# Patient Record
Sex: MALE | Race: WHITE | Employment: OTHER | URBAN - METROPOLITAN AREA
[De-identification: names, ages, dates, MRNs, and addresses within clinical notes are randomized per-mention and may not be internally consistent; named-entity substitution may affect disease eponyms.]

---

## 2017-01-02 DIAGNOSIS — D50.8 OTHER IRON DEFICIENCY ANEMIA: ICD-10-CM

## 2017-01-02 RX ORDER — FERROUS SULFATE 325(65) MG
TABLET ORAL
Qty: 30 TABLET | Refills: 3 | Status: SHIPPED | OUTPATIENT
Start: 2017-01-02

## 2017-01-10 DIAGNOSIS — F90.1 ATTENTION-DEFICIT HYPERACTIVITY DISORDER, PREDOMINANTLY HYPERACTIVE TYPE: ICD-10-CM

## 2017-01-10 DIAGNOSIS — G58.9 MONONEUROPATHY: ICD-10-CM

## 2017-01-11 ENCOUNTER — CARE COORDINATION (OUTPATIENT)
Dept: FAMILY MEDICINE CLINIC | Age: 47
End: 2017-01-11

## 2017-01-11 RX ORDER — LAMOTRIGINE 150 MG/1
TABLET ORAL
Qty: 60 TABLET | Refills: 5 | Status: SHIPPED | OUTPATIENT
Start: 2017-01-11 | End: 2017-08-15 | Stop reason: SDUPTHER

## 2017-01-11 RX ORDER — ATOMOXETINE HCL 60 MG
CAPSULE ORAL
Qty: 30 CAPSULE | Refills: 3 | Status: SHIPPED | OUTPATIENT
Start: 2017-01-11 | End: 2017-05-27

## 2017-01-13 ENCOUNTER — CARE COORDINATION (OUTPATIENT)
Dept: FAMILY MEDICINE CLINIC | Age: 47
End: 2017-01-13

## 2017-01-15 DIAGNOSIS — G47.01 INSOMNIA DUE TO MEDICAL CONDITION: ICD-10-CM

## 2017-01-15 DIAGNOSIS — K27.9 PUD (PEPTIC ULCER DISEASE): ICD-10-CM

## 2017-01-16 RX ORDER — DULOXETIN HYDROCHLORIDE 30 MG/1
CAPSULE, DELAYED RELEASE ORAL
Qty: 30 CAPSULE | Refills: 5 | Status: SHIPPED | OUTPATIENT
Start: 2017-01-16 | End: 2017-01-19 | Stop reason: ALTCHOICE

## 2017-01-16 RX ORDER — TRAZODONE HYDROCHLORIDE 50 MG/1
TABLET ORAL
Qty: 30 TABLET | Refills: 5 | Status: SHIPPED | OUTPATIENT
Start: 2017-01-16 | End: 2017-03-30 | Stop reason: SDUPTHER

## 2017-01-16 RX ORDER — PANTOPRAZOLE SODIUM 40 MG/1
TABLET, DELAYED RELEASE ORAL
Qty: 30 TABLET | Refills: 5 | Status: SHIPPED | OUTPATIENT
Start: 2017-01-16 | End: 2017-11-06 | Stop reason: SDUPTHER

## 2017-01-19 ENCOUNTER — CARE COORDINATION (OUTPATIENT)
Dept: FAMILY MEDICINE CLINIC | Age: 47
End: 2017-01-19

## 2017-01-19 ENCOUNTER — TELEPHONE (OUTPATIENT)
Dept: FAMILY MEDICINE CLINIC | Age: 47
End: 2017-01-19

## 2017-01-19 DIAGNOSIS — Z79.01 ANTICOAGULANT LONG-TERM USE: ICD-10-CM

## 2017-01-19 PROBLEM — J44.9 CHRONIC OBSTRUCTIVE PULMONARY DISEASE (HCC): Status: ACTIVE | Noted: 2017-01-19

## 2017-01-19 RX ORDER — VARENICLINE TARTRATE 25 MG
KIT ORAL
Qty: 53 TABLET | Refills: 1 | Status: SHIPPED | OUTPATIENT
Start: 2017-01-19 | End: 2017-05-27 | Stop reason: ALTCHOICE

## 2017-02-02 RX ORDER — HYDROXYZINE PAMOATE 25 MG/1
CAPSULE ORAL
Qty: 60 CAPSULE | Refills: 2 | Status: SHIPPED | OUTPATIENT
Start: 2017-02-02 | End: 2019-01-11 | Stop reason: SDUPTHER

## 2017-02-04 ENCOUNTER — OFFICE VISIT (OUTPATIENT)
Dept: FAMILY MEDICINE CLINIC | Age: 47
End: 2017-02-04

## 2017-02-04 VITALS
HEIGHT: 66 IN | SYSTOLIC BLOOD PRESSURE: 122 MMHG | DIASTOLIC BLOOD PRESSURE: 84 MMHG | HEART RATE: 78 BPM | WEIGHT: 172 LBS | BODY MASS INDEX: 27.64 KG/M2 | TEMPERATURE: 98.5 F | RESPIRATION RATE: 16 BRPM

## 2017-02-04 DIAGNOSIS — J40 SINOBRONCHITIS: Primary | ICD-10-CM

## 2017-02-04 DIAGNOSIS — I63.411 CEREBROVASCULAR ACCIDENT (CVA) DUE TO EMBOLISM OF RIGHT MIDDLE CEREBRAL ARTERY (HCC): ICD-10-CM

## 2017-02-04 DIAGNOSIS — Z23 NEED FOR PNEUMOCOCCAL VACCINATION: ICD-10-CM

## 2017-02-04 DIAGNOSIS — I48.0 PAROXYSMAL ATRIAL FIBRILLATION (HCC): ICD-10-CM

## 2017-02-04 DIAGNOSIS — J44.9 COPD, MILD (HCC): ICD-10-CM

## 2017-02-04 DIAGNOSIS — J32.9 SINOBRONCHITIS: Primary | ICD-10-CM

## 2017-02-04 PROCEDURE — G8926 SPIRO NO PERF OR DOC: HCPCS | Performed by: FAMILY MEDICINE

## 2017-02-04 PROCEDURE — G8484 FLU IMMUNIZE NO ADMIN: HCPCS | Performed by: FAMILY MEDICINE

## 2017-02-04 PROCEDURE — G8598 ASA/ANTIPLAT THER USED: HCPCS | Performed by: FAMILY MEDICINE

## 2017-02-04 PROCEDURE — 90732 PPSV23 VACC 2 YRS+ SUBQ/IM: CPT | Performed by: FAMILY MEDICINE

## 2017-02-04 PROCEDURE — G8419 CALC BMI OUT NRM PARAM NOF/U: HCPCS | Performed by: FAMILY MEDICINE

## 2017-02-04 PROCEDURE — G0009 ADMIN PNEUMOCOCCAL VACCINE: HCPCS | Performed by: FAMILY MEDICINE

## 2017-02-04 PROCEDURE — G8427 DOCREV CUR MEDS BY ELIG CLIN: HCPCS | Performed by: FAMILY MEDICINE

## 2017-02-04 PROCEDURE — 1036F TOBACCO NON-USER: CPT | Performed by: FAMILY MEDICINE

## 2017-02-04 PROCEDURE — 3023F SPIROM DOC REV: CPT | Performed by: FAMILY MEDICINE

## 2017-02-04 PROCEDURE — 99213 OFFICE O/P EST LOW 20 MIN: CPT | Performed by: FAMILY MEDICINE

## 2017-02-04 RX ORDER — AMOXICILLIN 500 MG/1
CAPSULE ORAL
Qty: 40 CAPSULE | Refills: 0 | Status: SHIPPED | OUTPATIENT
Start: 2017-02-04 | End: 2017-05-27 | Stop reason: ALTCHOICE

## 2017-02-04 RX ORDER — ALBUTEROL SULFATE 90 UG/1
AEROSOL, METERED RESPIRATORY (INHALATION)
Qty: 9 G | Refills: 10 | Status: SHIPPED | OUTPATIENT
Start: 2017-02-04

## 2017-02-07 ENCOUNTER — ANTI-COAG VISIT (OUTPATIENT)
Dept: CARDIOLOGY | Age: 47
End: 2017-02-07

## 2017-02-12 ASSESSMENT — ENCOUNTER SYMPTOMS
COUGH: 1
SORE THROAT: 1
ABDOMINAL PAIN: 0
RHINORRHEA: 1
SHORTNESS OF BREATH: 0
BLOOD IN STOOL: 0
SINUS PRESSURE: 1
WHEEZING: 0

## 2017-02-20 ENCOUNTER — CARE COORDINATION (OUTPATIENT)
Dept: FAMILY MEDICINE CLINIC | Age: 47
End: 2017-02-20

## 2017-02-20 LAB
INR BLD: 2.54
PROTIME: 26.4 SECONDS

## 2017-02-21 ENCOUNTER — ANTI-COAG VISIT (OUTPATIENT)
Dept: FAMILY MEDICINE CLINIC | Age: 47
End: 2017-02-21

## 2017-02-21 DIAGNOSIS — D69.9 ANTICOAGULANT DISORDER (HCC): Primary | ICD-10-CM

## 2017-02-27 DIAGNOSIS — N40.1 BPH ASSOCIATED WITH NOCTURIA: ICD-10-CM

## 2017-02-27 DIAGNOSIS — R35.1 BPH ASSOCIATED WITH NOCTURIA: ICD-10-CM

## 2017-02-27 DIAGNOSIS — N41.0 ACUTE PROSTATITIS: ICD-10-CM

## 2017-02-27 RX ORDER — TAMSULOSIN HYDROCHLORIDE 0.4 MG/1
CAPSULE ORAL
Qty: 180 CAPSULE | Refills: 3 | Status: SHIPPED | OUTPATIENT
Start: 2017-02-27 | End: 2018-03-02 | Stop reason: SDUPTHER

## 2017-03-30 DIAGNOSIS — G47.01 INSOMNIA DUE TO MEDICAL CONDITION: ICD-10-CM

## 2017-03-30 RX ORDER — TRAZODONE HYDROCHLORIDE 50 MG/1
TABLET ORAL
Qty: 30 TABLET | Refills: 5 | Status: SHIPPED | OUTPATIENT
Start: 2017-03-30 | End: 2018-02-02 | Stop reason: SDUPTHER

## 2017-03-31 DIAGNOSIS — G47.01 INSOMNIA DUE TO MEDICAL CONDITION: ICD-10-CM

## 2017-03-31 RX ORDER — TRAZODONE HYDROCHLORIDE 100 MG/1
TABLET ORAL
Qty: 30 TABLET | Refills: 3 | Status: SHIPPED | OUTPATIENT
Start: 2017-03-31 | End: 2017-10-02 | Stop reason: SDUPTHER

## 2017-05-12 ENCOUNTER — CARE COORDINATION (OUTPATIENT)
Dept: CARE COORDINATION | Age: 47
End: 2017-05-12

## 2017-05-12 DIAGNOSIS — D69.9 ANTICOAGULANT DISORDER (HCC): Primary | ICD-10-CM

## 2017-05-12 RX ORDER — WARFARIN SODIUM 5 MG/1
TABLET ORAL
Qty: 60 TABLET | Refills: 5 | Status: SHIPPED | OUTPATIENT
Start: 2017-05-12 | End: 2018-01-05 | Stop reason: SDUPTHER

## 2017-05-12 RX ORDER — WARFARIN SODIUM 5 MG/1
TABLET ORAL
Qty: 30 TABLET | Refills: 0 | Status: SHIPPED | OUTPATIENT
Start: 2017-05-12 | End: 2017-05-12 | Stop reason: SDUPTHER

## 2017-05-27 ENCOUNTER — OFFICE VISIT (OUTPATIENT)
Dept: FAMILY MEDICINE CLINIC | Age: 47
End: 2017-05-27

## 2017-05-27 VITALS
TEMPERATURE: 97.2 F | HEIGHT: 66 IN | BODY MASS INDEX: 29.09 KG/M2 | SYSTOLIC BLOOD PRESSURE: 118 MMHG | RESPIRATION RATE: 16 BRPM | WEIGHT: 181 LBS | DIASTOLIC BLOOD PRESSURE: 76 MMHG | HEART RATE: 78 BPM

## 2017-05-27 DIAGNOSIS — I48.92 ATRIAL FLUTTER, UNSPECIFIED TYPE (HCC): ICD-10-CM

## 2017-05-27 DIAGNOSIS — I69.359 HEMIPLEGIA, DOMINANT SIDE S/P CVA (CEREBROVASCULAR ACCIDENT) (HCC): ICD-10-CM

## 2017-05-27 DIAGNOSIS — D69.9 ANTICOAGULANT DISORDER (HCC): ICD-10-CM

## 2017-05-27 DIAGNOSIS — E29.1 HYPOGONADISM IN MALE: ICD-10-CM

## 2017-05-27 DIAGNOSIS — Z12.5 PROSTATE CANCER SCREENING: ICD-10-CM

## 2017-05-27 DIAGNOSIS — F43.23 ADJUSTMENT REACTION WITH ANXIETY AND DEPRESSION: Primary | ICD-10-CM

## 2017-05-27 LAB
ALBUMIN SERPL-MCNC: 4.6 G/DL (ref 3.9–4.9)
ALP BLD-CCNC: 53 U/L (ref 35–104)
ALT SERPL-CCNC: 20 U/L (ref 0–41)
ANION GAP SERPL CALCULATED.3IONS-SCNC: 15 MEQ/L (ref 7–13)
AST SERPL-CCNC: 23 U/L (ref 0–40)
BASOPHILS ABSOLUTE: 0 K/UL (ref 0–0.2)
BASOPHILS RELATIVE PERCENT: 0.6 %
BILIRUB SERPL-MCNC: 0.6 MG/DL (ref 0–1.2)
BUN BLDV-MCNC: 17 MG/DL (ref 6–20)
CALCIUM SERPL-MCNC: 9.2 MG/DL (ref 8.6–10.2)
CHLORIDE BLD-SCNC: 100 MEQ/L (ref 98–107)
CO2: 25 MEQ/L (ref 22–29)
CREAT SERPL-MCNC: 1.02 MG/DL (ref 0.7–1.2)
EOSINOPHILS ABSOLUTE: 0.7 K/UL (ref 0–0.7)
EOSINOPHILS RELATIVE PERCENT: 8.4 %
GFR AFRICAN AMERICAN: >60
GFR NON-AFRICAN AMERICAN: >60
GLOBULIN: 2.7 G/DL (ref 2.3–3.5)
GLUCOSE BLD-MCNC: 109 MG/DL (ref 74–109)
HCT VFR BLD CALC: 47.4 % (ref 42–52)
HEMOGLOBIN: 16 G/DL (ref 14–18)
INR BLD: 1.7
LYMPHOCYTES ABSOLUTE: 1.5 K/UL (ref 1–4.8)
LYMPHOCYTES RELATIVE PERCENT: 17.6 %
MCH RBC QN AUTO: 29.9 PG (ref 27–31.3)
MCHC RBC AUTO-ENTMCNC: 33.8 % (ref 33–37)
MCV RBC AUTO: 88.3 FL (ref 80–100)
MONOCYTES ABSOLUTE: 0.7 K/UL (ref 0.2–0.8)
MONOCYTES RELATIVE PERCENT: 8.3 %
NEUTROPHILS ABSOLUTE: 5.5 K/UL (ref 1.4–6.5)
NEUTROPHILS RELATIVE PERCENT: 65.1 %
PDW BLD-RTO: 13.8 % (ref 11.5–14.5)
PLATELET # BLD: 175 K/UL (ref 130–400)
POTASSIUM SERPL-SCNC: 4.1 MEQ/L (ref 3.5–5.1)
PROSTATE SPECIFIC ANTIGEN: 1.6 NG/ML
PROTHROMBIN TIME: 18.3 SEC (ref 8.1–13.7)
RBC # BLD: 5.37 M/UL (ref 4.7–6.1)
SLIDE REVIEW: NORMAL
SODIUM BLD-SCNC: 140 MEQ/L (ref 132–144)
TOTAL PROTEIN: 7.3 G/DL (ref 6.4–8.1)
WBC # BLD: 8.4 K/UL (ref 4.8–10.8)

## 2017-05-27 PROCEDURE — G8419 CALC BMI OUT NRM PARAM NOF/U: HCPCS | Performed by: FAMILY MEDICINE

## 2017-05-27 PROCEDURE — 99213 OFFICE O/P EST LOW 20 MIN: CPT | Performed by: FAMILY MEDICINE

## 2017-05-27 PROCEDURE — 1036F TOBACCO NON-USER: CPT | Performed by: FAMILY MEDICINE

## 2017-05-27 PROCEDURE — G8598 ASA/ANTIPLAT THER USED: HCPCS | Performed by: FAMILY MEDICINE

## 2017-05-27 PROCEDURE — G8427 DOCREV CUR MEDS BY ELIG CLIN: HCPCS | Performed by: FAMILY MEDICINE

## 2017-05-27 RX ORDER — DULOXETIN HYDROCHLORIDE 30 MG/1
30 CAPSULE, DELAYED RELEASE ORAL DAILY
Qty: 30 CAPSULE | Refills: 3 | Status: SHIPPED | OUTPATIENT
Start: 2017-05-27

## 2017-05-27 RX ORDER — TESTOSTERONE 16.2 MG/G
GEL TRANSDERMAL
Qty: 75 G | Refills: 3 | Status: SHIPPED | OUTPATIENT
Start: 2017-05-27

## 2017-05-28 RX ORDER — SILDENAFIL 100 MG/1
100 TABLET, FILM COATED ORAL PRN
Qty: 4 TABLET | Refills: 0 | COMMUNITY
Start: 2017-05-28

## 2017-05-30 ENCOUNTER — ANTI-COAG VISIT (OUTPATIENT)
Dept: FAMILY MEDICINE CLINIC | Age: 47
End: 2017-05-30

## 2017-05-31 ENCOUNTER — TELEPHONE (OUTPATIENT)
Dept: FAMILY MEDICINE CLINIC | Age: 47
End: 2017-05-31

## 2017-05-31 ENCOUNTER — ANTI-COAG VISIT (OUTPATIENT)
Dept: FAMILY MEDICINE CLINIC | Age: 47
End: 2017-05-31

## 2017-05-31 ASSESSMENT — ENCOUNTER SYMPTOMS
ABDOMINAL PAIN: 0
CHEST TIGHTNESS: 0
SHORTNESS OF BREATH: 0

## 2017-07-16 RX ORDER — DULOXETIN HYDROCHLORIDE 30 MG/1
CAPSULE, DELAYED RELEASE ORAL
Qty: 30 CAPSULE | OUTPATIENT
Start: 2017-07-16

## 2017-08-15 DIAGNOSIS — G58.9 MONONEUROPATHY: ICD-10-CM

## 2017-08-15 RX ORDER — LAMOTRIGINE 150 MG/1
TABLET ORAL
Qty: 60 TABLET | Refills: 5 | Status: SHIPPED | OUTPATIENT
Start: 2017-08-15 | End: 2018-03-02 | Stop reason: SDUPTHER

## 2017-08-19 RX ORDER — FERROUS SULFATE 325(65) MG
TABLET ORAL
Qty: 30 TABLET | Refills: 5 | Status: SHIPPED | OUTPATIENT
Start: 2017-08-19

## 2017-10-02 DIAGNOSIS — G47.01 INSOMNIA DUE TO MEDICAL CONDITION: ICD-10-CM

## 2017-10-02 RX ORDER — TRAZODONE HYDROCHLORIDE 100 MG/1
TABLET ORAL
Qty: 30 TABLET | Refills: 3 | Status: SHIPPED | OUTPATIENT
Start: 2017-10-02 | End: 2018-03-02 | Stop reason: SDUPTHER

## 2017-11-06 DIAGNOSIS — K27.9 PUD (PEPTIC ULCER DISEASE): ICD-10-CM

## 2017-11-06 RX ORDER — PANTOPRAZOLE SODIUM 40 MG/1
TABLET, DELAYED RELEASE ORAL
Qty: 30 TABLET | Refills: 5 | Status: SHIPPED | OUTPATIENT
Start: 2017-11-06

## 2018-01-05 DIAGNOSIS — D69.9 ANTICOAGULANT DISORDER (HCC): Primary | ICD-10-CM

## 2018-01-05 NOTE — TELEPHONE ENCOUNTER
Pharmacy requests refill on medication. Please approve or deny this request.  Last seen by you on 5/27/2017. No future appointments.

## 2018-01-06 PROBLEM — I81 PVT (PORTAL VEIN THROMBOSIS): Status: ACTIVE | Noted: 2018-01-06

## 2018-01-06 RX ORDER — WARFARIN SODIUM 5 MG/1
TABLET ORAL
Qty: 30 TABLET | Refills: 5 | Status: SHIPPED | OUTPATIENT
Start: 2018-01-06 | End: 2018-04-27 | Stop reason: SDUPTHER

## 2018-02-01 ENCOUNTER — OFFICE VISIT (OUTPATIENT)
Dept: FAMILY MEDICINE CLINIC | Age: 48
End: 2018-02-01
Payer: MEDICARE

## 2018-02-01 VITALS
HEIGHT: 66 IN | DIASTOLIC BLOOD PRESSURE: 88 MMHG | BODY MASS INDEX: 29.09 KG/M2 | WEIGHT: 181 LBS | TEMPERATURE: 99.1 F | RESPIRATION RATE: 14 BRPM | HEART RATE: 66 BPM | SYSTOLIC BLOOD PRESSURE: 118 MMHG

## 2018-02-01 DIAGNOSIS — Z12.11 SCREENING FOR MALIGNANT NEOPLASM OF COLON: ICD-10-CM

## 2018-02-01 DIAGNOSIS — Z86.79 HISTORY OF SICK SINUS SYNDROME: ICD-10-CM

## 2018-02-01 DIAGNOSIS — N52.01 ERECTILE DYSFUNCTION DUE TO ARTERIAL INSUFFICIENCY: ICD-10-CM

## 2018-02-01 DIAGNOSIS — F31.73 BIPOLAR DISORDER, IN PARTIAL REMISSION, MOST RECENT EPISODE MANIC (HCC): Primary | ICD-10-CM

## 2018-02-01 DIAGNOSIS — F33.1 MODERATE EPISODE OF RECURRENT MAJOR DEPRESSIVE DISORDER (HCC): ICD-10-CM

## 2018-02-01 PROCEDURE — G8427 DOCREV CUR MEDS BY ELIG CLIN: HCPCS | Performed by: FAMILY MEDICINE

## 2018-02-01 PROCEDURE — 99213 OFFICE O/P EST LOW 20 MIN: CPT | Performed by: FAMILY MEDICINE

## 2018-02-01 PROCEDURE — G8598 ASA/ANTIPLAT THER USED: HCPCS | Performed by: FAMILY MEDICINE

## 2018-02-01 PROCEDURE — G8484 FLU IMMUNIZE NO ADMIN: HCPCS | Performed by: FAMILY MEDICINE

## 2018-02-01 PROCEDURE — G8419 CALC BMI OUT NRM PARAM NOF/U: HCPCS | Performed by: FAMILY MEDICINE

## 2018-02-01 PROCEDURE — 1036F TOBACCO NON-USER: CPT | Performed by: FAMILY MEDICINE

## 2018-02-01 RX ORDER — SILDENAFIL 50 MG/1
50 TABLET, FILM COATED ORAL PRN
Qty: 4 TABLET | Refills: 0 | COMMUNITY
Start: 2018-02-01

## 2018-02-01 RX ORDER — LISINOPRIL 5 MG/1
TABLET ORAL
Qty: 30 TABLET | Refills: 5 | Status: SHIPPED | OUTPATIENT
Start: 2018-02-01 | End: 2018-09-08 | Stop reason: SDUPTHER

## 2018-02-01 ASSESSMENT — PATIENT HEALTH QUESTIONNAIRE - PHQ9
SUM OF ALL RESPONSES TO PHQ9 QUESTIONS 1 & 2: 0
2. FEELING DOWN, DEPRESSED OR HOPELESS: 0
1. LITTLE INTEREST OR PLEASURE IN DOING THINGS: 0
SUM OF ALL RESPONSES TO PHQ QUESTIONS 1-9: 0

## 2018-02-02 DIAGNOSIS — G47.01 INSOMNIA DUE TO MEDICAL CONDITION: ICD-10-CM

## 2018-02-02 RX ORDER — TRAZODONE HYDROCHLORIDE 50 MG/1
TABLET ORAL
Qty: 30 TABLET | Refills: 2 | Status: SHIPPED | OUTPATIENT
Start: 2018-02-02 | End: 2019-01-25 | Stop reason: SDUPTHER

## 2018-02-08 PROBLEM — F33.1 MODERATE EPISODE OF RECURRENT MAJOR DEPRESSIVE DISORDER (HCC): Status: ACTIVE | Noted: 2018-02-08

## 2018-02-08 ASSESSMENT — ENCOUNTER SYMPTOMS
SHORTNESS OF BREATH: 0
COUGH: 0
NAUSEA: 0
ABDOMINAL PAIN: 0

## 2018-02-19 DIAGNOSIS — Z12.11 SCREENING FOR MALIGNANT NEOPLASM OF COLON: ICD-10-CM

## 2018-03-02 DIAGNOSIS — G47.01 INSOMNIA DUE TO MEDICAL CONDITION: ICD-10-CM

## 2018-03-02 DIAGNOSIS — N41.0 ACUTE PROSTATITIS: ICD-10-CM

## 2018-03-02 DIAGNOSIS — G58.9 MONONEUROPATHY: ICD-10-CM

## 2018-03-02 DIAGNOSIS — N40.1 BPH ASSOCIATED WITH NOCTURIA: ICD-10-CM

## 2018-03-02 DIAGNOSIS — R35.1 BPH ASSOCIATED WITH NOCTURIA: ICD-10-CM

## 2018-03-02 RX ORDER — TRAZODONE HYDROCHLORIDE 100 MG/1
TABLET ORAL
Qty: 30 TABLET | Refills: 3 | Status: SHIPPED | OUTPATIENT
Start: 2018-03-02 | End: 2018-07-21 | Stop reason: SDUPTHER

## 2018-03-02 RX ORDER — LAMOTRIGINE 150 MG/1
TABLET ORAL
Qty: 60 TABLET | Refills: 5 | Status: SHIPPED | OUTPATIENT
Start: 2018-03-02 | End: 2018-10-01 | Stop reason: SDUPTHER

## 2018-03-02 RX ORDER — TAMSULOSIN HYDROCHLORIDE 0.4 MG/1
CAPSULE ORAL
Qty: 180 CAPSULE | Refills: 3 | Status: SHIPPED | OUTPATIENT
Start: 2018-03-02

## 2018-03-02 NOTE — TELEPHONE ENCOUNTER
PHARMACY REQUESTING REFILL  PATIENT LAST SEEN 2/1/18  LAST REFILL 2/27/17  PLEASE APPROVE OR DENY.        Future Appointments  Date Time Provider Dee Keller   5/5/2018 9:30 AM [facility] [place], [place], THE [facility] AT [place]

## 2018-04-25 DIAGNOSIS — D69.9 ANTICOAGULANT DISORDER (HCC): ICD-10-CM

## 2018-04-25 RX ORDER — WARFARIN SODIUM 5 MG/1
TABLET ORAL
Qty: 30 TABLET | OUTPATIENT
Start: 2018-04-25

## 2018-05-09 LAB — INR BLD: 2.3

## 2018-05-14 ENCOUNTER — TELEPHONE (OUTPATIENT)
Dept: FAMILY MEDICINE CLINIC | Age: 48
End: 2018-05-14

## 2018-05-14 DIAGNOSIS — Z83.3 FAMILY HISTORY OF DIABETES MELLITUS: ICD-10-CM

## 2018-05-14 DIAGNOSIS — H53.9 VISUAL CHANGES: Primary | ICD-10-CM

## 2018-05-15 ENCOUNTER — ANTI-COAG VISIT (OUTPATIENT)
Dept: FAMILY MEDICINE CLINIC | Age: 48
End: 2018-05-15

## 2018-05-17 DIAGNOSIS — Z83.3 FAMILY HISTORY OF DIABETES MELLITUS: ICD-10-CM

## 2018-05-17 DIAGNOSIS — H53.9 VISUAL CHANGES: ICD-10-CM

## 2018-05-17 LAB
GLUCOSE FASTING: 130 MG/DL (ref 74–109)
HBA1C MFR BLD: 5.9 % (ref 4.8–5.9)

## 2018-06-06 ENCOUNTER — ANTI-COAG VISIT (OUTPATIENT)
Dept: FAMILY MEDICINE CLINIC | Age: 48
End: 2018-06-06

## 2018-06-06 LAB — INR BLD: 1.4

## 2018-07-10 ENCOUNTER — ANTI-COAG VISIT (OUTPATIENT)
Dept: FAMILY MEDICINE CLINIC | Age: 48
End: 2018-07-10

## 2018-07-10 DIAGNOSIS — D69.9 ANTICOAGULANT DISORDER (HCC): ICD-10-CM

## 2018-07-10 DIAGNOSIS — I63.112 CEREBROVASCULAR ACCIDENT (CVA) DUE TO EMBOLISM OF LEFT VERTEBRAL ARTERY (HCC): ICD-10-CM

## 2018-07-10 LAB
INR BLD: 2.9
PROTHROMBIN TIME: 30.8 SEC (ref 9.6–12.3)

## 2018-07-21 DIAGNOSIS — G47.01 INSOMNIA DUE TO MEDICAL CONDITION: ICD-10-CM

## 2018-07-23 RX ORDER — TRAZODONE HYDROCHLORIDE 100 MG/1
TABLET ORAL
Qty: 30 TABLET | Refills: 0 | Status: SHIPPED | OUTPATIENT
Start: 2018-07-23 | End: 2018-09-10 | Stop reason: SDUPTHER

## 2018-07-23 NOTE — TELEPHONE ENCOUNTER
Pharmacy requesting refill please approve or deny. Last seen 2/1/2018  Last filled 03/02/2018    No future appointments.

## 2018-07-26 ENCOUNTER — TELEPHONE (OUTPATIENT)
Dept: FAMILY MEDICINE CLINIC | Age: 48
End: 2018-07-26

## 2018-07-26 DIAGNOSIS — F31.73 BIPOLAR DISORDER, IN PARTIAL REMISSION, MOST RECENT EPISODE MANIC (HCC): ICD-10-CM

## 2018-08-23 DIAGNOSIS — D69.9 ANTICOAGULANT DISORDER (HCC): ICD-10-CM

## 2018-08-23 RX ORDER — WARFARIN SODIUM 5 MG/1
TABLET ORAL
Qty: 30 TABLET | Refills: 1 | Status: SHIPPED | OUTPATIENT
Start: 2018-08-23 | End: 2018-10-13 | Stop reason: SDUPTHER

## 2018-08-23 NOTE — TELEPHONE ENCOUNTER
pharmacy requesting refill. Please approve or deny this refill request. The order is pended. Thank you. LOV 2/1/18    Next Visit Date:  No future appointments.

## 2018-08-27 ENCOUNTER — ANTI-COAG VISIT (OUTPATIENT)
Dept: FAMILY MEDICINE CLINIC | Age: 48
End: 2018-08-27

## 2018-08-27 DIAGNOSIS — D69.9 ANTICOAGULANT DISORDER (HCC): ICD-10-CM

## 2018-08-27 LAB
INR BLD: 2.7
PROTHROMBIN TIME: 28.4 SEC (ref 9.6–12.3)

## 2018-09-08 DIAGNOSIS — Z86.79 HISTORY OF SICK SINUS SYNDROME: ICD-10-CM

## 2018-09-08 RX ORDER — LISINOPRIL 5 MG/1
TABLET ORAL
Qty: 30 TABLET | Refills: 3 | Status: SHIPPED | OUTPATIENT
Start: 2018-09-08 | End: 2019-03-19 | Stop reason: SDUPTHER

## 2018-09-10 DIAGNOSIS — G47.01 INSOMNIA DUE TO MEDICAL CONDITION: ICD-10-CM

## 2018-09-10 RX ORDER — TRAZODONE HYDROCHLORIDE 100 MG/1
TABLET ORAL
Qty: 90 TABLET | Refills: 1 | Status: SHIPPED | OUTPATIENT
Start: 2018-09-10 | End: 2019-01-25 | Stop reason: SDUPTHER

## 2018-10-01 DIAGNOSIS — G58.9 MONONEUROPATHY: ICD-10-CM

## 2018-10-01 NOTE — TELEPHONE ENCOUNTER
Call pt, ??? DDM/melinda, AS last 3 rxs sent to [address]??? Let me know where? ?

## 2018-10-02 RX ORDER — LAMOTRIGINE 150 MG/1
TABLET ORAL
Qty: 60 TABLET | Refills: 1 | Status: SHIPPED | OUTPATIENT
Start: 2018-10-02 | End: 2018-12-18 | Stop reason: SDUPTHER

## 2018-12-17 ENCOUNTER — TELEPHONE (OUTPATIENT)
Dept: FAMILY MEDICINE CLINIC | Age: 48
End: 2018-12-17

## 2018-12-17 DIAGNOSIS — G58.9 MONONEUROPATHY: ICD-10-CM

## 2018-12-17 NOTE — TELEPHONE ENCOUNTER
PATIENT REQUESTING A REFILL. PATIENT LAST SEEN 2/1/18    PLEASE APPROVE OR DENY. No future appointments.

## 2018-12-18 RX ORDER — LAMOTRIGINE 150 MG/1
TABLET ORAL
Qty: 60 TABLET | Refills: 2 | Status: SHIPPED | OUTPATIENT
Start: 2018-12-18 | End: 2019-03-27 | Stop reason: SDUPTHER

## 2018-12-28 DIAGNOSIS — D69.9 ANTICOAGULANT DISORDER (HCC): ICD-10-CM

## 2018-12-28 RX ORDER — WARFARIN SODIUM 5 MG/1
TABLET ORAL
Qty: 30 TABLET | Refills: 5 | Status: SHIPPED | OUTPATIENT
Start: 2018-12-28

## 2019-01-11 DIAGNOSIS — F31.73 BIPOLAR DISORDER, IN PARTIAL REMISSION, MOST RECENT EPISODE MANIC (HCC): ICD-10-CM

## 2019-01-11 RX ORDER — HYDROXYZINE PAMOATE 25 MG/1
CAPSULE ORAL
Qty: 60 CAPSULE | Refills: 1 | Status: SHIPPED | OUTPATIENT
Start: 2019-01-11

## 2019-01-25 DIAGNOSIS — G47.01 INSOMNIA DUE TO MEDICAL CONDITION: ICD-10-CM

## 2019-01-25 RX ORDER — TRAZODONE HYDROCHLORIDE 50 MG/1
TABLET ORAL
Qty: 30 TABLET | Refills: 1 | Status: SHIPPED | OUTPATIENT
Start: 2019-01-25 | End: 2019-03-24 | Stop reason: SDUPTHER

## 2019-01-25 RX ORDER — TRAZODONE HYDROCHLORIDE 100 MG/1
TABLET ORAL
Qty: 30 TABLET | Refills: 1 | Status: SHIPPED | OUTPATIENT
Start: 2019-01-25

## 2019-02-27 ENCOUNTER — TELEPHONE (OUTPATIENT)
Dept: FAMILY MEDICINE CLINIC | Age: 49
End: 2019-02-27

## 2019-03-27 DIAGNOSIS — G58.9 MONONEUROPATHY: ICD-10-CM

## 2019-03-28 RX ORDER — LAMOTRIGINE 150 MG/1
TABLET ORAL
Qty: 60 TABLET | Refills: 1 | Status: SHIPPED | OUTPATIENT
Start: 2019-03-28

## 2023-02-08 PROBLEM — M79.672 FOOT PAIN, BILATERAL: Status: RESOLVED | Noted: 2023-02-08 | Resolved: 2023-02-08

## 2023-02-08 PROBLEM — I50.9 CONGENITAL HEART FAILURE (MULTI): Status: ACTIVE | Noted: 2023-02-08

## 2023-02-08 PROBLEM — M79.671 FOOT PAIN, BILATERAL: Status: RESOLVED | Noted: 2023-02-08 | Resolved: 2023-02-08

## 2023-02-08 PROBLEM — J20.9 ACUTE BRONCHITIS: Status: RESOLVED | Noted: 2023-02-08 | Resolved: 2023-02-08

## 2023-02-08 PROBLEM — L74.513 HYPERHIDROSIS OF SOLES: Status: ACTIVE | Noted: 2023-02-08

## 2023-02-08 PROBLEM — K21.9 GERD (GASTROESOPHAGEAL REFLUX DISEASE): Status: ACTIVE | Noted: 2023-02-08

## 2023-02-08 PROBLEM — I51.9 LEFT VENTRICULAR SYSTOLIC DYSFUNCTION: Status: ACTIVE | Noted: 2023-02-08

## 2023-02-08 PROBLEM — F17.200 CURRENT EVERY DAY SMOKER: Status: ACTIVE | Noted: 2023-02-08

## 2023-02-08 PROBLEM — F41.8 DEPRESSION WITH ANXIETY: Status: ACTIVE | Noted: 2023-02-08

## 2023-02-08 PROBLEM — F43.89 STRESS REACTION, CHRONIC: Status: ACTIVE | Noted: 2023-02-08

## 2023-02-08 PROBLEM — K63.5 POLYP OF COLON: Status: ACTIVE | Noted: 2023-02-08

## 2023-02-08 PROBLEM — Q24.6: Status: ACTIVE | Noted: 2023-02-08

## 2023-02-08 PROBLEM — G89.0 CENTRAL PAIN SYNDROME: Status: ACTIVE | Noted: 2023-02-08

## 2023-02-08 PROBLEM — R26.89 GAIT, ANTALGIC: Status: ACTIVE | Noted: 2023-02-08

## 2023-02-08 PROBLEM — G47.30 SLEEP APNEA: Status: ACTIVE | Noted: 2023-02-08

## 2023-02-08 PROBLEM — R79.89 LOW TESTOSTERONE: Status: ACTIVE | Noted: 2023-02-08

## 2023-02-08 PROBLEM — I42.9 CARDIOMYOPATHY (MULTI): Status: ACTIVE | Noted: 2023-02-08

## 2023-02-08 PROBLEM — I63.9 STROKE (MULTI): Status: ACTIVE | Noted: 2023-02-08

## 2023-02-08 PROBLEM — R41.3 COMPLAINT OF MEMORY DISORDER WITHOUT OBSERVED OBJECTIVE MEMORY DEFICIT: Status: ACTIVE | Noted: 2023-02-08

## 2023-02-08 PROBLEM — I49.5 SINUS NODE DYSFUNCTION (MULTI): Status: ACTIVE | Noted: 2023-02-08

## 2023-02-08 PROBLEM — I69.919 COGNITIVE DEFICITS AS LATE EFFECT OF CEREBROVASCULAR DISEASE: Status: ACTIVE | Noted: 2023-02-08

## 2023-02-08 PROBLEM — L85.9 HYPERKERATOSIS: Status: ACTIVE | Noted: 2023-02-08

## 2023-02-08 PROBLEM — M75.20 BICEPS TENDONITIS: Status: ACTIVE | Noted: 2023-02-08

## 2023-02-08 PROBLEM — L85.3 XEROSIS OF SKIN: Status: ACTIVE | Noted: 2023-02-08

## 2023-02-08 PROBLEM — Q25.0 PDA (PATENT DUCTUS ARTERIOSUS) (HHS-HCC): Status: ACTIVE | Noted: 2023-02-08

## 2023-02-08 PROBLEM — J45.909 RAD (REACTIVE AIRWAY DISEASE) (HHS-HCC): Status: ACTIVE | Noted: 2023-02-08

## 2023-02-08 PROBLEM — I48.91 ATRIAL FIBRILLATION (MULTI): Status: ACTIVE | Noted: 2023-02-08

## 2023-02-08 PROBLEM — G47.9 SLEEP DISTURBANCE: Status: ACTIVE | Noted: 2023-02-08

## 2023-02-08 PROBLEM — Q24.9 CHD (CONGENITAL HEART DISEASE) (HHS-HCC): Status: ACTIVE | Noted: 2023-02-08

## 2023-02-08 PROBLEM — R06.09 DYSPNEA ON EXERTION: Status: ACTIVE | Noted: 2023-02-08

## 2023-02-08 PROBLEM — D86.9 SARCOIDOSIS: Status: ACTIVE | Noted: 2023-02-08

## 2023-02-08 PROBLEM — R53.82 CHRONIC FATIGUE: Status: ACTIVE | Noted: 2023-02-08

## 2023-02-08 PROBLEM — M75.101 ROTATOR CUFF SYNDROME OF RIGHT SHOULDER: Status: ACTIVE | Noted: 2023-02-08

## 2023-02-08 PROBLEM — Z95.0 CARDIAC PACEMAKER: Status: ACTIVE | Noted: 2023-02-08

## 2023-02-08 PROBLEM — N52.9 ERECTILE DYSFUNCTION: Status: ACTIVE | Noted: 2023-02-08

## 2023-02-08 PROBLEM — B07.8 VERRUCA PLANA: Status: ACTIVE | Noted: 2023-02-08

## 2023-02-08 PROBLEM — E11.65 TYPE 2 DIABETES MELLITUS WITH HYPERGLYCEMIA, WITHOUT LONG-TERM CURRENT USE OF INSULIN (MULTI): Status: ACTIVE | Noted: 2023-02-08

## 2023-02-08 PROBLEM — B35.1 ONYCHOMYCOSIS: Status: RESOLVED | Noted: 2023-02-08 | Resolved: 2023-02-08

## 2023-02-08 PROBLEM — F32.4 DEPRESSION, MAJOR, IN PARTIAL REMISSION (CMS-HCC): Status: ACTIVE | Noted: 2023-02-08

## 2023-02-08 PROBLEM — J06.9 URI (UPPER RESPIRATORY INFECTION): Status: RESOLVED | Noted: 2023-02-08 | Resolved: 2023-02-08

## 2023-02-08 PROBLEM — I44.2 ATRIOVENTRICULAR BLOCK, COMPLETE (MULTI): Status: ACTIVE | Noted: 2023-02-08

## 2023-02-08 PROBLEM — R27.8 DECREASED COORDINATION: Status: ACTIVE | Noted: 2023-02-08

## 2023-02-08 PROBLEM — R05.9 COUGH: Status: RESOLVED | Noted: 2023-02-08 | Resolved: 2023-02-08

## 2023-02-08 PROBLEM — I10 HTN (HYPERTENSION): Status: ACTIVE | Noted: 2023-02-08

## 2023-02-08 PROBLEM — I51.89 LEFT VENTRICULAR SYSTOLIC DYSFUNCTION: Status: ACTIVE | Noted: 2023-02-08

## 2023-02-08 PROBLEM — G40.109 LOCALIZATION-RELATED FOCAL EPILEPSY WITH SIMPLE PARTIAL SEIZURES (MULTI): Status: ACTIVE | Noted: 2023-02-08

## 2023-02-08 PROBLEM — G62.9 NEUROPATHY: Status: ACTIVE | Noted: 2023-02-08

## 2023-02-08 PROBLEM — G81.94 HEMIPARESIS, LEFT (MULTI): Status: ACTIVE | Noted: 2023-02-08

## 2023-02-08 RX ORDER — LAMOTRIGINE 200 MG/1
1 TABLET ORAL 2 TIMES DAILY
COMMUNITY
Start: 2021-12-29 | End: 2024-02-16 | Stop reason: WASHOUT

## 2023-02-08 RX ORDER — FUROSEMIDE 20 MG/1
1 TABLET ORAL DAILY
COMMUNITY
Start: 2022-01-25 | End: 2023-11-14

## 2023-02-08 RX ORDER — LISINOPRIL 5 MG/1
5 TABLET ORAL DAILY
COMMUNITY
End: 2023-08-25 | Stop reason: SINTOL

## 2023-02-08 RX ORDER — WARFARIN SODIUM 5 MG/1
TABLET ORAL
COMMUNITY
Start: 2021-07-02 | End: 2023-04-25 | Stop reason: ALTCHOICE

## 2023-02-08 RX ORDER — PROPRANOLOL HYDROCHLORIDE 20 MG/1
1 TABLET ORAL 3 TIMES DAILY
COMMUNITY
Start: 2022-05-28 | End: 2024-05-31 | Stop reason: SDUPTHER

## 2023-02-08 RX ORDER — METFORMIN HYDROCHLORIDE 500 MG/1
1 TABLET ORAL DAILY
COMMUNITY
Start: 2021-07-02 | End: 2023-09-19 | Stop reason: SDUPTHER

## 2023-02-08 RX ORDER — NALTREXONE HYDROCHLORIDE 50 MG/1
1 TABLET, FILM COATED ORAL NIGHTLY
COMMUNITY
Start: 2022-01-26 | End: 2024-02-16 | Stop reason: WASHOUT

## 2023-02-08 RX ORDER — TERBINAFINE HYDROCHLORIDE 250 MG/1
1 TABLET ORAL DAILY
COMMUNITY
Start: 2022-07-18 | End: 2024-02-16 | Stop reason: WASHOUT

## 2023-02-08 RX ORDER — GABAPENTIN 400 MG/1
1 CAPSULE ORAL 2 TIMES DAILY
COMMUNITY
Start: 2018-04-27 | End: 2024-02-06 | Stop reason: SDUPTHER

## 2023-02-28 ENCOUNTER — DOCUMENTATION (OUTPATIENT)
Dept: CARE COORDINATION | Facility: CLINIC | Age: 53
End: 2023-02-28
Payer: MEDICARE

## 2023-03-07 DIAGNOSIS — E11.65 TYPE 2 DIABETES MELLITUS WITH HYPERGLYCEMIA, WITHOUT LONG-TERM CURRENT USE OF INSULIN (MULTI): ICD-10-CM

## 2023-03-07 DIAGNOSIS — I50.9 HEART FAILURE, UNSPECIFIED HF CHRONICITY, UNSPECIFIED HEART FAILURE TYPE (MULTI): Primary | ICD-10-CM

## 2023-03-29 ENCOUNTER — PATIENT OUTREACH (OUTPATIENT)
Dept: PRIMARY CARE | Facility: CLINIC | Age: 53
End: 2023-03-29
Payer: MEDICARE

## 2023-03-29 NOTE — PROGRESS NOTES
Unable to reach patient for call back after patient's follow up appointment with PCP. Attempted Left voicemail with call back number for patient if any questions or concerns do arise.

## 2023-04-14 ENCOUNTER — LAB (OUTPATIENT)
Dept: LAB | Facility: LAB | Age: 53
End: 2023-04-14
Payer: MEDICARE

## 2023-04-14 DIAGNOSIS — Z79.01 CURRENT USE OF LONG TERM ANTICOAGULATION: ICD-10-CM

## 2023-04-14 LAB
INR IN PPP BY COAGULATION ASSAY: 1.5 (ref 0.9–1.1)
PROTHROMBIN TIME (PT) IN PPP BY COAGULATION ASSAY: 17.7 SEC (ref 9.8–13.4)

## 2023-04-14 PROCEDURE — 36415 COLL VENOUS BLD VENIPUNCTURE: CPT

## 2023-04-14 PROCEDURE — 85610 PROTHROMBIN TIME: CPT

## 2023-04-25 ENCOUNTER — OFFICE VISIT (OUTPATIENT)
Dept: PRIMARY CARE | Facility: CLINIC | Age: 53
End: 2023-04-25
Payer: MEDICARE

## 2023-04-25 VITALS
TEMPERATURE: 97.8 F | BODY MASS INDEX: 28.99 KG/M2 | HEART RATE: 70 BPM | HEIGHT: 65 IN | WEIGHT: 174 LBS | SYSTOLIC BLOOD PRESSURE: 132 MMHG | OXYGEN SATURATION: 96 % | DIASTOLIC BLOOD PRESSURE: 72 MMHG

## 2023-04-25 DIAGNOSIS — I10 PRIMARY HYPERTENSION: ICD-10-CM

## 2023-04-25 DIAGNOSIS — E11.65 TYPE 2 DIABETES MELLITUS WITH HYPERGLYCEMIA, WITHOUT LONG-TERM CURRENT USE OF INSULIN (MULTI): ICD-10-CM

## 2023-04-25 DIAGNOSIS — F17.200 CURRENT EVERY DAY SMOKER: ICD-10-CM

## 2023-04-25 DIAGNOSIS — I50.9 CONGENITAL HEART FAILURE (MULTI): ICD-10-CM

## 2023-04-25 DIAGNOSIS — F90.0 ATTENTION DEFICIT HYPERACTIVITY DISORDER, INATTENTIVE TYPE: ICD-10-CM

## 2023-04-25 DIAGNOSIS — F33.41 RECURRENT MAJOR DEPRESSIVE DISORDER, IN PARTIAL REMISSION (CMS-HCC): ICD-10-CM

## 2023-04-25 DIAGNOSIS — I69.919 COGNITIVE DEFICITS AS LATE EFFECT OF CEREBROVASCULAR DISEASE: ICD-10-CM

## 2023-04-25 DIAGNOSIS — I48.0 PAROXYSMAL ATRIAL FIBRILLATION (MULTI): ICD-10-CM

## 2023-04-25 DIAGNOSIS — J20.8 ACUTE BRONCHITIS DUE TO OTHER SPECIFIED ORGANISMS: Primary | ICD-10-CM

## 2023-04-25 PROBLEM — E11.9 TYPE 2 DIABETES MELLITUS WITHOUT RETINOPATHY (MULTI): Status: RESOLVED | Noted: 2022-06-29 | Resolved: 2023-04-25

## 2023-04-25 PROBLEM — G47.00 INSOMNIA: Status: ACTIVE | Noted: 2019-05-15

## 2023-04-25 PROBLEM — R73.03 PREDIABETES: Chronic | Status: ACTIVE | Noted: 2020-01-03

## 2023-04-25 PROBLEM — R26.81 UNSTEADY GAIT: Status: ACTIVE | Noted: 2020-01-03

## 2023-04-25 PROBLEM — H55.01 CONGENITAL NYSTAGMUS: Status: ACTIVE | Noted: 2018-05-14

## 2023-04-25 PROBLEM — E11.40 CHRONIC PAINFUL DIABETIC NEUROPATHY (MULTI): Status: ACTIVE | Noted: 2023-04-25

## 2023-04-25 PROBLEM — E11.9 TYPE 2 DIABETES MELLITUS WITHOUT RETINOPATHY (MULTI): Status: ACTIVE | Noted: 2022-06-29

## 2023-04-25 PROBLEM — Z86.79 HISTORY OF SICK SINUS SYNDROME: Status: RESOLVED | Noted: 2019-11-01 | Resolved: 2023-04-25

## 2023-04-25 PROBLEM — H52.223 REGULAR ASTIGMATISM OF BOTH EYES: Status: ACTIVE | Noted: 2022-05-02

## 2023-04-25 PROBLEM — M79.2 NEUROPATHIC PAIN: Chronic | Status: ACTIVE | Noted: 2020-01-03

## 2023-04-25 PROBLEM — F33.2 MAJOR DEPRESSIVE DISORDER, RECURRENT SEVERE WITHOUT PSYCHOTIC FEATURES (MULTI): Status: ACTIVE | Noted: 2019-02-19

## 2023-04-25 PROBLEM — I81 PVT (PORTAL VEIN THROMBOSIS): Status: ACTIVE | Noted: 2018-01-06

## 2023-04-25 PROBLEM — R53.1 DECREASED STRENGTH: Status: ACTIVE | Noted: 2020-01-21

## 2023-04-25 PROBLEM — Z96.1 PSEUDOPHAKIA: Status: ACTIVE | Noted: 2022-10-27

## 2023-04-25 PROBLEM — Z86.73 HISTORY OF STROKE: Status: RESOLVED | Noted: 2020-01-03 | Resolved: 2023-04-25

## 2023-04-25 PROBLEM — H54.7 VISUAL IMPAIRMENT: Status: ACTIVE | Noted: 2018-05-14

## 2023-04-25 PROBLEM — H52.7 REFRACTIVE ERROR: Status: ACTIVE | Noted: 2018-05-14

## 2023-04-25 PROBLEM — Z86.59 HISTORY OF BIPOLAR DISORDER: Status: RESOLVED | Noted: 2019-11-01 | Resolved: 2023-04-25

## 2023-04-25 PROBLEM — R47.89 WORD FINDING DIFFICULTY: Status: ACTIVE | Noted: 2020-01-03

## 2023-04-25 PROBLEM — F41.8 DEPRESSION WITH ANXIETY: Status: RESOLVED | Noted: 2023-02-08 | Resolved: 2023-04-25

## 2023-04-25 PROCEDURE — 3078F DIAST BP <80 MM HG: CPT | Performed by: FAMILY MEDICINE

## 2023-04-25 PROCEDURE — 99214 OFFICE O/P EST MOD 30 MIN: CPT | Performed by: FAMILY MEDICINE

## 2023-04-25 PROCEDURE — 4010F ACE/ARB THERAPY RXD/TAKEN: CPT | Performed by: FAMILY MEDICINE

## 2023-04-25 PROCEDURE — 3044F HG A1C LEVEL LT 7.0%: CPT | Performed by: FAMILY MEDICINE

## 2023-04-25 PROCEDURE — 3075F SYST BP GE 130 - 139MM HG: CPT | Performed by: FAMILY MEDICINE

## 2023-04-25 RX ORDER — CEFDINIR 300 MG/1
300 CAPSULE ORAL 2 TIMES DAILY
Qty: 20 CAPSULE | Refills: 0 | Status: SHIPPED | OUTPATIENT
Start: 2023-04-25 | End: 2023-05-05

## 2023-04-25 RX ORDER — APIXABAN 5 MG/1
1 TABLET, FILM COATED ORAL 2 TIMES DAILY
COMMUNITY
Start: 2023-03-31 | End: 2024-05-28 | Stop reason: SDUPTHER

## 2023-04-25 ASSESSMENT — ENCOUNTER SYMPTOMS
COUGH: 0
BACK PAIN: 0
CONSTIPATION: 0
SORE THROAT: 0
BRUISES/BLEEDS EASILY: 0
WEAKNESS: 0
HEADACHES: 0
DYSPHORIC MOOD: 0
ARTHRALGIAS: 0
ABDOMINAL PAIN: 0
DIARRHEA: 0
DIZZINESS: 0
HEMATURIA: 0
EYE DISCHARGE: 0
MYALGIAS: 0
NERVOUS/ANXIOUS: 0
NECK PAIN: 0
DIFFICULTY URINATING: 0
FATIGUE: 0
NAUSEA: 0
ACTIVITY CHANGE: 0
NUMBNESS: 0
VOMITING: 0
CHEST TIGHTNESS: 0
ADENOPATHY: 0
BLOOD IN STOOL: 0
SHORTNESS OF BREATH: 0

## 2023-04-25 NOTE — PROGRESS NOTES
Subjective   Patient ID: Brandt Hdz is a 52 y.o. male who presents for follow up on Diabetes and Hypertension.    Discuss sinus issues, still congested and coughing.     HPI  Patient complains of head cold sinus and cough  Symptoms of been going on for several months  No stomach flu or vomiting  No fever or rash  No positive COVID testing  Was on a Z-Gerardo but symptoms persist  Discussed further testing or specialist evaluation he would like more medicine    A-fib, CHF stable  Follow with cardiology  No chest pain or shortness of breath reported    Hypertension stable tolerating medicine    Patient is a smoker and should quit discussed risks    Attention deficit disorder stable and cognitive defects from CVA stable keep follow-up with specialist    Diabetes controlled  Tolerates medicine well    Mood stable  No suicidal ideation  No psychotic or manic symptoms  Review of Systems   Constitutional:  Negative for activity change and fatigue.   HENT:  Negative for congestion and sore throat.    Eyes:  Negative for discharge.   Respiratory:  Negative for cough, chest tightness and shortness of breath.    Cardiovascular:  Negative for chest pain and leg swelling.   Gastrointestinal:  Negative for abdominal pain, blood in stool, constipation, diarrhea, nausea and vomiting.   Endocrine: Negative for cold intolerance and heat intolerance.   Genitourinary:  Negative for difficulty urinating and hematuria.   Musculoskeletal:  Negative for arthralgias, back pain, gait problem, myalgias and neck pain.   Allergic/Immunologic: Negative for environmental allergies.   Neurological:  Negative for dizziness, syncope, weakness, numbness and headaches.   Hematological:  Negative for adenopathy. Does not bruise/bleed easily.   Psychiatric/Behavioral:  Negative for dysphoric mood. The patient is not nervous/anxious.    All other systems reviewed and are negative.      Objective   /72 (BP Location: Right arm, BP Cuff Size: Large  "adult)   Pulse 70   Temp 36.6 °C (97.8 °F)   Ht 1.638 m (5' 4.5\")   Wt 78.9 kg (174 lb)   SpO2 96%   BMI 29.41 kg/m²    Physical Exam  Vitals and nursing note reviewed.   Constitutional:       General: He is not in acute distress.     Appearance: Normal appearance.   HENT:      Head: Normocephalic and atraumatic.      Right Ear: Tympanic membrane, ear canal and external ear normal.      Left Ear: Tympanic membrane, ear canal and external ear normal.      Nose: Nose normal.      Mouth/Throat:      Mouth: Mucous membranes are moist.      Pharynx: Oropharynx is clear. No oropharyngeal exudate or posterior oropharyngeal erythema.   Eyes:      Extraocular Movements: Extraocular movements intact.      Conjunctiva/sclera: Conjunctivae normal.      Pupils: Pupils are equal, round, and reactive to light.   Cardiovascular:      Rate and Rhythm: Normal rate and regular rhythm.      Pulses: Normal pulses.      Heart sounds: Normal heart sounds. No murmur heard.  Pulmonary:      Effort: Pulmonary effort is normal. No respiratory distress.      Breath sounds: Normal breath sounds. No wheezing or rales.   Abdominal:      General: Abdomen is flat. Bowel sounds are normal. There is no distension.      Palpations: Abdomen is soft. There is no mass.      Tenderness: There is no abdominal tenderness.   Musculoskeletal:         General: No swelling or deformity. Normal range of motion.      Cervical back: Normal range of motion and neck supple.      Right lower leg: No edema.      Left lower leg: No edema.   Lymphadenopathy:      Cervical: No cervical adenopathy.   Skin:     General: Skin is warm and dry.      Capillary Refill: Capillary refill takes less than 2 seconds.      Findings: No lesion or rash.   Neurological:      General: No focal deficit present.      Mental Status: He is alert and oriented to person, place, and time.      Cranial Nerves: No cranial nerve deficit.      Motor: No weakness.   Psychiatric:         Mood " and Affect: Mood normal.         Behavior: Behavior normal.         Thought Content: Thought content normal.         Judgment: Judgment normal.         Assessment/Plan   Problem List Items Addressed This Visit          Respiratory    Acute bronchitis due to other specified organisms - Primary    Relevant Medications    cefdinir (Omnicef) 300 mg capsule       Circulatory    Congenital heart failure (CMS/HCC)    Primary hypertension    Paroxysmal atrial fibrillation (CMS/HCC)       Endocrine/Metabolic    Type 2 diabetes mellitus with hyperglycemia, without long-term current use of insulin (CMS/HCC)       Other    Cognitive deficits as late effect of cerebrovascular disease    Current every day smoker    Depression, major, in partial remission (CMS/HCC)    Attention deficit hyperactivity disorder, inattentive type       Patient education provided.  Stay current with age appropriate health maintenance as instructed.  Appointment here or ER with new or worsening symptoms'  Keep appropriate follow-up visit.  Stay current with proper immunizations   Recheck 3 months and as needed    Report suicidal ideation report psychotic or manic symptoms  Keep follow-up with specialist as well

## 2023-08-25 ENCOUNTER — OFFICE VISIT (OUTPATIENT)
Dept: PRIMARY CARE | Facility: CLINIC | Age: 53
End: 2023-08-25
Payer: MEDICARE

## 2023-08-25 VITALS
DIASTOLIC BLOOD PRESSURE: 78 MMHG | WEIGHT: 165.6 LBS | HEIGHT: 65 IN | SYSTOLIC BLOOD PRESSURE: 120 MMHG | BODY MASS INDEX: 27.59 KG/M2 | TEMPERATURE: 97.1 F | HEART RATE: 78 BPM | OXYGEN SATURATION: 96 %

## 2023-08-25 DIAGNOSIS — M79.671 FOOT PAIN, BILATERAL: ICD-10-CM

## 2023-08-25 DIAGNOSIS — R05.3 CHRONIC COUGH: ICD-10-CM

## 2023-08-25 DIAGNOSIS — F33.41 RECURRENT MAJOR DEPRESSIVE DISORDER, IN PARTIAL REMISSION (CMS-HCC): ICD-10-CM

## 2023-08-25 DIAGNOSIS — M79.672 FOOT PAIN, BILATERAL: ICD-10-CM

## 2023-08-25 DIAGNOSIS — F90.0 ATTENTION DEFICIT HYPERACTIVITY DISORDER, INATTENTIVE TYPE: ICD-10-CM

## 2023-08-25 DIAGNOSIS — I44.2 ATRIOVENTRICULAR BLOCK, COMPLETE (MULTI): Primary | ICD-10-CM

## 2023-08-25 DIAGNOSIS — F17.200 CURRENT EVERY DAY SMOKER: ICD-10-CM

## 2023-08-25 DIAGNOSIS — F33.2 MAJOR DEPRESSIVE DISORDER, RECURRENT SEVERE WITHOUT PSYCHOTIC FEATURES (MULTI): ICD-10-CM

## 2023-08-25 DIAGNOSIS — I10 PRIMARY HYPERTENSION: ICD-10-CM

## 2023-08-25 DIAGNOSIS — E11.65 TYPE 2 DIABETES MELLITUS WITH HYPERGLYCEMIA, WITHOUT LONG-TERM CURRENT USE OF INSULIN (MULTI): ICD-10-CM

## 2023-08-25 DIAGNOSIS — I69.919 COGNITIVE DEFICITS AS LATE EFFECT OF CEREBROVASCULAR DISEASE: ICD-10-CM

## 2023-08-25 DIAGNOSIS — I48.0 PAROXYSMAL ATRIAL FIBRILLATION (MULTI): ICD-10-CM

## 2023-08-25 DIAGNOSIS — E11.9 DIABETES MELLITUS WITHOUT COMPLICATION (MULTI): ICD-10-CM

## 2023-08-25 PROCEDURE — 99214 OFFICE O/P EST MOD 30 MIN: CPT | Performed by: FAMILY MEDICINE

## 2023-08-25 PROCEDURE — 3078F DIAST BP <80 MM HG: CPT | Performed by: FAMILY MEDICINE

## 2023-08-25 PROCEDURE — 4010F ACE/ARB THERAPY RXD/TAKEN: CPT | Performed by: FAMILY MEDICINE

## 2023-08-25 PROCEDURE — 3074F SYST BP LT 130 MM HG: CPT | Performed by: FAMILY MEDICINE

## 2023-08-25 PROCEDURE — 3044F HG A1C LEVEL LT 7.0%: CPT | Performed by: FAMILY MEDICINE

## 2023-08-25 RX ORDER — TELMISARTAN 20 MG/1
20 TABLET ORAL DAILY
Qty: 30 TABLET | Refills: 11 | Status: SHIPPED | OUTPATIENT
Start: 2023-08-25 | End: 2024-05-08 | Stop reason: SDUPTHER

## 2023-08-25 ASSESSMENT — ENCOUNTER SYMPTOMS
NUMBNESS: 0
NERVOUS/ANXIOUS: 0
VOMITING: 0
HEMATURIA: 0
DIFFICULTY URINATING: 0
WEAKNESS: 0
BLOOD IN STOOL: 0
SHORTNESS OF BREATH: 0
ADENOPATHY: 0
ABDOMINAL PAIN: 0
NAUSEA: 0
DYSPHORIC MOOD: 0
NECK PAIN: 0
DEPRESSION: 0
DIZZINESS: 0
DIARRHEA: 0
ARTHRALGIAS: 0
EYE DISCHARGE: 0
BACK PAIN: 0
BRUISES/BLEEDS EASILY: 0
CONSTIPATION: 0
LOSS OF SENSATION IN FEET: 0
MYALGIAS: 0
COUGH: 0
FATIGUE: 0
CHEST TIGHTNESS: 0
OCCASIONAL FEELINGS OF UNSTEADINESS: 0
HEADACHES: 0
ACTIVITY CHANGE: 0
SORE THROAT: 0

## 2023-08-25 ASSESSMENT — ACTIVITIES OF DAILY LIVING (ADL)
DOING_HOUSEWORK: INDEPENDENT
DRESSING: INDEPENDENT
MANAGING_FINANCES: INDEPENDENT
TAKING_MEDICATION: INDEPENDENT
GROCERY_SHOPPING: INDEPENDENT
BATHING: INDEPENDENT

## 2023-08-25 ASSESSMENT — PATIENT HEALTH QUESTIONNAIRE - PHQ9
SUM OF ALL RESPONSES TO PHQ9 QUESTIONS 1 AND 2: 0
1. LITTLE INTEREST OR PLEASURE IN DOING THINGS: NOT AT ALL
2. FEELING DOWN, DEPRESSED OR HOPELESS: NOT AT ALL

## 2023-08-25 NOTE — PROGRESS NOTES
Subjective   Reason for Visit: Brandt Hdz is an 52 y.o. male here for a Medicare Wellness visit and 4 month follow up on HTN and DM.     Discuss increased fatigue.     Discuss bilateral foot pain, while laying down. Fine while walking or sitting.     Past Medical, Surgical, and Family History reviewed and updated in chart.    Reviewed all medications by prescribing practitioner or clinical pharmacist (such as prescriptions, OTCs, herbal therapies and supplements) and documented in the medical record.    HPI  Patient here with multiple issues    Depression stable  No suicidal ideation no psychotic or manic symptoms    Diabetes stable  Watch blood work and lifestyle modification    Hypertension and atrial fibrillation and AV block stable keep cardio follow-up  No chest pain or shortness of breath    ADD stable  Inattentive type  No progression or worsening    Cognitive deficits after CVA  Stressed lifestyle modification    He is a smoker must quit    Chronic cough  On ACE inhibitor  We will switch to Micardis    Bilateral foot pain  Worse when she lays down in bed at night  Consider neuropathy  Discussed EMG for further circulation testing he declines but will begin with bilateral foot x-ray  Patient Care Team:  Mani Fortune MD as PCP - General  [facility] Fortune [facility]     Review of Systems   Constitutional:  Negative for activity change and fatigue.   HENT:  Negative for congestion and sore throat.    Eyes:  Negative for discharge.   Respiratory:  Negative for cough, chest tightness and shortness of breath.    Cardiovascular:  Negative for chest pain and leg swelling.   Gastrointestinal:  Negative for abdominal pain, blood in stool, constipation, diarrhea, nausea and vomiting.   Endocrine: Negative for cold intolerance and heat intolerance.   Genitourinary:  Negative for difficulty urinating and hematuria.   Musculoskeletal:  Negative for arthralgias, back pain, gait  "problem, myalgias and neck pain.   Allergic/Immunologic: Negative for environmental allergies.   Neurological:  Negative for dizziness, syncope, weakness, numbness and headaches.   Hematological:  Negative for adenopathy. Does not bruise/bleed easily.   Psychiatric/Behavioral:  Negative for dysphoric mood. The patient is not nervous/anxious.    All other systems reviewed and are negative.      Objective   Vitals:  /78 (BP Location: Right arm, BP Cuff Size: Large adult)   Pulse 78   Temp 36.2 °C (97.1 °F)   Ht 1.638 m (5' 4.5\")   Wt 75.1 kg (165 lb 9.6 oz)   SpO2 96%   BMI 27.99 kg/m²       Physical Exam  Vitals and nursing note reviewed.   Constitutional:       General: He is not in acute distress.     Appearance: Normal appearance.   HENT:      Head: Normocephalic and atraumatic.      Right Ear: Tympanic membrane, ear canal and external ear normal.      Left Ear: Tympanic membrane, ear canal and external ear normal.      Nose: Nose normal.      Mouth/Throat:      Mouth: Mucous membranes are moist.      Pharynx: Oropharynx is clear. No oropharyngeal exudate or posterior oropharyngeal erythema.   Eyes:      Extraocular Movements: Extraocular movements intact.      Conjunctiva/sclera: Conjunctivae normal.      Pupils: Pupils are equal, round, and reactive to light.   Cardiovascular:      Rate and Rhythm: Normal rate and regular rhythm.      Pulses: Normal pulses.      Heart sounds: Normal heart sounds. No murmur heard.  Pulmonary:      Effort: Pulmonary effort is normal. No respiratory distress.      Breath sounds: Normal breath sounds. No wheezing or rales.   Abdominal:      General: Abdomen is flat. Bowel sounds are normal. There is no distension.      Palpations: Abdomen is soft. There is no mass.      Tenderness: There is no abdominal tenderness.   Musculoskeletal:         General: No swelling or deformity. Normal range of motion.      Cervical back: Normal range of motion and neck supple.      Right " lower leg: No edema.      Left lower leg: No edema.   Lymphadenopathy:      Cervical: No cervical adenopathy.   Skin:     General: Skin is warm and dry.      Capillary Refill: Capillary refill takes less than 2 seconds.      Findings: No lesion or rash.   Neurological:      General: No focal deficit present.      Mental Status: He is alert and oriented to person, place, and time.      Cranial Nerves: No cranial nerve deficit.      Motor: No weakness.   Psychiatric:         Mood and Affect: Mood normal.         Behavior: Behavior normal.         Thought Content: Thought content normal.         Judgment: Judgment normal.     Patient has callus on the feet bilaterally and probable verruca he also has varicose veins of the lower extremities bilaterally which could contribute to his foot symptoms    Assessment/Plan   Problem List Items Addressed This Visit       Atrioventricular block, complete (CMS/HCC) - Primary    Cognitive deficits as late effect of cerebrovascular disease    Current every day smoker    Depression, major, in partial remission (CMS/HCC)    Primary hypertension    Relevant Medications    telmisartan (Micardis) 20 mg tablet    Other Relevant Orders    Comprehensive Metabolic Panel    Paroxysmal atrial fibrillation (CMS/HCC)    Type 2 diabetes mellitus with hyperglycemia, without long-term current use of insulin (CMS/HCC)    Overview     Diagnosis reviewed and stable  Follow-up in 1 year and sooner with issues           Relevant Orders    Albumin , Urine Random    Lipid Panel    Hemoglobin A1C    Follow Up In [facility] - PCP    Attention deficit hyperactivity disorder, inattentive type    Major depressive disorder, recurrent severe without psychotic features (CMS/HCC)    Diabetes mellitus without complication (CMS/HCC)    Overview     Last Assessment & Plan: Formatting of this note might be different from the original. Assessment: Stable on metformin         Chronic cough    Relevant Orders     XR chest 2 views     Other Visit Diagnoses       Foot pain, bilateral        Relevant Orders    XR foot right 3+ views    XR foot left 3+ views                   Patient education provided.  Stay current with age appropriate health maintenance as instructed.  Appointment here or ER with new or worsening symptoms'  Keep appropriate follow-up visit.  Stay current with proper immunizations   Report suicidal ideation report psychotic or manic symptoms testing as above follow-up visit    Recheck 3 months and as needed  Discussed at length with patient

## 2023-09-05 ENCOUNTER — TELEPHONE (OUTPATIENT)
Dept: PRIMARY CARE | Facility: CLINIC | Age: 53
End: 2023-09-05
Payer: MEDICARE

## 2023-09-05 NOTE — TELEPHONE ENCOUNTER
Patient called stating that he had some medication questions   He was told that something was to replace another medication but was unsure what it was

## 2023-09-19 DIAGNOSIS — E11.65 TYPE 2 DIABETES MELLITUS WITH HYPERGLYCEMIA, WITHOUT LONG-TERM CURRENT USE OF INSULIN (MULTI): ICD-10-CM

## 2023-09-19 RX ORDER — METFORMIN HYDROCHLORIDE 500 MG/1
500 TABLET ORAL DAILY
Qty: 90 TABLET | Refills: 3 | Status: SHIPPED | OUTPATIENT
Start: 2023-09-19 | End: 2024-05-08 | Stop reason: SDUPTHER

## 2023-09-19 NOTE — TELEPHONE ENCOUNTER
Rx Refill Request     Name: Brandt Hdz  :  1970   Medication Name:  metformin  Specific Pharmacy location:  [facility]   Date of last appointment:  23  Date of next appointment:  23  Best number to reach patient:  [phone]

## 2023-10-05 ENCOUNTER — OFFICE VISIT (OUTPATIENT)
Dept: PRIMARY CARE | Facility: CLINIC | Age: 53
End: 2023-10-05
Payer: MEDICARE

## 2023-10-05 VITALS
HEART RATE: 67 BPM | DIASTOLIC BLOOD PRESSURE: 72 MMHG | BODY MASS INDEX: 29.37 KG/M2 | SYSTOLIC BLOOD PRESSURE: 130 MMHG | WEIGHT: 172 LBS | TEMPERATURE: 97.3 F | HEIGHT: 64 IN

## 2023-10-05 DIAGNOSIS — N45.1 EPIDIDYMITIS: Primary | ICD-10-CM

## 2023-10-05 DIAGNOSIS — I48.0 PAROXYSMAL ATRIAL FIBRILLATION (MULTI): ICD-10-CM

## 2023-10-05 DIAGNOSIS — F33.2 MAJOR DEPRESSIVE DISORDER, RECURRENT SEVERE WITHOUT PSYCHOTIC FEATURES (MULTI): ICD-10-CM

## 2023-10-05 DIAGNOSIS — R10.31 RIGHT INGUINAL PAIN: ICD-10-CM

## 2023-10-05 DIAGNOSIS — Z00.00 ANNUAL PHYSICAL EXAM: ICD-10-CM

## 2023-10-05 DIAGNOSIS — R30.0 DYSURIA: ICD-10-CM

## 2023-10-05 DIAGNOSIS — E11.65 TYPE 2 DIABETES MELLITUS WITH HYPERGLYCEMIA, WITHOUT LONG-TERM CURRENT USE OF INSULIN (MULTI): ICD-10-CM

## 2023-10-05 DIAGNOSIS — I10 PRIMARY HYPERTENSION: ICD-10-CM

## 2023-10-05 PROCEDURE — 87086 URINE CULTURE/COLONY COUNT: CPT

## 2023-10-05 PROCEDURE — 90686 IIV4 VACC NO PRSV 0.5 ML IM: CPT | Performed by: FAMILY MEDICINE

## 2023-10-05 PROCEDURE — 3075F SYST BP GE 130 - 139MM HG: CPT | Performed by: FAMILY MEDICINE

## 2023-10-05 PROCEDURE — 87800 DETECT AGNT MULT DNA DIREC: CPT

## 2023-10-05 PROCEDURE — 99214 OFFICE O/P EST MOD 30 MIN: CPT | Performed by: FAMILY MEDICINE

## 2023-10-05 PROCEDURE — 4010F ACE/ARB THERAPY RXD/TAKEN: CPT | Performed by: FAMILY MEDICINE

## 2023-10-05 PROCEDURE — 3078F DIAST BP <80 MM HG: CPT | Performed by: FAMILY MEDICINE

## 2023-10-05 PROCEDURE — 3044F HG A1C LEVEL LT 7.0%: CPT | Performed by: FAMILY MEDICINE

## 2023-10-05 PROCEDURE — G0008 ADMIN INFLUENZA VIRUS VAC: HCPCS | Performed by: FAMILY MEDICINE

## 2023-10-05 RX ORDER — DOXYCYCLINE 100 MG/1
100 CAPSULE ORAL 2 TIMES DAILY
Qty: 20 CAPSULE | Refills: 0 | Status: SHIPPED | OUTPATIENT
Start: 2023-10-05 | End: 2023-10-15

## 2023-10-05 RX ORDER — BUPROPION HYDROCHLORIDE 150 MG/1
150 TABLET ORAL
COMMUNITY
Start: 2023-09-13 | End: 2024-02-16 | Stop reason: WASHOUT

## 2023-10-05 ASSESSMENT — ENCOUNTER SYMPTOMS
ARTHRALGIAS: 0
NAUSEA: 0
DYSPHORIC MOOD: 0
CHEST TIGHTNESS: 0
ABDOMINAL PAIN: 0
HEMATURIA: 0
HEADACHES: 0
VOMITING: 0
SORE THROAT: 0
DIFFICULTY URINATING: 0
NUMBNESS: 0
ADENOPATHY: 0
FATIGUE: 0
ACTIVITY CHANGE: 0
NECK PAIN: 0
CONSTIPATION: 0
EYE DISCHARGE: 0
SHORTNESS OF BREATH: 0
WEAKNESS: 0
BRUISES/BLEEDS EASILY: 0
MYALGIAS: 0
DIARRHEA: 0
BLOOD IN STOOL: 0
BACK PAIN: 0
DIZZINESS: 0
COUGH: 0
NERVOUS/ANXIOUS: 0

## 2023-10-05 NOTE — PROGRESS NOTES
"Subjective   Patient ID: Brandt Hdz is a 52 y.o. male who presents for Testicle Pain.  HPI    Testicular pain on and off no OTC med  Question about shingles vaccine   Discuss xray results    Groin pain  Present for about a month  Right-sided  Sudden onset of discomfort  No hernia appreciated    A-fib stable keep follow-up with cardio    Hypertension controlled no chest pain or shortness of breath    Diabetes stable  Follow diet  Watch blood work as appropriate    Depressed mood ongoing  Stress from lack of employment  No suicidal ideation no psychotic or manic symptoms         Review of Systems   Constitutional:  Negative for activity change and fatigue.   HENT:  Negative for congestion and sore throat.    Eyes:  Negative for discharge.   Respiratory:  Negative for cough, chest tightness and shortness of breath.    Cardiovascular:  Negative for chest pain and leg swelling.   Gastrointestinal:  Negative for abdominal pain, blood in stool, constipation, diarrhea, nausea and vomiting.   Endocrine: Negative for cold intolerance and heat intolerance.   Genitourinary:  Negative for difficulty urinating and hematuria.   Musculoskeletal:  Negative for arthralgias, back pain, gait problem, myalgias and neck pain.   Allergic/Immunologic: Negative for environmental allergies.   Neurological:  Negative for dizziness, syncope, weakness, numbness and headaches.   Hematological:  Negative for adenopathy. Does not bruise/bleed easily.   Psychiatric/Behavioral:  Negative for dysphoric mood. The patient is not nervous/anxious.    All other systems reviewed and are negative.      Objective   /72 (BP Location: Right arm, BP Cuff Size: Adult)   Pulse 67   Temp 36.3 °C (97.3 °F) (Temporal)   Ht 1.626 m (5' 4\")   Wt 78 kg (172 lb)   BMI 29.52 kg/m²    Physical Exam  Vitals and nursing note reviewed.   Constitutional:       General: He is not in acute distress.     Appearance: Normal appearance.   HENT:      Head: " Normocephalic and atraumatic.      Right Ear: Tympanic membrane, ear canal and external ear normal.      Left Ear: Tympanic membrane, ear canal and external ear normal.      Nose: Nose normal.      Mouth/Throat:      Mouth: Mucous membranes are moist.      Pharynx: Oropharynx is clear. No oropharyngeal exudate or posterior oropharyngeal erythema.   Eyes:      Extraocular Movements: Extraocular movements intact.      Conjunctiva/sclera: Conjunctivae normal.      Pupils: Pupils are equal, round, and reactive to light.   Cardiovascular:      Rate and Rhythm: Normal rate and regular rhythm.      Pulses: Normal pulses.      Heart sounds: Normal heart sounds. No murmur heard.  Pulmonary:      Effort: Pulmonary effort is normal. No respiratory distress.      Breath sounds: Normal breath sounds. No wheezing or rales.   Abdominal:      General: Abdomen is flat. Bowel sounds are normal. There is no distension.      Palpations: Abdomen is soft. There is no mass.      Tenderness: There is no abdominal tenderness.   Musculoskeletal:         General: No swelling or deformity. Normal range of motion.      Cervical back: Normal range of motion and neck supple.      Right lower leg: No edema.      Left lower leg: No edema.   Lymphadenopathy:      Cervical: No cervical adenopathy.   Skin:     General: Skin is warm and dry.      Capillary Refill: Capillary refill takes less than 2 seconds.      Findings: No lesion or rash.   Neurological:      General: No focal deficit present.      Mental Status: He is alert and oriented to person, place, and time.      Cranial Nerves: No cranial nerve deficit.      Motor: No weakness.   Psychiatric:         Mood and Affect: Mood normal.         Behavior: Behavior normal.         Thought Content: Thought content normal.         Judgment: Judgment normal.     No hernia no testicular masses noted bilaterally  tenderness at the right epididymis    Assessment/Plan   Problem List Items Addressed This  Visit       Primary hypertension    Paroxysmal atrial fibrillation (CMS/[facility])    Type 2 diabetes mellitus with hyperglycemia, without long-term current use of insulin (CMS/[facility])    Major depressive disorder, recurrent severe without psychotic features (CMS/[facility])     Other Visit Diagnoses       Epididymitis    -  Primary    Relevant Medications    doxycycline (Vibramycin) 100 mg capsule    Other Relevant Orders    US scrotum    C. Trachomatis / N. Gonorrhoeae, Amplified Detection    Right inguinal pain        Relevant Orders    Urine Culture    C. Trachomatis / N. Gonorrhoeae, Amplified Detection    Annual physical exam        Relevant Orders    Flu vaccine (IIV4) age 6 months and greater, preservative free (Completed)    Dysuria        Relevant Orders    C. Trachomatis / N. Gonorrhoeae, Amplified Detection            Patient education provided.  Stay current with age appropriate health maintenance as instructed.  Appointment here or ER with new or worsening symptoms'  Keep appropriate follow-up visit.  Stay current with proper immunizations   Urine culture  GC chlamydia test  Ultrasound scrotum  Doxycycline trial  Patient would like flu vaccination  Reviewed x-ray of foot results with arthritis he declines podiatry evaluation    Patient has had his shingles vaccination  Recheck 3 months and as needed  Report suicidal ideation report psychotic or manic symptoms  Urology evaluation with persistent groin symptoms

## 2023-10-06 LAB
C TRACH RRNA SPEC QL NAA+PROBE: NEGATIVE
N GONORRHOEA DNA SPEC QL PROBE+SIG AMP: NEGATIVE

## 2023-10-07 LAB — BACTERIA UR CULT: NORMAL

## 2023-10-11 ENCOUNTER — HOSPITAL ENCOUNTER (EMERGENCY)
Facility: HOSPITAL | Age: 53
Discharge: HOME | End: 2023-10-11
Attending: STUDENT IN AN ORGANIZED HEALTH CARE EDUCATION/TRAINING PROGRAM
Payer: MEDICARE

## 2023-10-11 ENCOUNTER — HOSPITAL ENCOUNTER (OUTPATIENT)
Dept: RADIOLOGY | Facility: HOSPITAL | Age: 53
Discharge: HOME | End: 2023-10-11
Payer: MEDICARE

## 2023-10-11 ENCOUNTER — APPOINTMENT (OUTPATIENT)
Dept: RADIOLOGY | Facility: HOSPITAL | Age: 53
End: 2023-10-11
Payer: MEDICARE

## 2023-10-11 VITALS
SYSTOLIC BLOOD PRESSURE: 143 MMHG | BODY MASS INDEX: 27.32 KG/M2 | OXYGEN SATURATION: 98 % | HEART RATE: 73 BPM | TEMPERATURE: 97.5 F | WEIGHT: 170 LBS | HEIGHT: 66 IN | RESPIRATION RATE: 16 BRPM | DIASTOLIC BLOOD PRESSURE: 83 MMHG

## 2023-10-11 DIAGNOSIS — N45.1 EPIDIDYMITIS: ICD-10-CM

## 2023-10-11 DIAGNOSIS — N50.819 PAIN IN TESTICLE, UNSPECIFIED LATERALITY: Primary | ICD-10-CM

## 2023-10-11 PROCEDURE — 76870 US EXAM SCROTUM: CPT | Performed by: RADIOLOGY

## 2023-10-11 PROCEDURE — 76870 US EXAM SCROTUM: CPT

## 2023-10-11 PROCEDURE — 93976 VASCULAR STUDY: CPT | Performed by: RADIOLOGY

## 2023-10-11 PROCEDURE — 99283 EMERGENCY DEPT VISIT LOW MDM: CPT

## 2023-10-11 PROCEDURE — 93975 VASCULAR STUDY: CPT

## 2023-10-11 ASSESSMENT — LIFESTYLE VARIABLES
HAVE PEOPLE ANNOYED YOU BY CRITICIZING YOUR DRINKING: NO
EVER FELT BAD OR GUILTY ABOUT YOUR DRINKING: NO
REASON UNABLE TO ASSESS: YES
EVER HAD A DRINK FIRST THING IN THE MORNING TO STEADY YOUR NERVES TO GET RID OF A HANGOVER: NO

## 2023-10-11 ASSESSMENT — COLUMBIA-SUICIDE SEVERITY RATING SCALE - C-SSRS
2. HAVE YOU ACTUALLY HAD ANY THOUGHTS OF KILLING YOURSELF?: NO
6. HAVE YOU EVER DONE ANYTHING, STARTED TO DO ANYTHING, OR PREPARED TO DO ANYTHING TO END YOUR LIFE?: NO
1. IN THE PAST MONTH, HAVE YOU WISHED YOU WERE DEAD OR WISHED YOU COULD GO TO SLEEP AND NOT WAKE UP?: NO

## 2023-10-11 ASSESSMENT — PAIN DESCRIPTION - PAIN TYPE: TYPE: ACUTE PAIN

## 2023-10-11 ASSESSMENT — PAIN SCALES - GENERAL: PAINLEVEL_OUTOF10: 0 - NO PAIN

## 2023-10-11 ASSESSMENT — PAIN - FUNCTIONAL ASSESSMENT: PAIN_FUNCTIONAL_ASSESSMENT: 0-10

## 2023-10-11 ASSESSMENT — PAIN DESCRIPTION - LOCATION: LOCATION: GROIN

## 2023-10-11 NOTE — ED PROVIDER NOTES
HPI   Chief Complaint   Patient presents with    Groin Swelling     Pain to right side.       Patient is a 52-year-old male presenting to the emergency department at the direction of his primary care doctor for scrotal ultrasound findings.  Patient states that over the past 2 weeks has been having intermittent episodes of right scrotal pain that come and go with no exacerbating or alleviating factors.  He denies any penile discharge, dysuria, hematuria, traumatic injuries, fevers, chills, nausea, vomiting.      History provided by:  Patient   used: No                        Marcelina Coma Scale Score: 15                  Patient History   Past Medical History:   Diagnosis Date    Acute bronchitis 02/08/2023    Atrioventricular block, complete (CMS/HCC) 07/12/2022    Atrioventricular block, complete    Cough 02/08/2023    Essential (primary) hypertension 05/24/2022    HTN (hypertension)    Foot pain, bilateral 02/08/2023    Hemiplegia, unspecified affecting left nondominant side (CMS/HCC) 08/20/2020    Left hemiparesis    Hemiplegia, unspecified affecting left nondominant side (CMS/HCC) 08/20/2020    Left hemiparesis    Hemiplegia, unspecified affecting left nondominant side (CMS/HCC) 08/20/2020    Left hemiparesis    Hemiplegia, unspecified affecting left nondominant side (CMS/HCC) 08/20/2020    Left hemiparesis    History of bipolar disorder 11/01/2019    Last Assessment & Plan: Formatting of this note might be different from the original. Assessment: Stable at this time On multiple medications Follows with Psychiatry    History of sick sinus syndrome 11/01/2019    History of stroke 01/03/2020    Obstructive and reflux uropathy, unspecified 04/07/2017    Obstructive uropathy    Onychomycosis 02/08/2023    Other conditions influencing health status 08/20/2020    Stroke syndrome    Other sleep disorders 03/28/2014    Sleep paralysis    Personal history of (corrected) congenital malformations of  heart and circulatory system 01/25/2022    History of congenital heart block    Personal history of colonic polyps 07/20/2020    Personal history of colonic polyps    Personal history of other endocrine, nutritional and metabolic disease 09/15/2015    History of hyperlipidemia    Personal history of other specified conditions 09/03/2020    History of memory loss    Presence of cardiac pacemaker 07/12/2022    Cardiac pacemaker    Unspecified viral hepatitis C without hepatic coma 07/21/2016    Viral hepatitis C without hepatic coma    URI (upper respiratory infection) 02/08/2023     Past Surgical History:   Procedure Laterality Date    APPENDECTOMY      APPENDECTOMY  04/27/2018    Appendectomy    CARDIAC PACEMAKER PLACEMENT  07/09/2018    Pacemaker Placement    MEDIASTINAL MASS EXCISION      OTHER SURGICAL HISTORY  08/24/2020    Colonoscopy complete for polypectomy    OTHER SURGICAL HISTORY  07/20/2020    Colonoscopy    OTHER SURGICAL HISTORY  04/07/2017    Patent Ductus Arteriosus Repair    OTHER SURGICAL HISTORY  04/07/2017    Mediastinoscopy With Biopsy Of Mediastinal Mass    PATENT DUCTUS ARTERIOUS LIGATION       Family History   Problem Relation Name Age of Onset    Stroke Mother      Cancer Mother      Other (cardiac disorder) Mother      Lymphoma Mother      Lung cancer Mother      Colon cancer Mother      Colon cancer Father      Diabetes Father      Stroke Father      Other (Other) Father      Cancer Father      Ulcerative colitis Brother       Social History     Tobacco Use    Smoking status: Every Day     Types: Cigarettes     Passive exposure: Never    Smokeless tobacco: Never   Vaping Use    Vaping Use: Every day   Substance Use Topics    Alcohol use: Never    Drug use: Never       Physical Exam   ED Triage Vitals [10/11/23 1636]   Temp Heart Rate Resp BP   36.4 °C (97.5 °F) 73 16 143/83      SpO2 Temp Source Heart Rate Source Patient Position   98 % Temporal Monitor --      BP Location FiO2 (%)     --  --       Physical Exam  Vitals and nursing note reviewed.   Constitutional:       General: He is not in acute distress.     Appearance: Normal appearance. He is not ill-appearing, toxic-appearing or diaphoretic.   HENT:      Head: Normocephalic and atraumatic.      Nose: Nose normal.      Mouth/Throat:      Mouth: Mucous membranes are dry.      Pharynx: No oropharyngeal exudate or posterior oropharyngeal erythema.   Eyes:      General: No scleral icterus.     Extraocular Movements: Extraocular movements intact.      Pupils: Pupils are equal, round, and reactive to light.   Cardiovascular:      Rate and Rhythm: Normal rate and regular rhythm.      Pulses: Normal pulses.      Heart sounds: Normal heart sounds. No murmur heard.     No friction rub. No gallop.   Pulmonary:      Effort: Pulmonary effort is normal. No respiratory distress.      Breath sounds: Normal breath sounds. No stridor. No wheezing, rhonchi or rales.   Chest:      Chest wall: No tenderness.   Abdominal:      General: Abdomen is flat. There is no distension.      Palpations: Abdomen is soft. There is no mass.      Tenderness: There is no abdominal tenderness. There is no guarding.      Hernia: No hernia is present. There is no hernia in the left inguinal area or right inguinal area.   Genitourinary:     Penis: Normal and circumcised.       Testes: Cremasteric reflex is present.         Right: Tenderness present. Mass, swelling, testicular hydrocele or varicocele not present. Cremasteric reflex is present.          Left: Tenderness present. Mass, swelling, testicular hydrocele or varicocele not present. Cremasteric reflex is present.    Musculoskeletal:         General: No swelling, tenderness, deformity or signs of injury. Normal range of motion.      Cervical back: Normal range of motion and neck supple. No rigidity.   Skin:     General: Skin is warm and dry.      Capillary Refill: Capillary refill takes less than 2 seconds.      Coloration: Skin is  not jaundiced or pale.      Findings: No bruising, erythema, lesion or rash.   Neurological:      General: No focal deficit present.      Mental Status: He is alert and oriented to person, place, and time. Mental status is at baseline.   Psychiatric:         Mood and Affect: Mood normal.         Behavior: Behavior normal.         ED Course & MDM   Diagnoses as of 10/11/23 2014   Pain in testicle, unspecified laterality       Medical Decision Making  Patient is a 52-year-old male presenting to the emergency department for complaints of right testicular pain and abnormal outpatient ultrasound findings that was concerning for possible partial infarcted testicle.  I spoke with on-call urologist Dr. Flanagan who recommended repeating the ultrasound and these orders were placed.  Patient had a unremarkable testicular exam aside from bilateral testicular tenderness on palpation.    Repeat ultrasound showed stable appearance of the testicular abnormality.  These results were followed up with urologist Dr. Flanagan who recommended discharge and follow-up outpatient with strict return precautions.  Patient was advised of his imaging findings and specialist recommendations and he is in agreement with this.  All questions were answered.  Patient was appreciative care and agreeable to discharge.    Amount and/or Complexity of Data Reviewed  External Data Reviewed: radiology.     Details: Ultrasound scrotum with Dopplers at 10/11/2023 9:52 AM: This patient presents with right testis pain      Borderline to small right varicocele      Small right hydrocele      No other contributory abnormality in the right hemiscrotum, intra  testis or extra testis      Apparent abnormality involving or adjacent to the lower pole left  testis as I have detailed above could theoretically be sequela of a  partial (less than 25%) left testis infarct; however, I suspect that  could be artifactual given the patient is not presently nor was  recently  symptomatic on the left side. Question of some subcapsular  abnormality such as a hematoma if there was trauma; or, perhaps  capsular thickening of uncertain etiology and significance. There is  a demonstrably separate left epididymal body. The normal left  epididymis does not account for this abnormality      NO COMPELLING SONOGRAPHIC EVIDENCE OF EPIDIDYMITIS AND/OR ORCHITIS on  either side      Although unlikely given absence of symptoms on the involved side, due  to the even remote seeming possibility of a partial left testis  infarct, I would designate this report emergent  Radiology: ordered. Decision-making details documented in ED Course.    Labs Reviewed - No data to display  US scrotum w doppler   Final Result   Stable appearance of left testicular abnormality, a crescent-shaped   area of hypoechogenicity and relatively decreased vascularity   involving the inferior testicle. A segmental infarct may appear   similar, however, as the patient is reportedly asymptomatic an acute   infarct is considered unlikely. Also, intratesticular hematoma and   focal orchitis are considered unlikely given the clinical   presentation and lack of hypervascularity. An infiltrative mass, such   as lymphoma may be considered, however, this more typically   demonstrates normal or increased vascularity. Urology follow-up is   recommended and a the short-term follow-up ultrasound may be   considered to assess stability.        Unremarkable appearance of the right testicle.        MACRO:   None        Signed by: Daxa Hoover 10/11/2023 7:49 PM   Dictation workstation:   [id number]            Procedure  Procedures     Stevie Wong DO  10/11/23 2014

## 2023-10-11 NOTE — ED TRIAGE NOTES
Pt states was called by PCP to come into ED due to abnormal US on testicle. Pt states was having right sided testicle pain, currently not having any pain. Pt states unsure why he is here,  Just told him he should come to ED. Pt has no complaints at this time.

## 2023-10-16 ENCOUNTER — HOSPITAL ENCOUNTER (OUTPATIENT)
Dept: CARDIOLOGY | Facility: HOSPITAL | Age: 53
Discharge: HOME | End: 2023-10-16
Payer: MEDICARE

## 2023-10-16 DIAGNOSIS — Z95.0 CARDIAC PACEMAKER IN SITU: ICD-10-CM

## 2023-10-16 DIAGNOSIS — Z95.0 CARDIAC PACEMAKER IN SITU: Primary | ICD-10-CM

## 2023-10-16 DIAGNOSIS — I44.2 ATRIOVENTRICULAR BLOCK, COMPLETE (MULTI): ICD-10-CM

## 2023-10-16 PROCEDURE — 93294 REM INTERROG EVL PM/LDLS PM: CPT | Performed by: INTERNAL MEDICINE

## 2023-10-16 PROCEDURE — 93296 REM INTERROG EVL PM/IDS: CPT

## 2023-10-25 ENCOUNTER — TELEPHONE (OUTPATIENT)
Dept: PRIMARY CARE | Facility: CLINIC | Age: 53
End: 2023-10-25
Payer: MEDICARE

## 2023-11-02 DIAGNOSIS — N50.9 DISORDER OF MALE GENITAL ORGANS, UNSPECIFIED: Primary | ICD-10-CM

## 2023-11-06 DIAGNOSIS — F33.2 MAJOR DEPRESSIVE DISORDER, RECURRENT SEVERE WITHOUT PSYCHOTIC FEATURES (MULTI): ICD-10-CM

## 2023-11-06 RX ORDER — DULOXETIN HYDROCHLORIDE 30 MG/1
30 CAPSULE, DELAYED RELEASE ORAL DAILY
Qty: 30 CAPSULE | Refills: 5 | Status: SHIPPED | OUTPATIENT
Start: 2023-11-06

## 2023-11-06 NOTE — TELEPHONE ENCOUNTER
Rx Refill Request     Name: Brandt Hdz  :  1970   Medication Name:  Duloxetine 30 mg  Specific Pharmacy location:  "[facility]" [id number] - [place], [place] - [address]   Date of last appointment:  10/05/2023   Date of next appointment:  2023  Best number to reach patient:  [phone]

## 2023-11-09 ENCOUNTER — LAB (OUTPATIENT)
Dept: LAB | Facility: LAB | Age: 53
End: 2023-11-09
Payer: MEDICARE

## 2023-11-09 DIAGNOSIS — N50.9 DISORDER OF MALE GENITAL ORGANS, UNSPECIFIED: ICD-10-CM

## 2023-11-09 LAB — PSA SERPL-MCNC: 1.01 NG/ML

## 2023-11-09 PROCEDURE — 36415 COLL VENOUS BLD VENIPUNCTURE: CPT

## 2023-11-09 PROCEDURE — 84153 ASSAY OF PSA TOTAL: CPT

## 2023-11-11 DIAGNOSIS — I42.9 CARDIOMYOPATHY, UNSPECIFIED (MULTI): ICD-10-CM

## 2023-11-13 DIAGNOSIS — I51.9 LEFT VENTRICULAR SYSTOLIC DYSFUNCTION: ICD-10-CM

## 2023-11-13 RX ORDER — BUPROPION HYDROCHLORIDE 300 MG/1
300 TABLET ORAL
COMMUNITY
Start: 2023-10-25 | End: 2024-04-18 | Stop reason: SDUPTHER

## 2023-11-13 RX ORDER — LAMOTRIGINE 200 MG/1
1 TABLET ORAL 2 TIMES DAILY
COMMUNITY
Start: 2023-10-25 | End: 2024-02-28 | Stop reason: SDUPTHER

## 2023-11-13 RX ORDER — SULFAMETHOXAZOLE AND TRIMETHOPRIM 800; 160 MG/1; MG/1
1 TABLET ORAL 2 TIMES DAILY
COMMUNITY
Start: 2023-10-16 | End: 2024-02-16 | Stop reason: WASHOUT

## 2023-11-13 RX ORDER — LISINOPRIL 5 MG/1
5 TABLET ORAL DAILY
COMMUNITY
Start: 2023-11-06 | End: 2023-11-13 | Stop reason: SDUPTHER

## 2023-11-13 RX ORDER — LISINOPRIL 5 MG/1
5 TABLET ORAL DAILY
Qty: 90 TABLET | Refills: 3 | Status: SHIPPED | OUTPATIENT
Start: 2023-11-13 | End: 2024-03-08 | Stop reason: ALTCHOICE

## 2023-11-14 RX ORDER — FUROSEMIDE 20 MG/1
20 TABLET ORAL DAILY
Qty: 90 TABLET | Refills: 3 | Status: SHIPPED | OUTPATIENT
Start: 2023-11-14

## 2023-11-21 ENCOUNTER — APPOINTMENT (OUTPATIENT)
Dept: PRIMARY CARE | Facility: CLINIC | Age: 53
End: 2023-11-21
Payer: MEDICARE

## 2023-12-28 DIAGNOSIS — N52.9 ERECTILE DYSFUNCTION, UNSPECIFIED ERECTILE DYSFUNCTION TYPE: Primary | ICD-10-CM

## 2023-12-29 RX ORDER — TADALAFIL 20 MG/1
20 TABLET ORAL DAILY PRN
Qty: 10 TABLET | Refills: 2 | Status: SHIPPED | OUTPATIENT
Start: 2023-12-29 | End: 2024-12-28

## 2024-01-19 NOTE — PROGRESS NOTES
Patient Name: Brandt Hdz   Provider: Isidro Renner MD  : 1970  Date of Service: 2024  Referring: No ref. provider found    DIAGNOSES:     1. Patent ductus arteriosis   a.  s/p PDA ligation via left thoracotomy ([facility])  2. Congenital complete heart block s/p Initial PPM  ()  a.  PPM implant (Dr. Joiner)   b. Abandoned RV lead from , active RA lead from   c. 2018 Status post upgrade to CRT-P 2018.Medtronic W4 TR 01 biventricular pacemaker  3. Nonischemic Cardiomyopathy, HFrecEF  a. Cardiac cath  Normal Coronaries; EF 45 %  b. TTE 2022 Dilated LA, mild MR, LV EF ~ 53%  c. Echo 2023 Dilated LA, mild MR, LVEF 55% with abnormal longitudinal strain  4. 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 Smoker 1 pack per day x 35 years  6. Legally blind  7. HTN  a. Cardiac Stress Test 3/2022 with normal BP response     CC: Follow Up Visit [facility], last seen 2023      History of present Illness:    Dear  No ref. provider found    I saw Brandt Hdz today in [facility], [facility] and [facility] [place] at [place].     Mr. Hdz is a 52 y/o male with h/o PDA, s/p ligation. He has congenital CHB. In 2018, his PPM was upgraded to a CRT-P Medtronic W4 TR 01 BiV pacemaker. He has non ischemic cardiomyopathy, now with recovered EF. He has a significant PMH, most notably for CVA on anticoagulation. He is a smoker, obese, with sleep apnea with chronic EUCEDA.      At his visit in , Mr. Hdz complained of SOB with light activity around house, climbing one flight of stairs. We felt he was volume overloaded with CVP around 16 mmHg, and started Lasix 20 mg a day which has been continued. Currently, he is down about 20 pounds but does not believe his EUCEDA has improved. No orthopnea, rare palpitations with anxiety, no edema, believes his waist is smaller. He can climb a flight of  stairs where he lives without stopping.     He takes coumadin daily, INR checked 1x/month managed by PCP. Patient is legally blind with history of nystagmus. His history is also remarkable for panic attacks, once weekly. He follows with psychiatry and is prescribed propanolol for anxiety. He has chronic neuropathy on left arm and left leg with weakness post CVA.      PPM interrogated q6 months remotely. Follows with Dr. Blue. He is here today for routine follow up.      INTERVAL HISTORY:   Today he reports           ROS:   General: no fatigue, no fever, no weight loss, no excessive sweating, no decreased appetite  HEENT: no facial swelling, no hoarseness, no eye redness, no eye lid swelling  Cardiac: no fainting, no cyanosis, no chest pain, no palpitations  Respiratory: no shortness of breath, no coughing blood, no noisy breathing, no wheezing, no cough  GI: No abdomen pain, no constipation, no diarrhea, no vomiting  Musculoskeletal: no extremity swelling  Skin: no paleness, no rash, no yellow skin  Hematologic: no easy bruising, no easy bleeding  Neurologic: no seizures, no weakness    All systems negative unless otherwise stated.    CURRENT MEDS:    Current Outpatient Medications:     buPROPion XL (Wellbutrin XL) 150 mg 24 hr tablet, Take 1 tablet (150 mg) by mouth once daily in the morning. Take before meals., Disp: , Rfl:     buPROPion XL (Wellbutrin XL) 300 mg 24 hr tablet, Take 1 tablet (300 mg) by mouth once daily in the morning. Take before meals., Disp: , Rfl:     DULoxetine (Cymbalta) 30 mg DR capsule, TAKE 1 CAPSULE BY MOUTH DAILY, Disp: 30 capsule, Rfl: 5    Eliquis 5 mg tablet, Take 1 tablet (5 mg) by mouth 2 times a day., Disp: , Rfl:     furosemide (Lasix) 20 mg tablet, TAKE 1 TABLET BY MOUTH DAILY, Disp: 90 tablet, Rfl: 3    gabapentin (Neurontin) 400 mg capsule, Take 1 capsule (400 mg) by mouth 2 times a day., Disp: , Rfl:     lamoTRIgine (LaMICtal) 150 mg tablet, , Disp: , Rfl:     lamoTRIgine  (LaMICtal) 200 mg tablet, Take 1 tablet (200 mg) by mouth 2 times a day., Disp: , Rfl:     lisinopril 5 mg tablet, Take 1 tablet (5 mg) by mouth once daily., Disp: 90 tablet, Rfl: 3    metFORMIN (Glucophage) 500 mg tablet, Take 1 tablet (500 mg) by mouth once daily., Disp: 90 tablet, Rfl: 3    naltrexone (Depade) 50 mg tablet, Take 1 tablet (50 mg) by mouth once daily at bedtime., Disp: , Rfl:     propranolol (Inderal) 20 mg tablet, Take 1 tablet (20 mg) by mouth 3 times a day., Disp: , Rfl:     sulfamethoxazole-trimethoprim (Bactrim DS) 800-160 mg tablet, Take 1 tablet by mouth 2 times a day., Disp: , Rfl:     tadalafil (Cialis) 20 mg tablet, Take 1 tablet (20 mg) by mouth once daily as needed for erectile dysfunction., Disp: 10 tablet, Rfl: 2    telmisartan (Micardis) 20 mg tablet, Take 1 tablet (20 mg) by mouth once daily., Disp: 30 tablet, Rfl: 11    terbinafine (LamISIL) 250 mg tablet, Take 1 tablet (250 mg) by mouth once daily., Disp: , Rfl:     Vraylar 4.5 mg capsule, , Disp: , Rfl:     PHYSICAL EXAM:  There were no vitals taken for this visit.  There is no height or weight on file to calculate BMI.    General: Well appearing and in no distress  HEENT: Moist mucous membranes  Neck: Supple, JVP normal, Carotid upstrokes brisk bilaterally and without bruits  Respiratory: Clear to ausculation bilaterally; no wheezing/rales/rhonchi; respirations non-labored  Cardiovascular: RRR,normal PMI, normal S1 and S2. No S3 or S4. No murmurs or rubs.   Gastrointestinal: soft, non-tender, no organomegaly, no abnormal aortic pulsation  Extremities: warm and well perfused with no cyanosis, clubbing, or edema  Neurologic: awake/alert, no focal deficits    DATA/Results:    Echo 1/24/2023 images reviewed and interpreted by Isidro Renner MD  Left atrial enlargement  Mild MR  Device seen in Rt sided chambers  Normal LV size and function, abnormal global LV strain -17  Biplane LVEF 55%     Cardiac Stress Test 3/2022  1. Submaximal  "stress test.  2. Appropriate HR and blood pressure response.  3. The submaximal level of stress was achieved     Echo 1/25/2022 images reviewed and interpreted by Isidro Renner MD  h/o PDA ligation  Mildly diminished LV function, LVEF ~53 % (improved from previous ECHO 2016)  Severely Dilated LA  Mild MR     PPM lead in RV with trivial TR, unable to measure RVSP  Elevated filling pressures, most likely diastolic dysfunction        Echo 2016    1. The left ventricular systolic function is mildly to moderately decreased.   2. There is moderate asymmetric left ventricular hypertrophy.   3. The left atrium is severely dilated.   4. RVSP within normal limits.   5. The calculated ejection fraction is mild to moderately decreased at 39 % using the Nelson's Bi-plane MOD calculation.     Cardiac Cath 2016  Dr. Perea  Normal Coronaries  LV EF 45%     WBC   Date Value Ref Range Status   02/26/2023 9.9 4.4 - 11.3 x10E9/L Final     Hemoglobin   Date Value Ref Range Status   02/26/2023 14.9 13.5 - 17.5 g/dL Final     MCV   Date Value Ref Range Status   02/26/2023 89 80 - 100 fL Final     Creatinine   Date Value Ref Range Status   02/26/2023 1.14 0.50 - 1.30 mg/dL Final   03/07/2022 1.03 0.50 - 1.30 mg/dL Final       No components found for: \"MAGNESIUM\"  Total Protein   Date Value Ref Range Status   02/26/2023 7.5 6.4 - 8.2 g/dL Final     Albumin   Date Value Ref Range Status   02/26/2023 4.8 3.4 - 5.0 g/dL Final     ALT (SGPT)   Date Value Ref Range Status   02/26/2023 12 10 - 52 U/L Final     Comment:      Patients treated with Sulfasalazine may generate    falsely decreased results for ALT.       AST   Date Value Ref Range Status   02/26/2023 17 9 - 39 U/L Final     INR   Date Value Ref Range Status   04/14/2023 1.5 (H) 0.9 - 1.1 Final     aPTT   Date Value Ref Range Status   02/26/2023 37 26 - 39 sec Final     Comment:       THE APTT IS NO LONGER USED FOR MONITORING     UNFRACTIONATED HEPARIN THERAPY.    FOR MONITORING " HEPARIN THERAPY,     USE THE HEPARIN ASSAY.           ASSESSMENT & PLAN:      Mr. Hdz is a 53 y/oM with h/o PDA ligation, nonischemic cardiomyopathy,HFrecEF and chronic diastolic HF, congenital CHB s/p PPM implant with upgrade to BiV pacing/CRT in 2018. He was started on Lasix ~ 1 year ago and is down 20 lbs and with CVP ~ 10 mmHg today. He underwent Cardiac stress test in March 2022 with normal BP response. Clinically, he is doing well with complaints of chronic EUCEDA. NYHA II-III     1. Patent ductus arteriosis   - s/p ligation via left thoracotomy with no residual shunt      Echo today shows no residual shunt, this does not require any future imaging     2. Congenital complete heart block  - s/p PPM with upgrade to CRT in 2018, doing well  - Continue Transmissions q6 months with annual EP office visit      3. HFrecEF- NYHA II-III  - TTE 2016 EF 45%  - TTE 1/25/2022 EF ~ 53% with evidence of elevated filling pressures  - TTE 1/24/2023 LV EF 55%, abnormal LV strain  - TTE 1/24/23: Normal LV function however his longitudinal strain is abnormal.    Symptoms of EUCEDA, he is now euvolemic with no improvement in symptoms. This may be pulmonary (chronic smoker 35 pack year)      - Improved EF after CRT therapy  - chronic diastolic HF     CVP ~ ___________  today and euvolemic on Lasix 20 mg daily     - Continue on Lasix 20 mg daily   - Weight checks daily     Echo today shows   Even though his LV has recovered with pacing and CRT, we will start patient on ACEI, which will also provide renal protection given his DM and may mitigate against some of his BP spikes.      LIsinopril 5 mg daily  RFP in 1 week     Consider increasing Lasix at next visit if kidney function is normal and unchanged with addition of ACEI.     4. HTN  Hypertensive at times with SBPs in the 140s. He underwent exercise stress test in March 2022 which showed a normal BP response to exercise.      5. History of stroke- on TANNA      The patient was seen and  examined with the attending cardiologist Dr. Jud Kent MD PGY- 6  Pediatric Cardiology Fellow  Service pager: [phone]      Thank you for allowing me to participate in the care of this patient. Please don't hesitate to contact us with any questions or concerns.    Sincerely,    Isidro Renner MD, MSc  Director, Adult Congenital Heart Disease Program  [facility] [place]

## 2024-01-22 ENCOUNTER — TELEPHONE (OUTPATIENT)
Dept: PEDIATRIC CARDIOLOGY | Facility: HOSPITAL | Age: 54
End: 2024-01-22
Payer: MEDICARE

## 2024-01-22 NOTE — TELEPHONE ENCOUNTER
01/22/24 at 9:51 AM     Attempted to reach: Patient   [phone]     Call went to voicemail, left message confirming appointment as follows:    Dr. Renner  Location:  [place]  Date: 1/23  Time: 8:00    [facility] and asked for return call to confirm medications and allergies.        - Sharonda Espinoza RN  [facility] Patient Navigator  [phone]

## 2024-01-23 ENCOUNTER — APPOINTMENT (OUTPATIENT)
Dept: PEDIATRIC CARDIOLOGY | Facility: CLINIC | Age: 54
End: 2024-01-23
Payer: MEDICARE

## 2024-02-01 NOTE — PROGRESS NOTES
Patient Name: Brandt Hdz   Provider: Isidro Renner MD  : 1970  Date of Service: 2024  Referring: No ref. provider found    DIAGNOSES:     1. PDA  a.  s/p PDA ligation via left thoracotomy ([facility])  2. Congenital CHB s/p Initial PPM  ()  a.  PPM implant (Dr. Joiner)   b. Abandoned RV lead from , active RA lead from   c. 2018 Status post upgrade to CRT-P 2018.Medtronic W4 TR 01 biventricular pacemaker  3. Nonischemic Cardiomyopathy, HFrecEF  a. Cardiac cath  Normal Coronaries; EF 45 %  b. TTE 2022 Dilated LA, mild MR, LV EF ~ 53%  c. Echo 2023 Dilated LA, mild MR, LVEF 55% with abnormal longitudinal strain  4. H/o embolic CVA , on AC- Coumadin  a. Recent stroke like event on 2023- CT Brain negative for acute infarct/bleed  5. Smoker 1 pack per day x 35 years  6. Legally blind  7. HTN  a. Cardiac Stress Test 3/2022 with normal BP response      INTERVAL HISTORY:     Dear  No ref. provider found    I saw Brandt Hdz today in the [facility],  [facility] and [facility] [place] at [facility] multi specialty clinic.  He was last seen by me 3/7/23.    Mr. Hdz is a 54 y/o male with h/o PDA, s/p ligation. He has congenital CHB. In 2018, his PPM was upgraded to a CRT-P Medtronic W4 TR 01 BiV pacemaker. He has non ischemic cardiomyopathy, now with recovered EF.     He has a significant PMH, most notably for CVA on anticoagulation. He is a smoker with sleep apnea with chronic EUCEDA.      At his visit in , Mr. Hdz complained of SOB with light activity around house, climbing one flight of stairs. We felt he was volume overloaded with CVP around 16 mmHg, and started Lasix 20 mg a day which has been continued. Currently, he is down about 20 pounds but does not believe his EUCEDA has improved. No orthopnea, rare palpitations with anxiety, no edema, believes  his waist is smaller. He can climb a flight of stairs where he lives without stopping.     On 2/28/2023, patient was brought to the ER by EMS for stroke alert. It was evening time and he lost partial vision and had LE weakness. His mom called 911 and patient was brought to [facility]. He had left arm drift and vision loss in right eye. He underwent CT Brain with contrast which showed chronic infarct, unchanged from 2021. He was later assessed without neurological symptoms and was sent home the next day. His INR was sub-therapeutic at the time of the event (1.4). His coumadin dose has been adjusted by his PCP.     Since this event, he feels disoriented in the morning. He has added Melatonin 20 mg daily to his medication regimen about 6 months ago and has been sleeping well at night. He continues to smoke daily, ~ 3/4 pack.      Patient is legally blind with history of nystagmus. He believes he still has focal vision loss in one part of visual field in right eye. Has eye doctor appt next week.      H/o of panic attacks, once weekly. He follows with psychiatry and is prescribed propanolol for anxiety.  He has chronic neuropathy on left arm and left leg with weakness post CVA.      PPM interrogated q6 months remotely. Follows with Dr. Blue.     Today he reports:    ROS:   General: no fatigue, no fever, no weight loss, no excessive sweating, no decreased appetite  HEENT: no facial swelling, no hoarseness, no eye redness, no eye lid swelling  Cardiac: no fainting, no cyanosis, no chest pain, no palpitations  Respiratory: no shortness of breath, no coughing blood, no noisy breathing, no wheezing, no cough  GI: No abdomen pain, no constipation, no diarrhea, no vomiting  Musculoskeletal: no extremity swelling  Skin: no paleness, no rash, no yellow skin  Hematologic: no easy bruising, no easy bleeding  Neurologic: no seizures, no weakness    All systems negative unless otherwise stated.    CURRENT MEDS:    Current Outpatient  Medications:     buPROPion XL (Wellbutrin XL) 150 mg 24 hr tablet, Take 1 tablet (150 mg) by mouth once daily in the morning. Take before meals., Disp: , Rfl:     buPROPion XL (Wellbutrin XL) 300 mg 24 hr tablet, Take 1 tablet (300 mg) by mouth once daily in the morning. Take before meals., Disp: , Rfl:     DULoxetine (Cymbalta) 30 mg DR capsule, TAKE 1 CAPSULE BY MOUTH DAILY, Disp: 30 capsule, Rfl: 5    Eliquis 5 mg tablet, Take 1 tablet (5 mg) by mouth 2 times a day., Disp: , Rfl:     furosemide (Lasix) 20 mg tablet, TAKE 1 TABLET BY MOUTH DAILY, Disp: 90 tablet, Rfl: 3    gabapentin (Neurontin) 400 mg capsule, Take 1 capsule (400 mg) by mouth 2 times a day., Disp: , Rfl:     lamoTRIgine (LaMICtal) 150 mg tablet, , Disp: , Rfl:     lamoTRIgine (LaMICtal) 200 mg tablet, Take 1 tablet (200 mg) by mouth 2 times a day., Disp: , Rfl:     lisinopril 5 mg tablet, Take 1 tablet (5 mg) by mouth once daily., Disp: 90 tablet, Rfl: 3    metFORMIN (Glucophage) 500 mg tablet, Take 1 tablet (500 mg) by mouth once daily., Disp: 90 tablet, Rfl: 3    naltrexone (Depade) 50 mg tablet, Take 1 tablet (50 mg) by mouth once daily at bedtime., Disp: , Rfl:     propranolol (Inderal) 20 mg tablet, Take 1 tablet (20 mg) by mouth 3 times a day., Disp: , Rfl:     sulfamethoxazole-trimethoprim (Bactrim DS) 800-160 mg tablet, Take 1 tablet by mouth 2 times a day., Disp: , Rfl:     tadalafil (Cialis) 20 mg tablet, Take 1 tablet (20 mg) by mouth once daily as needed for erectile dysfunction., Disp: 10 tablet, Rfl: 2    telmisartan (Micardis) 20 mg tablet, Take 1 tablet (20 mg) by mouth once daily., Disp: 30 tablet, Rfl: 11    terbinafine (LamISIL) 250 mg tablet, Take 1 tablet (250 mg) by mouth once daily., Disp: , Rfl:     Vraylar 4.5 mg capsule, , Disp: , Rfl:     PHYSICAL EXAM:  There were no vitals taken for this visit.  There is no height or weight on file to calculate BMI.    General: Well appearing and in no distress  HEENT: Moist mucous  membranes  Neck: Supple, JVP normal, Carotid upstrokes brisk bilaterally and without bruits  Respiratory: Clear to ausculation bilaterally; no wheezing/rales/rhonchi; respirations non-labored  Cardiovascular: RRR,normal PMI, normal S1 and S2. No S3 or S4. No murmurs or rubs.   Gastrointestinal: soft, non-tender, no organomegaly, no abnormal aortic pulsation  Extremities: warm and well perfused with no cyanosis, clubbing, or edema  Neurologic: awake/alert, no focal deficits    DATA:    Echo 1/24/2023 images reviewed and interpreted by Isidro Renner MD  Left atrial enlargement  Mild MR  Device seen in Rt sided chambers  Normal LV size and function, abnormal global LV strain -17  Biplane LVEF 55%     Cardiac Stress Test 3/2022  1. Submaximal stress test.  2. Appropriate HR and blood pressure response.  3. The submaximal level of stress was achieved     Echo 1/25/2022 images reviewed and interpreted by Isidro Renner MD  h/o PDA ligation  Mildly diminished LV function, LVEF ~53 % (improved from previous ECHO 2016)  Severely Dilated LA  Mild MR     PPM lead in RV with trivial TR, unable to measure RVSP  Elevated filling pressures, most likely diastolic dysfunction        Echo 2016    1. The left ventricular systolic function is mildly to moderately decreased.   2. There is moderate asymmetric left ventricular hypertrophy.   3. The left atrium is severely dilated.   4. RVSP within normal limits.   5. The calculated ejection fraction is mild to moderately decreased at 39 % using the Nelson's Bi-plane MOD calculation.     Cardiac Cath 2016  Dr. Perea  Normal Coronaries  LV EF 45%         WBC   Date Value Ref Range Status   02/26/2023 9.9 4.4 - 11.3 x10E9/L Final     Hemoglobin   Date Value Ref Range Status   02/26/2023 14.9 13.5 - 17.5 g/dL Final     MCV   Date Value Ref Range Status   02/26/2023 89 80 - 100 fL Final     Creatinine   Date Value Ref Range Status   02/26/2023 1.14 0.50 - 1.30 mg/dL Final   03/07/2022  "1.03 0.50 - 1.30 mg/dL Final       No components found for: \"MAGNESIUM\"  Total Protein   Date Value Ref Range Status   02/26/2023 7.5 6.4 - 8.2 g/dL Final     Albumin   Date Value Ref Range Status   02/26/2023 4.8 3.4 - 5.0 g/dL Final     ALT (SGPT)   Date Value Ref Range Status   02/26/2023 12 10 - 52 U/L Final     Comment:      Patients treated with Sulfasalazine may generate    falsely decreased results for ALT.       AST   Date Value Ref Range Status   02/26/2023 17 9 - 39 U/L Final     INR   Date Value Ref Range Status   04/14/2023 1.5 (H) 0.9 - 1.1 Final     aPTT   Date Value Ref Range Status   02/26/2023 37 26 - 39 sec Final     Comment:       THE APTT IS NO LONGER USED FOR MONITORING     UNFRACTIONATED HEPARIN THERAPY.    FOR MONITORING HEPARIN THERAPY,     USE THE HEPARIN ASSAY.           ASSESSMENT & PLAN:      1. PDA  s/p ligation via left thoracotomy with no residual shunt      Echo 1/2023 no residual shunt, this does not require any future imaging     2. Congenital CHB  PPM with upgrade to CRT in 2018  Follows with Dr. Blue     3. HFrecEF  TTE 2016 EF 45%  TTE 1/25/2022 EF ~ 53% with evidence of elevated filling pressures  Echo 1/24/2023 LV EF 55%, abnormal LV strain  NYHA II-III     Symptoms of EUCEDA, he is now euvolemic with no improvement in symptoms. This may be pulmonary (chronic smoker 35 pack year)  Improved EF after CRT therapy  chronic diastolic HF  ON AC for h/o stroke     CVP ~ 5-10mmHG today and euvolemic on Lasix 20 mg daily - continue this dose  Weight checks daily     LIsinopril 5 mg daily***, continue     4. HTN  Hypertensive has improved with addition of Lisinopril***. His SBP today is 130s. He underwent exercise stress test in March 2022 which showed a normal BP response to exercise.     Continue Lisinopril 5 mg***     5. h/o Stroke  Right MCA and right occipital stroke 2021, ? secondary to PAF, unknown last incidence of afib     Event 2/26/2023 with LE weakness and vision loss with " suspicion for stroke. CT Brain negative for acute event.      Patient is on Coumadin, managed by PCP, recently increased after hospital admission for subtherapeutic INR.          Thank you for allowing me to participate in the care of this patient.  Please don't hesitate to contact us with any questions or concerns.    Sincerely,    Isidro Renner MD, MSc  Director, Adult Congenital Heart Disease Program  [facility] [place]

## 2024-02-06 ENCOUNTER — HOSPITAL ENCOUNTER (OUTPATIENT)
Dept: CARDIOLOGY | Facility: HOSPITAL | Age: 54
Discharge: HOME | End: 2024-02-06
Payer: MEDICAID

## 2024-02-06 DIAGNOSIS — I44.2 ATRIOVENTRICULAR BLOCK, COMPLETE (MULTI): ICD-10-CM

## 2024-02-06 DIAGNOSIS — E11.40 CHRONIC PAINFUL DIABETIC NEUROPATHY (MULTI): ICD-10-CM

## 2024-02-06 DIAGNOSIS — Z95.0 CARDIAC PACEMAKER IN SITU: ICD-10-CM

## 2024-02-06 PROCEDURE — 93294 REM INTERROG EVL PM/LDLS PM: CPT | Performed by: INTERNAL MEDICINE

## 2024-02-06 PROCEDURE — 93296 REM INTERROG EVL PM/IDS: CPT

## 2024-02-06 RX ORDER — GABAPENTIN 400 MG/1
400 CAPSULE ORAL 2 TIMES DAILY
Qty: 180 CAPSULE | Refills: 1 | Status: SHIPPED | OUTPATIENT
Start: 2024-02-06 | End: 2024-05-16 | Stop reason: SDUPTHER

## 2024-02-06 NOTE — TELEPHONE ENCOUNTER
Rx Refill Request     Name: Brandt Hdz  :  1970   Medication Name:  gabapentin 400mg caps   Specific Pharmacy location:  [facility]   Date of last appointment:  10/5/23  Date of next appointment:  Visit date not found   Best number to reach patient:  [phone]

## 2024-02-16 ENCOUNTER — OFFICE VISIT (OUTPATIENT)
Dept: CARDIOLOGY | Facility: CLINIC | Age: 54
End: 2024-02-16
Payer: MEDICARE

## 2024-02-16 ENCOUNTER — HOSPITAL ENCOUNTER (OUTPATIENT)
Dept: CARDIOLOGY | Facility: HOSPITAL | Age: 54
Discharge: HOME | End: 2024-02-16
Payer: MEDICAID

## 2024-02-16 VITALS
BODY MASS INDEX: 29.09 KG/M2 | HEIGHT: 66 IN | DIASTOLIC BLOOD PRESSURE: 74 MMHG | HEART RATE: 87 BPM | WEIGHT: 181 LBS | SYSTOLIC BLOOD PRESSURE: 118 MMHG

## 2024-02-16 DIAGNOSIS — Z71.89 ENCOUNTER TO DISCUSS TREATMENT OPTIONS: ICD-10-CM

## 2024-02-16 DIAGNOSIS — Q24.6 CONGENITAL HEART BLOCK (HHS-HCC): ICD-10-CM

## 2024-02-16 DIAGNOSIS — Z95.0 CARDIAC PACEMAKER IN SITU: ICD-10-CM

## 2024-02-16 DIAGNOSIS — F17.200 CURRENT EVERY DAY SMOKER: ICD-10-CM

## 2024-02-16 DIAGNOSIS — I44.2 ATRIOVENTRICULAR BLOCK, COMPLETE (MULTI): ICD-10-CM

## 2024-02-16 DIAGNOSIS — Z95.0 CARDIAC PACEMAKER: Primary | ICD-10-CM

## 2024-02-16 DIAGNOSIS — Z71.89 ENCOUNTER FOR MEDICATION REVIEW AND COUNSELING: ICD-10-CM

## 2024-02-16 DIAGNOSIS — I49.5 SINUS NODE DYSFUNCTION (MULTI): ICD-10-CM

## 2024-02-16 DIAGNOSIS — I50.9 CONGENITAL HEART FAILURE (MULTI): ICD-10-CM

## 2024-02-16 DIAGNOSIS — I42.9 CARDIOMYOPATHY, UNSPECIFIED TYPE (MULTI): ICD-10-CM

## 2024-02-16 DIAGNOSIS — E11.65 TYPE 2 DIABETES MELLITUS WITH HYPERGLYCEMIA, WITHOUT LONG-TERM CURRENT USE OF INSULIN (MULTI): ICD-10-CM

## 2024-02-16 DIAGNOSIS — I48.0 PAROXYSMAL ATRIAL FIBRILLATION (MULTI): ICD-10-CM

## 2024-02-16 DIAGNOSIS — Z79.899 HIGH RISK MEDICATION USE: ICD-10-CM

## 2024-02-16 PROCEDURE — 3078F DIAST BP <80 MM HG: CPT | Performed by: INTERNAL MEDICINE

## 2024-02-16 PROCEDURE — 3008F BODY MASS INDEX DOCD: CPT | Performed by: INTERNAL MEDICINE

## 2024-02-16 PROCEDURE — 99214 OFFICE O/P EST MOD 30 MIN: CPT | Performed by: INTERNAL MEDICINE

## 2024-02-16 PROCEDURE — 4010F ACE/ARB THERAPY RXD/TAKEN: CPT | Performed by: INTERNAL MEDICINE

## 2024-02-16 PROCEDURE — 93281 PM DEVICE PROGR EVAL MULTI: CPT

## 2024-02-16 PROCEDURE — 93290 INTERROG DEV EVAL ICPMS IP: CPT | Performed by: INTERNAL MEDICINE

## 2024-02-16 PROCEDURE — 93281 PM DEVICE PROGR EVAL MULTI: CPT | Performed by: INTERNAL MEDICINE

## 2024-02-16 PROCEDURE — 93000 ELECTROCARDIOGRAM COMPLETE: CPT | Mod: DISTINCT PROCEDURAL SERVICE | Performed by: INTERNAL MEDICINE

## 2024-02-16 PROCEDURE — 3074F SYST BP LT 130 MM HG: CPT | Performed by: INTERNAL MEDICINE
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
## 2024-02-16 NOTE — PATIENT INSTRUCTIONS
Continue same medications/treatment.  Patient educated on proper medication use.  Patient educated on risk factor modification.  Please bring any lab results from other providers/physicians to your next appointment.    Please bring all medicines, vitamins, and herbal supplements with you when you come to the office.    Prescriptions will not be filled unless you are compliant with your follow up appointments or have a follow up appointment scheduled as per instruction of your physician. Refills should be requested at the time of your visit.    Follow up with Bernadette in 6 months with device check  Continue remote check at 3 and 9 months    SHAKIR CORBIN RN, AM SCRIBING FOR AND IN THE PRESENCE OF DR. IVELISSE ARMIJO MD, FACC, FACP, RS

## 2024-02-26 ENCOUNTER — TELEPHONE (OUTPATIENT)
Dept: PEDIATRIC CARDIOLOGY | Facility: HOSPITAL | Age: 54
End: 2024-02-26
Payer: MEDICARE

## 2024-02-26 NOTE — TELEPHONE ENCOUNTER
02/26/24 at 11:26 AM     Spoke with: Patient   [phone]     Called to confirm upcoming appointment. Patient had to reschedule. Transferred to [facility] coordinator for rescheduling.         - Sharonda Espinoza RN  [facility] Patient Navigator  [phone]

## 2024-02-27 ENCOUNTER — APPOINTMENT (OUTPATIENT)
Dept: PEDIATRIC CARDIOLOGY | Facility: CLINIC | Age: 54
End: 2024-02-27
Payer: MEDICARE

## 2024-02-28 DIAGNOSIS — G40.109 FOCAL EPILEPSY (MULTI): Primary | ICD-10-CM

## 2024-02-28 PROBLEM — I48.91 ATRIAL FIBRILLATION (MULTI): Status: ACTIVE | Noted: 2023-02-08

## 2024-02-28 PROBLEM — I81 PORTAL VEIN THROMBOSIS: Status: ACTIVE | Noted: 2018-01-06

## 2024-02-28 PROBLEM — M67.919 DISORDER OF ROTATOR CUFF: Status: ACTIVE | Noted: 2024-02-28

## 2024-02-28 PROBLEM — I69.919: Status: ACTIVE | Noted: 2024-02-28

## 2024-02-28 PROBLEM — L74.513: Status: ACTIVE | Noted: 2023-02-08

## 2024-02-28 PROBLEM — F32.A DEPRESSIVE DISORDER: Status: ACTIVE | Noted: 2019-02-19

## 2024-02-28 PROBLEM — F17.200 SMOKES TOBACCO DAILY: Status: ACTIVE | Noted: 2023-02-08

## 2024-02-28 PROBLEM — E66.9 OBESITY WITH BODY MASS INDEX 30 OR GREATER: Status: ACTIVE | Noted: 2024-02-28

## 2024-02-28 PROBLEM — E11.9 TYPE 2 DIABETES MELLITUS (MULTI): Status: ACTIVE | Noted: 2023-02-08

## 2024-02-28 PROBLEM — J45.909 REACTIVE AIRWAY DISEASE (HHS-HCC): Status: ACTIVE | Noted: 2023-02-08

## 2024-02-28 PROBLEM — H52.7 DISORDER OF REFRACTION: Status: ACTIVE | Noted: 2018-05-14

## 2024-02-28 PROBLEM — I10 HYPERTENSION: Status: ACTIVE | Noted: 2023-02-08

## 2024-02-28 PROBLEM — R79.89 DECREASED TESTOSTERONE LEVEL: Status: ACTIVE | Noted: 2023-02-08

## 2024-02-28 PROBLEM — L57.0 KERATOSIS: Status: ACTIVE | Noted: 2023-02-08

## 2024-02-28 PROBLEM — R41.3 AMNESIA: Status: ACTIVE | Noted: 2024-02-28

## 2024-02-28 PROBLEM — L85.3 ASTEATOSIS CUTIS: Status: ACTIVE | Noted: 2023-02-08

## 2024-02-28 PROBLEM — R41.89 IMPAIRED COGNITION: Status: ACTIVE | Noted: 2023-02-08

## 2024-02-28 PROBLEM — K63.5 HYPERPLASTIC POLYP OF LARGE INTESTINE: Status: ACTIVE | Noted: 2024-02-28

## 2024-02-28 PROBLEM — R51.9 HEADACHE: Status: ACTIVE | Noted: 2024-02-28

## 2024-02-28 PROBLEM — I51.89 LEFT VENTRICULAR SYSTOLIC DYSFUNCTION (LVSD): Status: ACTIVE | Noted: 2023-02-08

## 2024-02-28 PROBLEM — G47.9 DISTURBANCE IN SLEEP BEHAVIOR: Status: ACTIVE | Noted: 2023-02-08

## 2024-02-28 PROBLEM — R26.89 ANTALGIC GAIT: Status: ACTIVE | Noted: 2023-02-08

## 2024-02-28 PROBLEM — F90.0 ATTENTION DEFICIT HYPERACTIVITY DISORDER (ADHD), PREDOMINANTLY INATTENTIVE TYPE: Status: ACTIVE | Noted: 2019-07-01

## 2024-02-28 PROBLEM — E66.9 OBESITY: Status: ACTIVE | Noted: 2024-02-28

## 2024-02-28 PROBLEM — N41.0 ACUTE PROSTATITIS: Status: ACTIVE | Noted: 2024-02-28

## 2024-02-28 PROBLEM — I51.9 LEFT VENTRICULAR SYSTOLIC DYSFUNCTION (LVSD): Status: ACTIVE | Noted: 2023-02-08

## 2024-02-28 PROBLEM — R26.9 ABNORMAL GAIT: Status: ACTIVE | Noted: 2020-01-03

## 2024-02-28 PROBLEM — R53.83 FATIGUE: Status: ACTIVE | Noted: 2020-01-21

## 2024-02-28 PROBLEM — F43.89 REACTION TO CHRONIC STRESS: Status: ACTIVE | Noted: 2023-02-08

## 2024-02-28 PROBLEM — R73.9 HYPERGLYCEMIA: Status: ACTIVE | Noted: 2020-01-03

## 2024-02-28 RX ORDER — LAMOTRIGINE 200 MG/1
200 TABLET ORAL 2 TIMES DAILY
Qty: 60 TABLET | Refills: 0 | Status: SHIPPED | OUTPATIENT
Start: 2024-02-28 | End: 2024-03-07

## 2024-02-28 NOTE — TELEPHONE ENCOUNTER
Rx Refill Request     Name: Brandt Hdz  :  1970   Medication Name:    lamoTRIgine (LaMICtal) 200 mg tablet     Last fill: 2024 30 day suppply Q 60  Specific Pharmacy location:  [facility]   Date of last appointment:  2023  Date of next appointment:  N/A  Best number to reach patient:  [phone]       RX PENDED to [facility]  as directed by fax      Pharmacy note Updated with:  Dr. Contreras Covering for Dr. Fortune

## 2024-03-07 DIAGNOSIS — G40.109 FOCAL EPILEPSY (MULTI): ICD-10-CM

## 2024-03-07 RX ORDER — LAMOTRIGINE 200 MG/1
200 TABLET ORAL 2 TIMES DAILY
Qty: 60 TABLET | Refills: 0 | Status: SHIPPED | OUTPATIENT
Start: 2024-03-07 | End: 2024-04-04

## 2024-03-07 NOTE — TELEPHONE ENCOUNTER
ADVANCED Rx Refill Request     Name: Brandt Hdz  :  1970   Medication Name:    lamoTRIgine (LaMICtal) 200 mg tablet    Last fill: 2024 30 day supply Q 60 R 0  REQUESTING ADVANCED REFILL  Specific Pharmacy location:  [facility]   Date of last appointment:  2023  Date of next appointment:  N/A  Best number to reach patient:  [phone]       RX PENDED to [facility] [place] as directed by electronic correspondence

## 2024-03-08 ENCOUNTER — TELEPHONE (OUTPATIENT)
Dept: PEDIATRIC CARDIOLOGY | Facility: HOSPITAL | Age: 54
End: 2024-03-08
Payer: MEDICARE

## 2024-03-08 ENCOUNTER — SOCIAL WORK (OUTPATIENT)
Dept: PEDIATRIC CARDIOLOGY | Facility: HOSPITAL | Age: 54
End: 2024-03-08
Payer: MEDICARE

## 2024-03-08 RX ORDER — ACETAMINOPHEN, DIPHENHYDRAMINE HCL, PHENYLEPHRINE HCL 325; 25; 5 MG/1; MG/1; MG/1
20 TABLET ORAL NIGHTLY PRN
COMMUNITY

## 2024-03-08 NOTE — PROGRESS NOTES
Was asked by RN, DUSTIN Espinoza to contact patient and assist him with arranging transportation to his appointment on Tuesday with Dr. Renner. I reviewed chart to see what insurance patient has and found that he has Medicare, which doesn't always offer a transportation benefit. I phoned patient and confirmed that he does have Medicare parts A and B and has [employer] for his pharmacy benefit. I explained that Medicare doesn't always provide transportation as a guaranteed benefit and encouraged him to call the number on the back of his card to determine whether he is eligible for non-emergency transportation through Medicare. I suggested he call after we hung up since there is often a timeframe within which rides must be scheduled ahead of an appointment. I asked that he call me back if he was not successful scheduling a ride via Medicare and we would come up with a plan B, he wrote my number down and expressed understanding  and agreement with said plan.     LUIS Rodríguez

## 2024-03-08 NOTE — TELEPHONE ENCOUNTER
03/08/24 at 1:09 PM     Spoke with: Patient   [phone]     Confirmed upcoming appointment as follows:  Dr. Renner  Location:  [place]  Date: 3/12  Time: 9:30    Reviewed medications and allergies. Patient said he may not be able to make the appt because he is having trouble with transportation right now. Asked if he would like to speak with our  to see if she can help arrange a ride, and he agreed.  Patient confirmed understanding of appointment details, no further questions or issues to be addressed. Encouraged reaching out if there was anything else needed.        - Sharonda Espinoza RN  [facility] Patient Navigator  [phone]

## 2024-04-02 DIAGNOSIS — G40.109 FOCAL EPILEPSY (MULTI): ICD-10-CM

## 2024-04-04 RX ORDER — LAMOTRIGINE 200 MG/1
200 TABLET ORAL 2 TIMES DAILY
Qty: 60 TABLET | Refills: 0 | Status: SHIPPED | OUTPATIENT
Start: 2024-04-04 | End: 2024-05-08 | Stop reason: SDUPTHER

## 2024-04-16 ENCOUNTER — APPOINTMENT (OUTPATIENT)
Dept: PRIMARY CARE | Facility: CLINIC | Age: 54
End: 2024-04-16
Payer: MEDICARE

## 2024-04-16 NOTE — PROGRESS NOTES
"Subjective   Patient ID: Brandt Hdz is a 53 y.o. male who presents for Medicare Annual Wellness Visit Subsequent and Cough.  HPI    Has had a cough for a few week gotten worse           Review of Systems    Objective   /82 (BP Location: Left arm, BP Cuff Size: Large adult)   Pulse 86   Temp 36.4 °C (97.5 °F) (Oral)   Ht 1.676 m (5' 6\")   Wt 81.9 kg (180 lb 9.6 oz)   SpO2 97%   BMI 29.15 kg/m²    Physical Exam    Assessment/Plan   Problem List Items Addressed This Visit    None      Patient education provided.  Stay current with age appropriate health maintenance as instructed.  Appointment here or ER with new or worsening symptoms'  Keep appropriate follow-up visit.  Stay current with proper immunizations     Subjective   Reason for Visit: Brandt Hdz is an 53 y.o. y.o. male here for a Medicare Wellness visit.               HPI    Patient Care Team:  Mani Fortnue MD as PCP - General  Mani Fortune MD as PCP - [facility] PCP     Review of Systems    Objective   Vitals:  /82 (BP Location: Left arm, BP Cuff Size: Large adult)   Pulse 86   Temp 36.4 °C (97.5 °F) (Oral)   Ht 1.676 m (5' 6\")   Wt 81.9 kg (180 lb 9.6 oz)   SpO2 97%   BMI 29.15 kg/m²       Physical Exam    Assessment/Plan   Problem List Items Addressed This Visit    None  Patient education provided.  Stay current with age appropriate health maintenance as instructed.  Appointment here or ER with new or worsening symptoms'  Keep appropriate follow-up visit.  Stay current with proper immunizations   "

## 2024-04-18 ENCOUNTER — OFFICE VISIT (OUTPATIENT)
Dept: PRIMARY CARE | Facility: CLINIC | Age: 54
End: 2024-04-18
Payer: MEDICARE

## 2024-04-18 VITALS
TEMPERATURE: 97.5 F | WEIGHT: 180.6 LBS | HEIGHT: 66 IN | DIASTOLIC BLOOD PRESSURE: 82 MMHG | OXYGEN SATURATION: 97 % | SYSTOLIC BLOOD PRESSURE: 143 MMHG | HEART RATE: 86 BPM | BODY MASS INDEX: 29.02 KG/M2

## 2024-04-18 DIAGNOSIS — Z00.00 ROUTINE GENERAL MEDICAL EXAMINATION AT HEALTH CARE FACILITY: Primary | ICD-10-CM

## 2024-04-18 DIAGNOSIS — G40.109 LOCALIZATION-RELATED FOCAL EPILEPSY WITH SIMPLE PARTIAL SEIZURES (MULTI): ICD-10-CM

## 2024-04-18 DIAGNOSIS — I10 PRIMARY HYPERTENSION: ICD-10-CM

## 2024-04-18 DIAGNOSIS — F01.A0 MILD VASCULAR DEMENTIA WITHOUT BEHAVIORAL DISTURBANCE, PSYCHOTIC DISTURBANCE, MOOD DISTURBANCE, OR ANXIETY (MULTI): ICD-10-CM

## 2024-04-18 DIAGNOSIS — F31.81 BIPOLAR II DISORDER (MULTI): ICD-10-CM

## 2024-04-18 DIAGNOSIS — I48.0 PAROXYSMAL ATRIAL FIBRILLATION (MULTI): ICD-10-CM

## 2024-04-18 DIAGNOSIS — J20.8 ACUTE BRONCHITIS DUE TO OTHER SPECIFIED ORGANISMS: ICD-10-CM

## 2024-04-18 DIAGNOSIS — F33.2 MAJOR DEPRESSIVE DISORDER, RECURRENT SEVERE WITHOUT PSYCHOTIC FEATURES (MULTI): ICD-10-CM

## 2024-04-18 DIAGNOSIS — F17.200 CURRENT EVERY DAY SMOKER: ICD-10-CM

## 2024-04-18 DIAGNOSIS — E11.65 TYPE 2 DIABETES MELLITUS WITH HYPERGLYCEMIA, WITHOUT LONG-TERM CURRENT USE OF INSULIN (MULTI): ICD-10-CM

## 2024-04-18 PROBLEM — G81.94 HEMIPARESIS, LEFT (MULTI): Status: RESOLVED | Noted: 2023-02-08 | Resolved: 2024-04-18

## 2024-04-18 PROCEDURE — 3077F SYST BP >= 140 MM HG: CPT | Performed by: FAMILY MEDICINE

## 2024-04-18 PROCEDURE — 99214 OFFICE O/P EST MOD 30 MIN: CPT | Performed by: FAMILY MEDICINE

## 2024-04-18 PROCEDURE — 4010F ACE/ARB THERAPY RXD/TAKEN: CPT | Performed by: FAMILY MEDICINE

## 2024-04-18 PROCEDURE — 3008F BODY MASS INDEX DOCD: CPT | Performed by: FAMILY MEDICINE

## 2024-04-18 PROCEDURE — 3079F DIAST BP 80-89 MM HG: CPT | Performed by: FAMILY MEDICINE

## 2024-04-18 PROCEDURE — G0439 PPPS, SUBSEQ VISIT: HCPCS | Performed by: FAMILY MEDICINE

## 2024-04-18 RX ORDER — BUPROPION HYDROCHLORIDE 300 MG/1
300 TABLET ORAL
Qty: 90 TABLET | Refills: 3 | Status: SHIPPED | OUTPATIENT
Start: 2024-04-18 | End: 2025-04-18

## 2024-04-18 RX ORDER — ALBUTEROL SULFATE 90 UG/1
2 AEROSOL, METERED RESPIRATORY (INHALATION) EVERY 4 HOURS PRN
Qty: 6.7 G | Refills: 0 | Status: SHIPPED | OUTPATIENT
Start: 2024-04-18 | End: 2025-04-18

## 2024-04-18 RX ORDER — CARIPRAZINE 4.5 MG/1
CAPSULE, GELATIN COATED ORAL
Qty: 90 CAPSULE | Refills: 3 | Status: SHIPPED | OUTPATIENT
Start: 2024-04-18

## 2024-04-18 RX ORDER — AZITHROMYCIN 250 MG/1
TABLET, FILM COATED ORAL DAILY
Qty: 6 TABLET | Refills: 0 | Status: SHIPPED | OUTPATIENT
Start: 2024-04-18 | End: 2024-04-22

## 2024-04-18 ASSESSMENT — ENCOUNTER SYMPTOMS
FATIGUE: 0
COUGH: 1
LOSS OF SENSATION IN FEET: 0
ACTIVITY CHANGE: 0
VOMITING: 0
EYE DISCHARGE: 0
MYALGIAS: 0
DIFFICULTY URINATING: 0
ABDOMINAL PAIN: 0
NERVOUS/ANXIOUS: 0
HEADACHES: 0
DIARRHEA: 0
BRUISES/BLEEDS EASILY: 0
NUMBNESS: 0
BACK PAIN: 0
NAUSEA: 0
DYSPHORIC MOOD: 0
ARTHRALGIAS: 0
NECK PAIN: 0
BLOOD IN STOOL: 0
WEAKNESS: 0
HEMATURIA: 0
OCCASIONAL FEELINGS OF UNSTEADINESS: 0
DIZZINESS: 0
SHORTNESS OF BREATH: 0
SORE THROAT: 0
CONSTIPATION: 0
DEPRESSION: 0
ADENOPATHY: 0
CHEST TIGHTNESS: 0

## 2024-04-18 ASSESSMENT — ACTIVITIES OF DAILY LIVING (ADL)
DOING_HOUSEWORK: INDEPENDENT
BATHING: INDEPENDENT
DRESSING: INDEPENDENT
TAKING_MEDICATION: INDEPENDENT
GROCERY_SHOPPING: INDEPENDENT
MANAGING_FINANCES: INDEPENDENT

## 2024-04-18 NOTE — PROGRESS NOTES
Subjective   Reason for Visit: Brandt Hdz is an 53 y.o. male here for a Medicare Wellness visit.     Past Medical, Surgical, and Family History reviewed and updated in chart.    Reviewed all medications by prescribing practitioner or clinical pharmacist (such as prescriptions, OTCs, herbal therapies and supplements) and documented in the medical record.    HPI  Patient here for annual Medicare wellness    Also general checkup    Hypertension stable no chest pain or shortness of breath    Diabetes with fair control watch diet follow blood work    Positive bronchitis  Present for several weeks  COVID-negative  Would like medicine declines further testing    A-fib stable keep follow-up with cardio    Patient smokes must quit    Depressed mood stable  No suicidal ideation no psychotic or manic symptoms    History of vascular dementia this is stable    Seizure disorder stable no recent seizures noted  Patient Care Team:  Mani Fortune MD as PCP - General  Opal Blue MD as Cardiologist (Cardiology)     Review of Systems   Constitutional:  Negative for activity change and fatigue.   HENT:  Negative for congestion and sore throat.    Eyes:  Negative for discharge.   Respiratory:  Positive for cough. Negative for chest tightness and shortness of breath.    Cardiovascular:  Negative for chest pain and leg swelling.   Gastrointestinal:  Negative for abdominal pain, blood in stool, constipation, diarrhea, nausea and vomiting.   Endocrine: Negative for cold intolerance and heat intolerance.   Genitourinary:  Negative for difficulty urinating and hematuria.   Musculoskeletal:  Negative for arthralgias, back pain, gait problem, myalgias and neck pain.   Allergic/Immunologic: Negative for environmental allergies.   Neurological:  Negative for dizziness, syncope, weakness, numbness and headaches.   Hematological:  Negative for adenopathy. Does not bruise/bleed easily.   Psychiatric/Behavioral:  Negative for dysphoric  "mood. The patient is not nervous/anxious.    All other systems reviewed and are negative.      Objective   Vitals:  /82 (BP Location: Left arm, BP Cuff Size: Large adult)   Pulse 86   Temp 36.4 °C (97.5 °F) (Oral)   Ht 1.676 m (5' 6\")   Wt 81.9 kg (180 lb 9.6 oz)   SpO2 97%   BMI 29.15 kg/m²       Physical Exam  Vitals and nursing note reviewed.   Constitutional:       General: He is not in acute distress.     Appearance: Normal appearance.   HENT:      Head: Normocephalic and atraumatic.      Right Ear: Tympanic membrane, ear canal and external ear normal.      Left Ear: Tympanic membrane, ear canal and external ear normal.      Nose: Nose normal.      Mouth/Throat:      Mouth: Mucous membranes are moist.      Pharynx: Oropharynx is clear. No oropharyngeal exudate or posterior oropharyngeal erythema.   Eyes:      Extraocular Movements: Extraocular movements intact.      Conjunctiva/sclera: Conjunctivae normal.      Pupils: Pupils are equal, round, and reactive to light.   Cardiovascular:      Rate and Rhythm: Normal rate and regular rhythm.      Pulses: Normal pulses.      Heart sounds: Normal heart sounds. No murmur heard.  Pulmonary:      Effort: Pulmonary effort is normal. No respiratory distress.      Breath sounds: Normal breath sounds. No wheezing or rales.   Abdominal:      General: Abdomen is flat. Bowel sounds are normal. There is no distension.      Palpations: Abdomen is soft. There is no mass.      Tenderness: There is no abdominal tenderness.   Musculoskeletal:         General: No swelling or deformity. Normal range of motion.      Cervical back: Normal range of motion and neck supple.      Right lower leg: No edema.      Left lower leg: No edema.   Lymphadenopathy:      Cervical: No cervical adenopathy.   Skin:     General: Skin is warm and dry.      Capillary Refill: Capillary refill takes less than 2 seconds.      Findings: No lesion or rash.   Neurological:      General: No focal " deficit present.      Mental Status: He is alert and oriented to person, place, and time.      Cranial Nerves: No cranial nerve deficit.      Motor: No weakness.   Psychiatric:         Mood and Affect: Mood normal.         Behavior: Behavior normal.         Thought Content: Thought content normal.         Judgment: Judgment normal.         Assessment/Plan   Problem List Items Addressed This Visit       Current every day smoker    Primary hypertension    Relevant Orders    Comprehensive Metabolic Panel    CBC and Auto Differential    Lipid Panel    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)    Paroxysmal atrial fibrillation (Multi)    Type 2 diabetes mellitus with hyperglycemia, without long-term current use of insulin (Multi)    Overview     Diagnosis reviewed and stable  Follow-up in 1 year and sooner with issues           Relevant Orders    Hemoglobin A1C    Major depressive disorder, recurrent severe without psychotic features (Multi)    Relevant Medications    buPROPion XL (Wellbutrin XL) 300 mg 24 hr tablet    Vraylar 4.5 mg capsule    Other Relevant Orders    Follow Up In [facility] - PCP    Acute bronchitis due to other specified organisms    Relevant Medications    azithromycin (Zithromax) 250 mg tablet    albuterol (Proventil HFA) 90 mcg/actuation inhaler    Bipolar II disorder (Multi)    Mild vascular dementia without behavioral disturbance, psychotic disturbance, mood disturbance, or anxiety (Multi)     Other Visit Diagnoses       Routine general medical examination at health care facility    -  Primary          Patient education provided.  Stay current with age appropriate health maintenance as instructed.  Appointment here or ER with new or worsening symptoms'  Keep appropriate follow-up visit.  Stay current with proper immunizations  Recheck 3 months and as needed  Medicine as above  Further workup with persistence  Stop smoking  Report suicidal ideation report  psychotic or manic symptoms

## 2024-04-29 ENCOUNTER — TELEPHONE (OUTPATIENT)
Dept: PRIMARY CARE | Facility: CLINIC | Age: 54
End: 2024-04-29
Payer: MEDICARE

## 2024-04-29 NOTE — TELEPHONE ENCOUNTER
Patient called said the forms that were faxed needs to have doctors information on them. I put information on them and refaxed them over to [phone] quit line.

## 2024-05-08 DIAGNOSIS — I10 PRIMARY HYPERTENSION: ICD-10-CM

## 2024-05-08 DIAGNOSIS — G40.109 FOCAL EPILEPSY (MULTI): ICD-10-CM

## 2024-05-08 DIAGNOSIS — E11.65 TYPE 2 DIABETES MELLITUS WITH HYPERGLYCEMIA, WITHOUT LONG-TERM CURRENT USE OF INSULIN (MULTI): ICD-10-CM

## 2024-05-08 NOTE — TELEPHONE ENCOUNTER
Rx Refill Request     Name: Brandt Hdz  :  1970   Medication Name:  metformin, te;misartan, lamotrigine    Specific Pharmacy location:  [facility]   Date of last appointment:  2024  Date of next appointment:  2024  Best number to reach patient:  [phone]

## 2024-05-09 RX ORDER — LAMOTRIGINE 200 MG/1
200 TABLET ORAL 2 TIMES DAILY
Qty: 60 TABLET | Refills: 0 | Status: SHIPPED | OUTPATIENT
Start: 2024-05-09 | End: 2024-06-08

## 2024-05-09 RX ORDER — METFORMIN HYDROCHLORIDE 500 MG/1
500 TABLET ORAL DAILY
Qty: 90 TABLET | Refills: 3 | Status: SHIPPED | OUTPATIENT
Start: 2024-05-09

## 2024-05-09 RX ORDER — TELMISARTAN 20 MG/1
20 TABLET ORAL DAILY
Qty: 30 TABLET | Refills: 11 | Status: SHIPPED | OUTPATIENT
Start: 2024-05-09 | End: 2025-05-09

## 2024-05-16 ENCOUNTER — TELEPHONE (OUTPATIENT)
Dept: PRIMARY CARE | Facility: CLINIC | Age: 54
End: 2024-05-16
Payer: MEDICARE

## 2024-05-16 DIAGNOSIS — E11.40 CHRONIC PAINFUL DIABETIC NEUROPATHY (MULTI): ICD-10-CM

## 2024-05-16 RX ORDER — GABAPENTIN 400 MG/1
400 CAPSULE ORAL 2 TIMES DAILY
Qty: 180 CAPSULE | Refills: 1 | Status: SHIPPED | OUTPATIENT
Start: 2024-05-16

## 2024-05-16 NOTE — TELEPHONE ENCOUNTER
Patient called in wondering if Dr. Fortune could write him a letter stating he needs an emotional support animal? He stated he is needing this for his housing. Patient asking for a call back regarding this  Please Advise

## 2024-05-16 NOTE — LETTER
May 16, 2024     Brandt Hdz  [address] [address]    Patient: Brandt Hdz   YOB: 1970   Date of Visit: 5/16/2024       To whom it may concern,    Brandt Hdz is my patient and has been under my care since. I am intimately familiar with his history and with the functional limitations imposed by his emotional/mental related illness. Brandt meets the definition of disability under the Americans with Disabilities Act, the Fair Housing Act and the Rehabilitation Act of 1973.    Due to this emotional/mental disability, Brandt has certain limitations related to social interaction, coping with stress/anxiety, etc. In order to help alleviate these difficulties and to enhance his ability to live independently and to fully use and enjoy the dwelling unit you own and/or administer, I have prescribed Brandt to obtain a pet or emotional support animal. The presence of this animal is necessary for the emotional/mental health of Brandt because its presence will mitigate the symptoms he is currently experiencing.      Sincerely,          Mani Fortune M.D.

## 2024-05-16 NOTE — TELEPHONE ENCOUNTER
Rx Refill Request     Name: Brandt Hdz  :  1970   Medication Name:  gabapentin   Specific Pharmacy location:  [facility]   Date of last appointment:     Date of next appointment:  24  Best number to reach patient:  [phone]

## 2024-05-22 ENCOUNTER — HOSPITAL ENCOUNTER (OUTPATIENT)
Dept: CARDIOLOGY | Facility: HOSPITAL | Age: 54
Discharge: HOME | End: 2024-05-22
Payer: MEDICARE

## 2024-05-22 DIAGNOSIS — Z95.0 CARDIAC PACEMAKER: ICD-10-CM

## 2024-05-22 PROCEDURE — 93294 REM INTERROG EVL PM/LDLS PM: CPT | Performed by: INTERNAL MEDICINE

## 2024-05-22 PROCEDURE — 93296 REM INTERROG EVL PM/IDS: CPT

## 2024-05-28 DIAGNOSIS — I48.0 PAROXYSMAL ATRIAL FIBRILLATION (MULTI): Primary | ICD-10-CM

## 2024-05-28 RX ORDER — APIXABAN 5 MG/1
5 TABLET, FILM COATED ORAL 2 TIMES DAILY
Qty: 180 TABLET | Refills: 1 | Status: SHIPPED | OUTPATIENT
Start: 2024-05-28

## 2024-05-28 NOTE — TELEPHONE ENCOUNTER
Rx Refill Request Telephone Encounter    Name:  Brandt Hdz  :  [id number]  Medication Name:  Eliquis 5 mg tablet   PT asked if Dr. Fortune would take over prescribing this medication as he cannot get a hold of the previous prescribing doctor.     Specific Pharmacy location:  [facility]  Date of last appointment:  24  Date of next appointment:  24  Best number to reach patient:  [phone]

## 2024-05-30 ENCOUNTER — TELEPHONE (OUTPATIENT)
Dept: PEDIATRIC CARDIOLOGY | Facility: HOSPITAL | Age: 54
End: 2024-05-30
Payer: MEDICARE

## 2024-05-30 RX ORDER — PROPRANOLOL HYDROCHLORIDE 20 MG/1
20 TABLET ORAL 3 TIMES DAILY
Qty: 90 TABLET | Refills: 0 | Status: CANCELLED | OUTPATIENT
Start: 2024-05-30 | End: 2024-08-28

## 2024-05-31 DIAGNOSIS — I10 PRIMARY HYPERTENSION: Primary | ICD-10-CM

## 2024-05-31 RX ORDER — PROPRANOLOL HYDROCHLORIDE 40 MG/1
40 TABLET ORAL 3 TIMES DAILY
Qty: 90 TABLET | Refills: 2 | Status: SHIPPED | OUTPATIENT
Start: 2024-05-31

## 2024-05-31 NOTE — TELEPHONE ENCOUNTER
05/31/24 at 11:32 AM   Spoke with: Patient   [phone]     Let patient know that Dr. Renner said because he takes propanolol for anxiety, he should ask his PCP or psychiatrist for a refill.  Discussed appointment details for July appt, let patient know he does NOT need an echo at that appointment.  Confirmed understanding, no further questions or issues to be addressed. Encouraged reaching out if there was anything else needed.     - Sharonda Espinoza RN  [facility] Patient Navigator  [phone]

## 2024-05-31 NOTE — TELEPHONE ENCOUNTER
----- Message from Isidro Renner MD sent at 5/31/2024  6:02 AM EDT -----  Regarding: RE: Propanolol refill  My notes say the propranolol is for anxiety.  He should request from his PCP or psychiatrist.    ----- Message -----  From: Sharonda Espinoza RN  Sent: 5/30/2024   3:22 PM EDT  To: Isidro Renner MD  Subject: Propanolol refill                                Marcelo Hdz asked for a propanolol refill. Since he's overdue, do you just want me to enter a 90d order w/out refills, or a full year? Shannan scheduled him for July.

## 2024-05-31 NOTE — TELEPHONE ENCOUNTER
05/31/24 at 11:28 AM   Attempted to contact: Patient   [phone]     Returned patient's return call.   Call went to voicemail, left message without any identifying PHI asking for return phone call and provided contact info for [facility].    - Sharonda Espinoza RN  [facility] Patient Navigator  [phone]

## 2024-05-31 NOTE — TELEPHONE ENCOUNTER
Rx Refill Request Telephone Encounter    Name:  Brandt Hdz  :  [id number]  Medication Name:  propranolol (Inderal) 20 mg   Specific Pharmacy location:  [facility]  Date of last appointment:    Date of next appointment:    Best number to reach patient:  [phone]           Patient wondering if Dr. Fortune could increase the dose?

## 2024-05-31 NOTE — TELEPHONE ENCOUNTER
05/31/24 at 8:37 AM   Attempted to contact: Patient   [phone]     Called patient to let him know he needs to request refill for propanolol from PCP or psychiatrist, since he takes it for anxiety.  Call went to voicemail, left message without any identifying PHI asking for return phone call and provided contact info for [facility] program.    - Sharonda Espinoza RN  [facility] Patient Navigator  [phone]

## 2024-06-27 ENCOUNTER — HOSPITAL ENCOUNTER (OUTPATIENT)
Dept: CARDIOLOGY | Facility: HOSPITAL | Age: 54
Discharge: HOME | End: 2024-06-27
Payer: MEDICARE

## 2024-06-27 DIAGNOSIS — Z95.0 CARDIAC PACEMAKER: ICD-10-CM

## 2024-07-05 DIAGNOSIS — G40.109 FOCAL EPILEPSY (MULTI): Primary | ICD-10-CM

## 2024-07-05 RX ORDER — LAMOTRIGINE 200 MG/1
200 TABLET ORAL 2 TIMES DAILY
Qty: 60 TABLET | Refills: 0 | Status: SHIPPED | OUTPATIENT
Start: 2024-07-05 | End: 2024-08-04

## 2024-07-05 NOTE — TELEPHONE ENCOUNTER
Rx Refill Request     Name: Brandt Hdz  :  1970   Medication Name:    lamoTRIgine (LaMICtal) 200 mg tablet    Last fill: 2024 30 day supply q 60 R 0  Specific Pharmacy location:  [facility]  Date of last appointment:  2024  Date of next appointment:  2024  Best number to reach patient:  [phone]       RX PENDED to [facility] as directed by Fax        Pharmacy note Updated with:  Dr. Iniguez Covering for Dr. Fortune

## 2024-07-08 ENCOUNTER — TELEPHONE (OUTPATIENT)
Dept: PEDIATRIC CARDIOLOGY | Facility: HOSPITAL | Age: 54
End: 2024-07-08
Payer: MEDICARE

## 2024-07-08 NOTE — TELEPHONE ENCOUNTER
07/08/24 at 1:22 PM     Spoke with: Patient   [phone]     Called patient to confirm 7/9 appt. Patient had forgotten about appt, transferred to coordinator to reschedule.    - Sharonda Espinoza RN  [facility] Patient Navigator  [phone]

## 2024-07-08 NOTE — PROGRESS NOTES
Patient Name: Brandt Hdz   Provider: Isidro Renner MD  : 1970  Date of Service: 2024  Referring: No ref. provider found    DIAGNOSES:     1. PDA  a.  s/p PDA ligation via left thoracotomy ([facility])  2. Congenital CHB s/p Initial PPM  ()  a.  PPM implant (Dr. Joiner)   b. Abandoned RV lead from , active RA lead from   c. 2018 Status post upgrade to CRT-P 2018.Medtronic W4 TR 01 biventricular pacemaker  3. Nonischemic Cardiomyopathy, HFrecEF  a. Cardiac cath  Normal Coronaries; EF 45 %  b. TTE 2022 Dilated LA, mild MR, LV EF ~ 53%  c. Echo 2023 Dilated LA, mild MR, LVEF 55% with abnormal longitudinal strain  4. H/o embolic CVA , on AC- Coumadin  a. Recent stroke like event on 2023- CT Brain negative for acute infarct/bleed  5. Smoker 1 pack per day x 35 years  6. Legally blind  7. HTN  a. Cardiac Stress Test 3/2022 with normal BP response    INTERVAL HISTORY:     Dear  No ref. provider found    I saw Brandt Hdz today in the [facility],  [facility] and [facility] [place] at [facility] specialty clinic.  He was last seen by me 3/7/23.  He has missed several scheduled follow up appointments.     Mr. Hdz is a 54 y/o male with h/o PDA, s/p ligation. He has congenital CHB. In 2018, his PPM was upgraded to a CRT-P Medtronic W4 TR 01 BiV pacemaker. He has non ischemic cardiomyopathy, now with recovered EF.     He has a significant PMH, most notably for CVA on anticoagulation. He is a smoker with sleep apnea with chronic EUCEDA.      At his visit in , Mr. Hdz complained of SOB with light activity around house, climbing one flight of stairs. We felt he was volume overloaded with CVP around 16 mmHg, and started Lasix 20 mg a day which has been continued. He lost about 20 pounds but does not believe his EUCEDA has improved. No orthopnea, rare  palpitations with anxiety, no edema, believes his waist is smaller. He can climb a flight of stairs where he lives without stopping.     On 2/28/2023, patient was brought to the ER by EMS for stroke alert. It was evening time and he lost partial vision and had LE weakness. His mom called 911 and patient was brought to [facility]. He had left arm drift and vision loss in right eye. He underwent CT Brain with contrast which showed chronic infarct, unchanged from 2021. He was later assessed without neurological symptoms and was sent home the next day. His INR was sub-therapeutic at the time of the event (1.4). His coumadin dose has been adjusted by his PCP.     H/o of panic attacks, once weekly. He follows with psychiatry and is prescribed propanolol for anxiety.  He has chronic neuropathy on left arm and left leg with weakness post CVA.      PPM interrogated q6 months remotely. Follows with Dr. Blue.      Today he reports:.    ROS:   General: no fatigue, no fever, no weight loss, no excessive sweating, no decreased appetite  HEENT: no facial swelling, no hoarseness, no eye redness, no eye lid swelling  Cardiac: no fainting, no cyanosis, no chest pain, no palpitations  Respiratory: no shortness of breath, no coughing blood, no noisy breathing, no wheezing, no cough  GI: No abdomen pain, no constipation, no diarrhea, no vomiting  Musculoskeletal: no extremity swelling  Skin: no paleness, no rash, no yellow skin  Hematologic: no easy bruising, no easy bleeding  Neurologic: no seizures, no weakness    All systems negative unless otherwise stated.    CURRENT MEDS:    Current Outpatient Medications:     albuterol (Proventil HFA) 90 mcg/actuation inhaler, Inhale 2 puffs every 4 hours if needed for wheezing or shortness of breath., Disp: 6.7 g, Rfl: 0    buPROPion XL (Wellbutrin XL) 300 mg 24 hr tablet, Take 1 tablet (300 mg) by mouth once daily in the morning. Take before meals., Disp: 90 tablet, Rfl: 3    Eliquis 5 mg  tablet, Take 1 tablet (5 mg) by mouth 2 times a day., Disp: 180 tablet, Rfl: 1    furosemide (Lasix) 20 mg tablet, TAKE 1 TABLET BY MOUTH DAILY, Disp: 90 tablet, Rfl: 3    gabapentin (Neurontin) 400 mg capsule, Take 1 capsule (400 mg) by mouth 2 times a day., Disp: 180 capsule, Rfl: 1    lamoTRIgine (LaMICtal) 200 mg tablet, Take 1 tablet (200 mg) by mouth 2 times a day., Disp: 60 tablet, Rfl: 0    melatonin 10 mg tablet, Take 2 tablets (20 mg) by mouth as needed at bedtime., Disp: , Rfl:     metFORMIN (Glucophage) 500 mg tablet, Take 1 tablet (500 mg) by mouth once daily., Disp: 90 tablet, Rfl: 3    propranolol (Inderal) 40 mg tablet, Take 1 tablet (40 mg) by mouth 3 times a day., Disp: 90 tablet, Rfl: 2    tadalafil (Cialis) 20 mg tablet, Take 1 tablet (20 mg) by mouth once daily as needed for erectile dysfunction., Disp: 10 tablet, Rfl: 2    telmisartan (Micardis) 20 mg tablet, Take 1 tablet (20 mg) by mouth once daily., Disp: 30 tablet, Rfl: 11    Vraylar 4.5 mg capsule, 1 capsule (4.5 mg) once daily at bedtime., Disp: 90 capsule, Rfl: 3    PHYSICAL EXAM:  There were no vitals taken for this visit.  There is no height or weight on file to calculate BMI.    General: Well appearing and in no distress  HEENT: Moist mucous membranes  Neck: Supple, JVP normal, Carotid upstrokes brisk bilaterally and without bruits  Respiratory: Clear to ausculation bilaterally; no wheezing/rales/rhonchi; respirations non-labored  Cardiovascular: RRR,normal PMI, normal S1 and S2. No S3 or S4. No murmurs or rubs.   Gastrointestinal: soft, non-tender, no organomegaly, no abnormal aortic pulsation  Extremities: warm and well perfused with no cyanosis, clubbing, or edema  Neurologic: awake/alert, no focal deficits    DATA:    Echo 1/24/2023 images reviewed and interpreted by me  Left atrial enlargement  Mild MR  Device seen in Rt sided chambers  Normal LV size and function, abnormal global LV strain -17  Biplane LVEF 55%     Cardiac  "Stress Test 3/2022  1. Submaximal stress test.  2. Appropriate HR and blood pressure response.  3. The submaximal level of stress was achieved     Echo 1/25/2022   h/o PDA ligation  Mildly diminished LV function, LVEF ~53 % (improved from previous ECHO 2016)  Severely Dilated LA  Mild MR  PPM lead in RV with trivial TR, unable to measure RVSP  Elevated filling pressures, most likely diastolic dysfunction        Echo 2016    1. The left ventricular systolic function is mildly to moderately decreased.   2. There is moderate asymmetric left ventricular hypertrophy.   3. The left atrium is severely dilated.   4. RVSP within normal limits.   5. The calculated ejection fraction is mild to moderately decreased at 39 % using the Nelson's Bi-plane MOD calculation.     Cardiac Cath 2016  Dr. Perea  Normal Coronaries  LV EF 45%     WBC   Date Value Ref Range Status   02/26/2023 9.9 4.4 - 11.3 x10E9/L Final     Hemoglobin   Date Value Ref Range Status   02/26/2023 14.9 13.5 - 17.5 g/dL Final     MCV   Date Value Ref Range Status   02/26/2023 89 80 - 100 fL Final     Creatinine   Date Value Ref Range Status   02/26/2023 1.14 0.50 - 1.30 mg/dL Final   03/07/2022 1.03 0.50 - 1.30 mg/dL Final       No components found for: \"MAGNESIUM\"  Total Protein   Date Value Ref Range Status   02/26/2023 7.5 6.4 - 8.2 g/dL Final     Albumin   Date Value Ref Range Status   02/26/2023 4.8 3.4 - 5.0 g/dL Final     ALT (SGPT)   Date Value Ref Range Status   02/26/2023 12 10 - 52 U/L Final     Comment:      Patients treated with Sulfasalazine may generate    falsely decreased results for ALT.       AST   Date Value Ref Range Status   02/26/2023 17 9 - 39 U/L Final     INR   Date Value Ref Range Status   04/14/2023 1.5 (H) 0.9 - 1.1 Final     aPTT   Date Value Ref Range Status   02/26/2023 37 26 - 39 sec Final     Comment:       THE APTT IS NO LONGER USED FOR MONITORING     UNFRACTIONATED HEPARIN THERAPY.    FOR MONITORING HEPARIN THERAPY,     " USE THE HEPARIN ASSAY.           ASSESSMENT & PLAN:      1. PDA  s/p ligation via left thoracotomy with no residual shunt      Echo 1/2023 no residual shunt, this does not require any future imaging     2. Congenital CHB  PPM with upgrade to CRT in 2018  Follows with Dr. Blue     3. HFrecEF  TTE 2016 EF 45%  TTE 1/25/2022 EF ~ 53% with evidence of elevated filling pressures  Echo 1/24/2023 LV EF 55%, abnormal LV strain  NYHA II-III     Symptoms of EUCEDA, he is now euvolemic with no improvement in symptoms. This may be pulmonary (chronic smoker 35 pack year)  Improved EF after CRT therapy  chronic diastolic HF  ON AC for h/o stroke     CVP ~ 5-10mmHG today and euvolemic on Lasix 20 mg daily - continue this dose  Weight checks daily     Telmisartan 20 mg daily, continue  Consider SGLT2i ***     4. HTN  His SBP today is ***. He underwent exercise stress test in March 2022 which showed a normal BP response to exercise.     Continue Telmisartan 20 mg daily     5. h/o Stroke  Right MCA and right occipital stroke 2021, ? secondary to PAF, unknown last incidence of afib     Event 2/26/2023 with LE weakness and vision loss with suspicion for stroke. CT Brain negative for acute event.      Patient is on Coumadin, managed by PCP    Thank you for allowing me to participate in the care of this patient.  Please don't hesitate to contact us with any questions or concerns.    Sincerely,    Isidro Renner MD, MSc  Director, Adult Congenital Heart Disease Program  [facility] [place]

## 2024-07-09 ENCOUNTER — APPOINTMENT (OUTPATIENT)
Dept: PEDIATRIC CARDIOLOGY | Facility: CLINIC | Age: 54
End: 2024-07-09
Payer: MEDICARE

## 2024-07-09 NOTE — PROGRESS NOTES
"Subjective   Patient ID: Brandt Hdz is a 53 y.o. male who presents for Depression, Diabetes, and Hypertension.  HPI  Depressed mood ongoing  See counselor  Report suicidal ideation report psychotic or manic symptoms  Adjust medicine    Seizure disorder stable no recent seizures    Positive BPH might benefit from Flomax  Stay current with PSA testing as well    Patient going through disability determination  Review of Systems   Constitutional:  Negative for activity change and fatigue.   HENT:  Negative for congestion and sore throat.    Eyes:  Negative for discharge.   Respiratory:  Negative for cough, chest tightness and shortness of breath.    Cardiovascular:  Negative for chest pain and leg swelling.   Gastrointestinal:  Negative for abdominal pain, blood in stool, constipation, diarrhea, nausea and vomiting.   Endocrine: Negative for cold intolerance and heat intolerance.   Genitourinary:  Negative for difficulty urinating and hematuria.   Musculoskeletal:  Negative for arthralgias, back pain, gait problem, myalgias and neck pain.   Allergic/Immunologic: Negative for environmental allergies.   Neurological:  Negative for dizziness, syncope, weakness, numbness and headaches.   Hematological:  Negative for adenopathy. Does not bruise/bleed easily.   Psychiatric/Behavioral:  Negative for dysphoric mood. The patient is not nervous/anxious.    All other systems reviewed and are negative.      Objective   /80 (BP Location: Left arm, BP Cuff Size: Adult)   Pulse 88   Ht 1.676 m (5' 6\")   Wt 79.6 kg (175 lb 6.4 oz)   SpO2 96%   BMI 28.31 kg/m²    Physical Exam  Vitals and nursing note reviewed.   Constitutional:       General: He is not in acute distress.     Appearance: Normal appearance.   HENT:      Head: Normocephalic and atraumatic.      Right Ear: Tympanic membrane, ear canal and external ear normal.      Left Ear: Tympanic membrane, ear canal and external ear normal.      Nose: Nose normal.      " Mouth/Throat:      Mouth: Mucous membranes are moist.      Pharynx: Oropharynx is clear. No oropharyngeal exudate or posterior oropharyngeal erythema.   Eyes:      Extraocular Movements: Extraocular movements intact.      Conjunctiva/sclera: Conjunctivae normal.      Pupils: Pupils are equal, round, and reactive to light.   Cardiovascular:      Rate and Rhythm: Normal rate and regular rhythm.      Pulses: Normal pulses.      Heart sounds: Normal heart sounds. No murmur heard.  Pulmonary:      Effort: Pulmonary effort is normal. No respiratory distress.      Breath sounds: Normal breath sounds. No wheezing or rales.   Abdominal:      General: Abdomen is flat. Bowel sounds are normal. There is no distension.      Palpations: Abdomen is soft. There is no mass.      Tenderness: There is no abdominal tenderness.   Musculoskeletal:         General: No swelling or deformity. Normal range of motion.      Cervical back: Normal range of motion and neck supple.      Right lower leg: No edema.      Left lower leg: No edema.   Lymphadenopathy:      Cervical: No cervical adenopathy.   Skin:     General: Skin is warm and dry.      Capillary Refill: Capillary refill takes less than 2 seconds.      Findings: No lesion or rash.   Neurological:      General: No focal deficit present.      Mental Status: He is alert and oriented to person, place, and time.      Cranial Nerves: No cranial nerve deficit.      Motor: No weakness.   Psychiatric:         Mood and Affect: Mood normal.         Behavior: Behavior normal.         Thought Content: Thought content normal.         Judgment: Judgment normal.         Assessment/Plan   Problem List Items Addressed This Visit       Major depressive disorder, recurrent severe without psychotic features (Multi) - Primary    Focal epilepsy (Multi)    Relevant Medications    lamoTRIgine (LaMICtal) 200 mg tablet     Other Visit Diagnoses       Benign prostatic hyperplasia with urinary frequency         Relevant Medications    tamsulosin (Flomax) 0.4 mg 24 hr capsule            Patient education provided.  Stay current with age appropriate health maintenance as instructed.  Appointment here or ER with new or worsening symptoms'  Keep appropriate follow-up visit.  Stay current with proper immunizations   Report suicidal ideation  Report psychotic or manic symptoms  3-month recheck  Medicine adjustment as above

## 2024-07-19 ENCOUNTER — TELEPHONE (OUTPATIENT)
Dept: PEDIATRIC CARDIOLOGY | Facility: HOSPITAL | Age: 54
End: 2024-07-19
Payer: MEDICARE

## 2024-07-19 NOTE — TELEPHONE ENCOUNTER
07/19/24 at 2:40 PM     Spoke with: Patient   [phone]     Confirmed upcoming appointment as follows:  Provider: Dr. Renner  Location:  [place]  Date: 7/23  Time: Appointment: 11:30    Reviewed medications and allergies.   Patient confirmed understanding of appointment details, no further questions or issues to be addressed. Encouraged reaching out if there was anything else needed.        - Sharonda Espinoza RN  [facility] Patient Navigator  [phone]

## 2024-07-23 ENCOUNTER — APPOINTMENT (OUTPATIENT)
Dept: PEDIATRIC CARDIOLOGY | Facility: CLINIC | Age: 54
End: 2024-07-23
Payer: MEDICARE

## 2024-07-23 DIAGNOSIS — Q25.0 PDA (PATENT DUCTUS ARTERIOSUS) (HHS-HCC): Primary | ICD-10-CM

## 2024-07-23 DIAGNOSIS — I42.9 CARDIOMYOPATHY, UNSPECIFIED TYPE (MULTI): ICD-10-CM

## 2024-07-23 DIAGNOSIS — I10 HYPERTENSION, UNSPECIFIED TYPE: ICD-10-CM

## 2024-07-25 ENCOUNTER — APPOINTMENT (OUTPATIENT)
Dept: PRIMARY CARE | Facility: CLINIC | Age: 54
End: 2024-07-25
Payer: MEDICARE

## 2024-07-25 VITALS
HEIGHT: 66 IN | BODY MASS INDEX: 28.19 KG/M2 | DIASTOLIC BLOOD PRESSURE: 80 MMHG | SYSTOLIC BLOOD PRESSURE: 148 MMHG | OXYGEN SATURATION: 96 % | WEIGHT: 175.4 LBS | HEART RATE: 88 BPM

## 2024-07-25 DIAGNOSIS — G40.109 FOCAL EPILEPSY (MULTI): ICD-10-CM

## 2024-07-25 DIAGNOSIS — N40.1 BENIGN PROSTATIC HYPERPLASIA WITH URINARY FREQUENCY: ICD-10-CM

## 2024-07-25 DIAGNOSIS — R35.0 BENIGN PROSTATIC HYPERPLASIA WITH URINARY FREQUENCY: ICD-10-CM

## 2024-07-25 DIAGNOSIS — F33.2 MAJOR DEPRESSIVE DISORDER, RECURRENT SEVERE WITHOUT PSYCHOTIC FEATURES (MULTI): Primary | ICD-10-CM

## 2024-07-25 PROCEDURE — 99214 OFFICE O/P EST MOD 30 MIN: CPT | Performed by: FAMILY MEDICINE

## 2024-07-25 PROCEDURE — 3077F SYST BP >= 140 MM HG: CPT | Performed by: FAMILY MEDICINE

## 2024-07-25 PROCEDURE — 4010F ACE/ARB THERAPY RXD/TAKEN: CPT | Performed by: FAMILY MEDICINE

## 2024-07-25 PROCEDURE — 3008F BODY MASS INDEX DOCD: CPT | Performed by: FAMILY MEDICINE

## 2024-07-25 PROCEDURE — 3079F DIAST BP 80-89 MM HG: CPT | Performed by: FAMILY MEDICINE

## 2024-07-25 RX ORDER — LAMOTRIGINE 200 MG/1
200 TABLET ORAL 2 TIMES DAILY
Qty: 60 TABLET | Refills: 2 | Status: SHIPPED | OUTPATIENT
Start: 2024-07-25 | End: 2024-10-23

## 2024-07-25 RX ORDER — TAMSULOSIN HYDROCHLORIDE 0.4 MG/1
0.4 CAPSULE ORAL DAILY
Qty: 30 CAPSULE | Refills: 11 | Status: SHIPPED | OUTPATIENT
Start: 2024-07-25 | End: 2025-07-25

## 2024-07-25 ASSESSMENT — ENCOUNTER SYMPTOMS
DIARRHEA: 0
WEAKNESS: 0
BACK PAIN: 0
NUMBNESS: 0
ADENOPATHY: 0
ABDOMINAL PAIN: 0
NERVOUS/ANXIOUS: 0
MYALGIAS: 0
VOMITING: 0
BRUISES/BLEEDS EASILY: 0
DIZZINESS: 0
NAUSEA: 0
ARTHRALGIAS: 0
NECK PAIN: 0
BLOOD IN STOOL: 0
CONSTIPATION: 0
ACTIVITY CHANGE: 0
HEADACHES: 0
FATIGUE: 0
HEMATURIA: 0
CHEST TIGHTNESS: 0
SORE THROAT: 0
EYE DISCHARGE: 0
SHORTNESS OF BREATH: 0
DYSPHORIC MOOD: 0
COUGH: 0
DIFFICULTY URINATING: 0

## 2024-07-26 DIAGNOSIS — J20.8 ACUTE BRONCHITIS DUE TO OTHER SPECIFIED ORGANISMS: Primary | ICD-10-CM

## 2024-07-26 RX ORDER — ALBUTEROL SULFATE 90 UG/1
2 AEROSOL, METERED RESPIRATORY (INHALATION) EVERY 4 HOURS PRN
Qty: 18 G | Refills: 0 | Status: SHIPPED | OUTPATIENT
Start: 2024-07-26 | End: 2024-10-24

## 2024-07-26 NOTE — TELEPHONE ENCOUNTER
Rx Refill Request     Name: Brandt Hdz  :  1970   Medication Name:    albuterol (Proventil HFA) 90 mcg/actuation inhaler    Last fill: 2024 17 day supply Q 18 g R 0  Specific Pharmacy location:  [facility]  Date of last appointment:  2024  Date of next appointment:  2024  Best number to reach patient:  [phone]       RX PENDED to [facility] as directed by fax

## 2024-07-29 PROBLEM — I50.32 HEART FAILURE WITH RECOVERED EJECTION FRACTION (HFRECEF) (MULTI): Status: ACTIVE | Noted: 2024-07-29

## 2024-07-29 NOTE — PROGRESS NOTES
Patient Name: Brandt Hdz   Provider: Isidro Renner MD  : 1970  Date of Service: 2024    DIAGNOSES:     1. PDA  a. 1976 s/p PDA ligation via left thoracotomy (Psychiatric)  2. Congenital CHB s/p Initial PPM  ()  a.  PPM implant (Dr. Joiner)   b. Abandoned RV lead from , active RA lead from   c. 2018 Status post upgrade to CRT-P 2018.Medtronic W4 TR 01 biventricular pacemaker  3. Nonischemic Cardiomyopathy, HFrecEF  a. Cardiac cath  Normal Coronaries; EF 45 %  b. TTE 2022 Dilated LA, mild MR, LV EF ~ 53%  c. Echo 2023 Dilated LA, mild MR, LVEF 55% with abnormal longitudinal strain  4. H/o embolic CVA , on AC- Coumadin  a. Recent stroke like event on 2023- CT Brain negative for acute infarct/bleed  5. Smoker 1 pack per day x 35 years  6. Legally blind  7. HTN  a. Cardiac Stress Test 3/2022 with normal BP response    INTERVAL HISTORY:     I saw Brandt Hdz today in the [facility],  [facility] [place] at [facility].  He was last seen by me 3/7/23.  He has missed several scheduled follow up appointments.     Mr. Hdz is a 54 y/o male with h/o PDA, s/p ligation. He has congenital CHB. In 2018, his PPM was upgraded to a CRT-P Medtronic W4 TR 01 BiV pacemaker. He has non ischemic cardiomyopathy, now with recovered EF.     He has a significant PMH, most notably for CVA on anticoagulation. He is a smoker with sleep apnea with chronic EUCEDA.      At his visit in , Mr. Hdz complained of SOB with light activity around house, climbing one flight of stairs. We felt he was volume overloaded with CVP around 16 mmHg, and started Lasix 20 mg a day which has been continued. He lost about 20 pounds but does not believe his EUCEDA has improved. No orthopnea, rare palpitations with anxiety, no edema, believes his waist is smaller. He can  climb a flight of stairs where he lives without stopping.     On 2/28/2023, patient was brought to the ER by EMS for stroke alert. It was evening time and he lost partial vision and had LE weakness. His mom called 911 and patient was brought to [facility]. He had left arm drift and vision loss in right eye. He underwent CT Brain with contrast which showed chronic infarct, unchanged from 2021. He was later assessed without neurological symptoms and was sent home the next day. His INR was sub-therapeutic at the time of the event (1.4). His coumadin dose has been adjusted by his PCP.     H/o of panic attacks, once weekly. He follows with psychiatry and is prescribed propanolol for anxiety.  He has chronic neuropathy on left arm and left leg with weakness post CVA.      PPM interrogated q6 months remotely. Follows with Dr. Blue.      Today he reports he has ongooing dyspnea with all exertion  Perhaps worse than previously reported.  No edema.  Some dizziness at times, particularly with bending.      ROS:   General: no fatigue, no fever, no weight loss, no excessive sweating, no decreased appetite  HEENT: no facial swelling, no hoarseness, no eye redness, no eye lid swelling  Cardiac: no fainting, no cyanosis, no chest pain, no palpitations  Respiratory: + shortness of breath, no coughing blood, no noisy breathing, no wheezing, no cough  GI: No abdomen pain, no constipation, no diarrhea, no vomiting  Musculoskeletal: no extremity swelling  Skin: no paleness, no rash, no yellow skin  Hematologic: no easy bruising, no easy bleeding  Neurologic: no seizures, no weakness    All systems negative unless otherwise stated.    CURRENT MEDS:    Current Outpatient Medications:     albuterol (Proventil HFA) 90 mcg/actuation inhaler, Inhale 2 puffs every 4 hours if needed for wheezing or shortness of breath., Disp: 18 g, Rfl: 0    buPROPion XL (Wellbutrin XL) 300 mg 24 hr tablet, Take 1 tablet (300 mg) by mouth once daily in the  "morning. Take before meals., Disp: 90 tablet, Rfl: 3    Eliquis 5 mg tablet, Take 1 tablet (5 mg) by mouth 2 times a day., Disp: 180 tablet, Rfl: 1    furosemide (Lasix) 20 mg tablet, TAKE 1 TABLET BY MOUTH DAILY, Disp: 90 tablet, Rfl: 3    gabapentin (Neurontin) 400 mg capsule, Take 1 capsule (400 mg) by mouth 2 times a day., Disp: 180 capsule, Rfl: 1    lamoTRIgine (LaMICtal) 200 mg tablet, Take 1 tablet (200 mg) by mouth 2 times a day., Disp: 60 tablet, Rfl: 2    melatonin 10 mg tablet, Take 2 tablets (20 mg) by mouth as needed at bedtime., Disp: , Rfl:     metFORMIN (Glucophage) 500 mg tablet, Take 1 tablet (500 mg) by mouth once daily., Disp: 90 tablet, Rfl: 3    propranolol (Inderal) 40 mg tablet, Take 1 tablet (40 mg) by mouth 3 times a day., Disp: 90 tablet, Rfl: 2    tadalafil (Cialis) 20 mg tablet, Take 1 tablet (20 mg) by mouth once daily as needed for erectile dysfunction., Disp: 10 tablet, Rfl: 2    tamsulosin (Flomax) 0.4 mg 24 hr capsule, Take 1 capsule (0.4 mg) by mouth once daily., Disp: 30 capsule, Rfl: 11    telmisartan (Micardis) 20 mg tablet, Take 1 tablet (20 mg) by mouth once daily., Disp: 30 tablet, Rfl: 11    Vraylar 4.5 mg capsule, 1 capsule (4.5 mg) once daily at bedtime., Disp: 90 capsule, Rfl: 3    PHYSICAL EXAM:  /75 (BP Location: Right arm, Patient Position: Sitting)   Pulse 82   Temp 36.3 °C (97.3 °F)   Ht 1.668 m (5' 5.67\")   Wt 78.5 kg (173 lb 1 oz)   SpO2 95%   BMI 28.22 kg/m²   Body mass index is 28.22 kg/m².    General: Well appearing and in no distress  HEENT: Moist mucous membranes  Neck: Supple, JVP normal, Carotid upstrokes brisk bilaterally and without bruits  Respiratory: Clear to ausculation bilaterally; no wheezing/rales/rhonchi; respirations non-labored  Cardiovascular: RRR,normal PMI, normal S1 and S2. No S3 or S4. No murmurs or rubs.   Gastrointestinal: soft, non-tender, no organomegaly, no abnormal aortic pulsation  Extremities: warm and well perfused with " "no cyanosis, clubbing, or edema  Neurologic: awake/alert, no focal deficits    DATA:    Echo 1/24/2023 images reviewed and interpreted by me  Left atrial enlargement  Mild MR  Device seen in Rt sided chambers  Normal LV size and function, abnormal global LV strain -17  Biplane LVEF 55%     Cardiac Stress Test 3/2022  1. Submaximal stress test.  2. Appropriate HR and blood pressure response.  3. The submaximal level of stress was achieved     Echo 1/25/2022   h/o PDA ligation  Mildly diminished LV function, LVEF ~53 % (improved from previous ECHO 2016)  Severely Dilated LA  Mild MR  PPM lead in RV with trivial TR, unable to measure RVSP  Elevated filling pressures, most likely diastolic dysfunction        Echo 2016    1. The left ventricular systolic function is mildly to moderately decreased.   2. There is moderate asymmetric left ventricular hypertrophy.   3. The left atrium is severely dilated.   4. RVSP within normal limits.   5. The calculated ejection fraction is mild to moderately decreased at 39 % using the Nelson's Bi-plane MOD calculation.     Cardiac Cath 2016  Dr. Perea  Normal Coronaries  LV EF 45%     WBC   Date Value Ref Range Status   02/26/2023 9.9 4.4 - 11.3 x10E9/L Final     Hemoglobin   Date Value Ref Range Status   02/26/2023 14.9 13.5 - 17.5 g/dL Final     MCV   Date Value Ref Range Status   02/26/2023 89 80 - 100 fL Final     Creatinine   Date Value Ref Range Status   02/26/2023 1.14 0.50 - 1.30 mg/dL Final   03/07/2022 1.03 0.50 - 1.30 mg/dL Final       No components found for: \"MAGNESIUM\"  Total Protein   Date Value Ref Range Status   02/26/2023 7.5 6.4 - 8.2 g/dL Final     Albumin   Date Value Ref Range Status   02/26/2023 4.8 3.4 - 5.0 g/dL Final     ALT (SGPT)   Date Value Ref Range Status   02/26/2023 12 10 - 52 U/L Final     Comment:      Patients treated with Sulfasalazine may generate    falsely decreased results for ALT.       AST   Date Value Ref Range Status   02/26/2023 17 9 " - 39 U/L Final     INR   Date Value Ref Range Status   04/14/2023 1.5 (H) 0.9 - 1.1 Final     aPTT   Date Value Ref Range Status   02/26/2023 37 26 - 39 sec Final     Comment:       THE APTT IS NO LONGER USED FOR MONITORING     UNFRACTIONATED HEPARIN THERAPY.    FOR MONITORING HEPARIN THERAPY,     USE THE HEPARIN ASSAY.           ASSESSMENT & PLAN:      1. PDA  s/p ligation via left thoracotomy with no residual shunt      Echo 1/2023 no residual shunt, this does not require any future imaging     2. Congenital CHB  PPM with upgrade to CRT in 2018  Follows with Dr. Blue     3. HFrecEF  TTE 2016 EF 45%  TTE 1/25/2022 EF ~ 53% with evidence of elevated filling pressures  Echo 1/24/2023 LV EF 55%, abnormal LV strain  NYHA II-III     Symptoms of EUCEDA, he is now euvolemic with no improvement in symptoms. This may be pulmonary (chronic smoker 35 pack year)  Improved EF after CRT therapy  chronic diastolic HF  ON AC for h/o stroke     CVP ~ 10-15 mmHG today and no edema on Lasix 20 mg daily - continue this dose     Telmisartan 20 mg daily, continue  He would benefit from SLGT2i - ordered Jardiance 10 mg po every day.  To discuss with PCP since he is also on metformin.  Labs in 2 weeks after starting.  RTC 2 months to discuss efficacy.       4. HTN  His SBP today is 130.  He underwent exercise stress test in March 2022 which showed a normal BP response to exercise.     Continue Telmisartan 20 mg daily     5. h/o Stroke  Right MCA and right occipital stroke 2021, ? secondary to PAF, unknown last incidence of afib     Event 2/26/2023 with LE weakness and vision loss with suspicion for stroke. CT Brain negative for acute event.      Patient is on Eliquis, managed by PCP    Thank you for allowing me to participate in the care of this patient.  Please don't hesitate to contact us with any questions or concerns.    Sincerely,    Isidro Renner MD, MSc  Director, Adult Congenital Heart Disease Program  [facility] [place]

## 2024-08-12 ENCOUNTER — TELEPHONE (OUTPATIENT)
Dept: PEDIATRIC CARDIOLOGY | Facility: HOSPITAL | Age: 54
End: 2024-08-12
Payer: COMMERCIAL

## 2024-08-12 NOTE — PATIENT INSTRUCTIONS
I think you will benefit from starting Jardiance for your heart.  10 mg once a day.  Please get blood work 2 weeks after starting.  Next visit 2 months to see how you are doing.  Please also discuss the combination of jardiance and metformin with your primary care.      Follow up with: Isidro Renner MD    Date: 10/8   Time: Appointment: 10:30   Location: [place] [address], [place], [place], [address]           [facility] program contact information:  [facility] Nurse line: [phone]  [facility] Coordinator: [phone] (scheduling)  After hours: call [phone] to page on-call pediatric cardiology fellow at [phone]  Email: [email or web address]  [employer]  **If your pharmacy has any problems requesting refills from us for your cardiac medications, please contact us.

## 2024-08-12 NOTE — TELEPHONE ENCOUNTER
08/12/24 at 3:49 PM     Attempted to reach: Patient   [phone]     Call went to voicemail, left message confirming appointment as follows:  Provider: Dr. Renner  Location:  [place]  Date: 8/13  Time: Appointment: 11:30    Asked for return call to confirm medications and allergies and provided contact info.        - Sharonda Espinoza RN  [facility] RN Patient Navigator  Pediatric Cardiology  [phone]

## 2024-08-13 ENCOUNTER — APPOINTMENT (OUTPATIENT)
Dept: PEDIATRIC CARDIOLOGY | Facility: CLINIC | Age: 54
End: 2024-08-13
Payer: COMMERCIAL

## 2024-08-13 VITALS
HEIGHT: 66 IN | TEMPERATURE: 97.3 F | BODY MASS INDEX: 27.81 KG/M2 | SYSTOLIC BLOOD PRESSURE: 130 MMHG | HEART RATE: 82 BPM | OXYGEN SATURATION: 95 % | DIASTOLIC BLOOD PRESSURE: 75 MMHG | WEIGHT: 173.06 LBS

## 2024-08-13 DIAGNOSIS — Q25.0 PDA (PATENT DUCTUS ARTERIOSUS) (HHS-HCC): Primary | ICD-10-CM

## 2024-08-13 DIAGNOSIS — I50.32 HEART FAILURE WITH RECOVERED EJECTION FRACTION (HFRECEF) (MULTI): ICD-10-CM

## 2024-08-13 PROCEDURE — 3075F SYST BP GE 130 - 139MM HG: CPT | Performed by: INTERNAL MEDICINE

## 2024-08-13 PROCEDURE — 4010F ACE/ARB THERAPY RXD/TAKEN: CPT | Performed by: INTERNAL MEDICINE

## 2024-08-13 PROCEDURE — 3078F DIAST BP <80 MM HG: CPT | Performed by: INTERNAL MEDICINE

## 2024-08-13 PROCEDURE — 3008F BODY MASS INDEX DOCD: CPT | Performed by: INTERNAL MEDICINE

## 2024-08-13 PROCEDURE — 99213 OFFICE O/P EST LOW 20 MIN: CPT | Performed by: INTERNAL MEDICINE

## 2024-08-13 NOTE — LETTER
2024     Mani Fortune MD  [address]  [facility] [address]    Patient: Brandt Hdz   YOB: 1970   Date of Visit: 2024       Dear Dr. Mani Fortune MD:    Thank you for referring Brandt Hdz to me for evaluation. Below are my notes for this consultation.  If you have questions, please do not hesitate to call me. I look forward to following your patient along with you.       Sincerely,     Isidro Renner MD      CC: No Recipients  ______________________________________________________________________________________    Patient Name: Brandt Hdz   Provider: Isidro Renner MD  : 1970  Date of Service: 2024    DIAGNOSES:     1. PDA  a.  s/p PDA ligation via left thoracotomy ([facility])  2. Congenital CHB s/p Initial PPM  ()  a.  PPM implant (Dr. Joiner)   b. Abandoned RV lead from , active RA lead from   c. 2018 Status post upgrade to CRT-P .Medtronic W4 TR 01 biventricular pacemaker  3. Nonischemic Cardiomyopathy, HFrecEF  a. Cardiac cath  Normal Coronaries; EF 45 %  b. TTE 2022 Dilated LA, mild MR, LV EF ~ 53%  c. Echo 2023 Dilated LA, mild MR, LVEF 55% with abnormal longitudinal strain  4. H/o embolic CVA , on AC- Coumadin  a. Recent stroke like event on 2023- CT Brain negative for acute infarct/bleed  5. Smoker 1 pack per day x 35 years  6. Legally blind  7. HTN  a. Cardiac Stress Test 3/2022 with normal BP response    INTERVAL HISTORY:     I saw Brandt Hdz today in [facility] [place] [facility].  He was last seen by me 3/7/23.  He has missed several scheduled follow up appointments.     Mr. Hdz is a 52 y/o male with h/o PDA, s/p ligation. He has congenital CHB. In 2018, his PPM was upgraded to a CRT-P Medtronic W4 TR 01 BiV  pacemaker. He has non ischemic cardiomyopathy, now with recovered EF.     He has a significant PMH, most notably for CVA on anticoagulation. He is a smoker with sleep apnea with chronic EUCEDA.      At his visit in 1/22, Mr. Hdz complained of SOB with light activity around house, climbing one flight of stairs. We felt he was volume overloaded with CVP around 16 mmHg, and started Lasix 20 mg a day which has been continued. He lost about 20 pounds but does not believe his EUCEDA has improved. No orthopnea, rare palpitations with anxiety, no edema, believes his waist is smaller. He can climb a flight of stairs where he lives without stopping.     On 2/28/2023, patient was brought to the ER by EMS for stroke alert. It was evening time and he lost partial vision and had LE weakness. His mom called 911 and patient was brought to [facility]. He had left arm drift and vision loss in right eye. He underwent CT Brain with contrast which showed chronic infarct, unchanged from 2021. He was later assessed without neurological symptoms and was sent home the next day. His INR was sub-therapeutic at the time of the event (1.4). His coumadin dose has been adjusted by his PCP.     H/o of panic attacks, once weekly. He follows with psychiatry and is prescribed propanolol for anxiety.  He has chronic neuropathy on left arm and left leg with weakness post CVA.      PPM interrogated q6 months remotely. Follows with Dr. Blue.      Today he reports he has ongooing dyspnea with all exertion  Perhaps worse than previously reported.  No edema.  Some dizziness at times, particularly with bending.      ROS:   General: no fatigue, no fever, no weight loss, no excessive sweating, no decreased appetite  HEENT: no facial swelling, no hoarseness, no eye redness, no eye lid swelling  Cardiac: no fainting, no cyanosis, no chest pain, no palpitations  Respiratory: + shortness of breath, no coughing blood, no noisy breathing, no wheezing, no cough  GI: No  abdomen pain, no constipation, no diarrhea, no vomiting  Musculoskeletal: no extremity swelling  Skin: no paleness, no rash, no yellow skin  Hematologic: no easy bruising, no easy bleeding  Neurologic: no seizures, no weakness    All systems negative unless otherwise stated.    CURRENT MEDS:    Current Outpatient Medications:   •  albuterol (Proventil HFA) 90 mcg/actuation inhaler, Inhale 2 puffs every 4 hours if needed for wheezing or shortness of breath., Disp: 18 g, Rfl: 0  •  buPROPion XL (Wellbutrin XL) 300 mg 24 hr tablet, Take 1 tablet (300 mg) by mouth once daily in the morning. Take before meals., Disp: 90 tablet, Rfl: 3  •  Eliquis 5 mg tablet, Take 1 tablet (5 mg) by mouth 2 times a day., Disp: 180 tablet, Rfl: 1  •  furosemide (Lasix) 20 mg tablet, TAKE 1 TABLET BY MOUTH DAILY, Disp: 90 tablet, Rfl: 3  •  gabapentin (Neurontin) 400 mg capsule, Take 1 capsule (400 mg) by mouth 2 times a day., Disp: 180 capsule, Rfl: 1  •  lamoTRIgine (LaMICtal) 200 mg tablet, Take 1 tablet (200 mg) by mouth 2 times a day., Disp: 60 tablet, Rfl: 2  •  melatonin 10 mg tablet, Take 2 tablets (20 mg) by mouth as needed at bedtime., Disp: , Rfl:   •  metFORMIN (Glucophage) 500 mg tablet, Take 1 tablet (500 mg) by mouth once daily., Disp: 90 tablet, Rfl: 3  •  propranolol (Inderal) 40 mg tablet, Take 1 tablet (40 mg) by mouth 3 times a day., Disp: 90 tablet, Rfl: 2  •  tadalafil (Cialis) 20 mg tablet, Take 1 tablet (20 mg) by mouth once daily as needed for erectile dysfunction., Disp: 10 tablet, Rfl: 2  •  tamsulosin (Flomax) 0.4 mg 24 hr capsule, Take 1 capsule (0.4 mg) by mouth once daily., Disp: 30 capsule, Rfl: 11  •  telmisartan (Micardis) 20 mg tablet, Take 1 tablet (20 mg) by mouth once daily., Disp: 30 tablet, Rfl: 11  •  Vraylar 4.5 mg capsule, 1 capsule (4.5 mg) once daily at bedtime., Disp: 90 capsule, Rfl: 3    PHYSICAL EXAM:  /75 (BP Location: Right arm, Patient Position: Sitting)   Pulse 82   Temp 36.3 °C  "(97.3 °F)   Ht 1.668 m (5' 5.67\")   Wt 78.5 kg (173 lb 1 oz)   SpO2 95%   BMI 28.22 kg/m²   Body mass index is 28.22 kg/m².    General: Well appearing and in no distress  HEENT: Moist mucous membranes  Neck: Supple, JVP normal, Carotid upstrokes brisk bilaterally and without bruits  Respiratory: Clear to ausculation bilaterally; no wheezing/rales/rhonchi; respirations non-labored  Cardiovascular: RRR,normal PMI, normal S1 and S2. No S3 or S4. No murmurs or rubs.   Gastrointestinal: soft, non-tender, no organomegaly, no abnormal aortic pulsation  Extremities: warm and well perfused with no cyanosis, clubbing, or edema  Neurologic: awake/alert, no focal deficits    DATA:    Echo 1/24/2023 images reviewed and interpreted by me  Left atrial enlargement  Mild MR  Device seen in Rt sided chambers  Normal LV size and function, abnormal global LV strain -17  Biplane LVEF 55%     Cardiac Stress Test 3/2022  1. Submaximal stress test.  2. Appropriate HR and blood pressure response.  3. The submaximal level of stress was achieved     Echo 1/25/2022   h/o PDA ligation  Mildly diminished LV function, LVEF ~53 % (improved from previous ECHO 2016)  Severely Dilated LA  Mild MR  PPM lead in RV with trivial TR, unable to measure RVSP  Elevated filling pressures, most likely diastolic dysfunction        Echo 2016    1. The left ventricular systolic function is mildly to moderately decreased.   2. There is moderate asymmetric left ventricular hypertrophy.   3. The left atrium is severely dilated.   4. RVSP within normal limits.   5. The calculated ejection fraction is mild to moderately decreased at 39 % using the Nelson's Bi-plane MOD calculation.     Cardiac Cath 2016  Dr. Perea  Normal Coronaries  LV EF 45%     WBC   Date Value Ref Range Status   02/26/2023 9.9 4.4 - 11.3 x10E9/L Final     Hemoglobin   Date Value Ref Range Status   02/26/2023 14.9 13.5 - 17.5 g/dL Final     MCV   Date Value Ref Range Status   02/26/2023 " "89 80 - 100 fL Final     Creatinine   Date Value Ref Range Status   02/26/2023 1.14 0.50 - 1.30 mg/dL Final   03/07/2022 1.03 0.50 - 1.30 mg/dL Final       No components found for: \"MAGNESIUM\"  Total Protein   Date Value Ref Range Status   02/26/2023 7.5 6.4 - 8.2 g/dL Final     Albumin   Date Value Ref Range Status   02/26/2023 4.8 3.4 - 5.0 g/dL Final     ALT (SGPT)   Date Value Ref Range Status   02/26/2023 12 10 - 52 U/L Final     Comment:      Patients treated with Sulfasalazine may generate    falsely decreased results for ALT.       AST   Date Value Ref Range Status   02/26/2023 17 9 - 39 U/L Final     INR   Date Value Ref Range Status   04/14/2023 1.5 (H) 0.9 - 1.1 Final     aPTT   Date Value Ref Range Status   02/26/2023 37 26 - 39 sec Final     Comment:       THE APTT IS NO LONGER USED FOR MONITORING     UNFRACTIONATED HEPARIN THERAPY.    FOR MONITORING HEPARIN THERAPY,     USE THE HEPARIN ASSAY.           ASSESSMENT & PLAN:      1. PDA  s/p ligation via left thoracotomy with no residual shunt      Echo 1/2023 no residual shunt, this does not require any future imaging     2. Congenital CHB  PPM with upgrade to CRT in 2018  Follows with Dr. Blue     3. HFrecEF  TTE 2016 EF 45%  TTE 1/25/2022 EF ~ 53% with evidence of elevated filling pressures  Echo 1/24/2023 LV EF 55%, abnormal LV strain  NYHA II-III     Symptoms of EUCEDA, he is now euvolemic with no improvement in symptoms. This may be pulmonary (chronic smoker 35 pack year)  Improved EF after CRT therapy  chronic diastolic HF  ON AC for h/o stroke     CVP ~ 10-15 mmHG today and no edema on Lasix 20 mg daily - continue this dose     Telmisartan 20 mg daily, continue  He would benefit from SLGT2i - ordered Jardiance 10 mg po every day.  To discuss with PCP since he is also on metformin.  Labs in 2 weeks after starting.  RTC 2 months to discuss efficacy.       4. HTN  His SBP today is 130.  He underwent exercise stress test in March 2022 which showed a " normal BP response to exercise.     Continue Telmisartan 20 mg daily     5. h/o Stroke  Right MCA and right occipital stroke 2021, ? secondary to PAF, unknown last incidence of afib     Event 2/26/2023 with LE weakness and vision loss with suspicion for stroke. CT Brain negative for acute event.      Patient is on Eliquis, managed by PCP    Thank you for allowing me to participate in the care of this patient.  Please don't hesitate to contact us with any questions or concerns.    Sincerely,    Isidro Renner MD, MSc  Director, Adult Congenital Heart Disease Program  [facility] [place]

## 2024-08-15 ENCOUNTER — APPOINTMENT (OUTPATIENT)
Dept: CARDIOLOGY | Facility: CLINIC | Age: 54
End: 2024-08-15
Payer: MEDICARE

## 2024-08-15 ENCOUNTER — HOSPITAL ENCOUNTER (OUTPATIENT)
Dept: CARDIOLOGY | Facility: HOSPITAL | Age: 54
Discharge: HOME | End: 2024-08-15
Payer: MEDICARE

## 2024-08-15 VITALS
HEIGHT: 66 IN | DIASTOLIC BLOOD PRESSURE: 86 MMHG | SYSTOLIC BLOOD PRESSURE: 130 MMHG | WEIGHT: 175 LBS | BODY MASS INDEX: 28.12 KG/M2 | HEART RATE: 67 BPM

## 2024-08-15 DIAGNOSIS — I10 PRIMARY HYPERTENSION: ICD-10-CM

## 2024-08-15 DIAGNOSIS — R06.09 DYSPNEA ON EXERTION: ICD-10-CM

## 2024-08-15 DIAGNOSIS — I49.5 SINUS NODE DYSFUNCTION (MULTI): ICD-10-CM

## 2024-08-15 DIAGNOSIS — Z95.0 CARDIAC PACEMAKER: ICD-10-CM

## 2024-08-15 DIAGNOSIS — Q24.9 CHD (CONGENITAL HEART DISEASE) (HHS-HCC): ICD-10-CM

## 2024-08-15 DIAGNOSIS — I50.9 CONGENITAL HEART FAILURE (MULTI): ICD-10-CM

## 2024-08-15 DIAGNOSIS — Z79.899 HIGH RISK MEDICATION USE: Primary | ICD-10-CM

## 2024-08-15 DIAGNOSIS — I48.0 PAROXYSMAL ATRIAL FIBRILLATION (MULTI): ICD-10-CM

## 2024-08-15 DIAGNOSIS — I51.9 LEFT VENTRICULAR SYSTOLIC DYSFUNCTION (LVSD): ICD-10-CM

## 2024-08-15 DIAGNOSIS — I44.2 ATRIOVENTRICULAR BLOCK, COMPLETE (MULTI): ICD-10-CM

## 2024-08-15 DIAGNOSIS — Q24.6 CONGENITAL HEART BLOCK (HHS-HCC): ICD-10-CM

## 2024-08-15 PROCEDURE — 4010F ACE/ARB THERAPY RXD/TAKEN: CPT | Performed by: NURSE PRACTITIONER

## 2024-08-15 PROCEDURE — 3075F SYST BP GE 130 - 139MM HG: CPT | Performed by: NURSE PRACTITIONER

## 2024-08-15 PROCEDURE — 93000 ELECTROCARDIOGRAM COMPLETE: CPT | Mod: DISTINCT PROCEDURAL SERVICE | Performed by: INTERNAL MEDICINE

## 2024-08-15 PROCEDURE — 3079F DIAST BP 80-89 MM HG: CPT | Performed by: NURSE PRACTITIONER

## 2024-08-15 PROCEDURE — 99214 OFFICE O/P EST MOD 30 MIN: CPT | Performed by: NURSE PRACTITIONER

## 2024-08-15 PROCEDURE — 3008F BODY MASS INDEX DOCD: CPT | Performed by: NURSE PRACTITIONER

## 2024-08-15 PROCEDURE — 93281 PM DEVICE PROGR EVAL MULTI: CPT

## 2024-08-15 NOTE — PROGRESS NOTES
"CARDIOLOGY OFFICE VISIT      CHIEF COMPLAINT  Chief Complaint   Patient presents with    Follow-up     Pt is here today following up after 6 months with device check      Chief complaint: \"I would like to schedule my pacemaker change now.\"  HISTORY OF PRESENT ILLNESS  HPI  History: The patient is a 53-year-old  male who is followed for congenital complete heart block status post PDA ligation in 1976 and pacemaker implant.  The right ventricular lead from 1987 was abandoned.  He underwent upgrade to an AV biventricular pacing system on July 27, 2018 for the treatment of refractory heart failure.  He is followed for nonischemic cardiomyopathy with a left ventricular ejection fraction of 43% per 2D echocardiogram dated July 11, 2016.  Additionally has a history of embolic CVA in 2004, hypertension, ongoing tobacco use, and is legally blind.  Patient also reports panic attacks.  He states that he tripped and struck the left orbit on a cement stair and does have circumferential ecchymosis of the left orbit.  He states there are no changes to his vision in his left eye.  He experiences palpitations when he is experiencing an anxiety attack.  He denies chest pain and does rarely experience shortness of breath typically with exertion and transient dizziness and lightheadedness.  He denies abdominal distention or lower extremity edema.  The patient states that he would like to schedule the pacemaker change now as it is nearing elective replacement indicator and he is anxious regarding the battery draining quickly.  Past Medical History  Past Medical History:   Diagnosis Date    Acute bronchitis 02/08/2023    Atrioventricular block, complete (Multi) 07/12/2022    Atrioventricular block, complete    Cough 02/08/2023    Essential (primary) hypertension 05/24/2022    HTN (hypertension)    Foot pain, bilateral 02/08/2023    Hemiplegia, unspecified affecting left nondominant side (Multi) 08/20/2020    Left hemiparesis    " Hemiplegia, unspecified affecting left nondominant side (Multi) 08/20/2020    Left hemiparesis    Hemiplegia, unspecified affecting left nondominant side (Multi) 08/20/2020    Left hemiparesis    Hemiplegia, unspecified affecting left nondominant side (Multi) 08/20/2020    Left hemiparesis    History of bipolar disorder 11/01/2019    Last Assessment & Plan: Formatting of this note might be different from the original. Assessment: Stable at this time On multiple medications Follows with Psychiatry    History of sick sinus syndrome 11/01/2019    History of stroke 01/03/2020    Obstructive and reflux uropathy, unspecified 04/07/2017    Obstructive uropathy    Onychomycosis 02/08/2023    Other conditions influencing health status 08/20/2020    Stroke syndrome    Other sleep disorders 03/28/2014    Sleep paralysis    Personal history of (corrected) congenital malformations of heart and circulatory system 01/25/2022    History of congenital heart block    Personal history of colonic polyps 07/20/2020    Personal history of colonic polyps    Personal history of other endocrine, nutritional and metabolic disease 09/15/2015    History of hyperlipidemia    Personal history of other specified conditions 09/03/2020    History of memory loss    Presence of cardiac pacemaker 07/12/2022    Cardiac pacemaker    Unspecified viral hepatitis C without hepatic coma 07/21/2016    Viral hepatitis C without hepatic coma    URI (upper respiratory infection) 02/08/2023       Social History  Social History     Tobacco Use    Smoking status: Every Day     Current packs/day: 1.00     Types: Cigarettes     Passive exposure: Never    Smokeless tobacco: Former     Types: Chew   Vaping Use    Vaping status: Every Day   Substance Use Topics    Alcohol use: Never    Drug use: Never       Family History     Family History   Problem Relation Name Age of Onset    Stroke Mother      Cancer Mother      Other (cardiac disorder) Mother      Lymphoma Mother       Lung cancer Mother      Colon cancer Mother      Liver cancer Mother      Colon cancer Father      Diabetes Father      Stroke Father      Other (Other) Father      Cancer Father      Ulcerative colitis Brother          Allergies:  No Known Allergies     Outpatient Medications:  Current Outpatient Medications   Medication Instructions    albuterol (Proventil HFA) 90 mcg/actuation inhaler 2 puffs, inhalation, Every 4 hours PRN    buPROPion XL (WELLBUTRIN XL) 300 mg, oral, Daily before breakfast    Eliquis 5 mg, oral, 2 times daily    empagliflozin (JARDIANCE) 10 mg, oral, Daily    furosemide (LASIX) 20 mg, oral, Daily    gabapentin (NEURONTIN) 400 mg, oral, 2 times daily    lamoTRIgine (LAMICTAL) 200 mg, oral, 2 times daily    melatonin 20 mg, oral, Nightly PRN    metFORMIN (GLUCOPHAGE) 500 mg, oral, Daily    propranolol (INDERAL) 40 mg, oral, 3 times daily    tadalafil (CIALIS) 20 mg, oral, Daily PRN    tamsulosin (FLOMAX) 0.4 mg, oral, Daily    telmisartan (MICARDIS) 20 mg, oral, Daily    Vraylar 4.5 mg capsule 1 capsule (4.5 mg) once daily at bedtime.          REVIEW OF SYSTEMS  Review of Systems   All other systems reviewed and are negative.        VITALS  Vitals:    08/15/24 1413   BP: 130/86   Pulse: 67       PHYSICAL EXAM  Vitals and nursing note reviewed.   Constitutional:       Appearance: Normal appearance.   HENT:      Head: Normocephalic.  Periorbital ecchymosis.  Neck:      Vascular: No JVD. Carotid upstrokes II/IV.  Cardiovascular:      Rate and Rhythm: Normal rate and regular rhythm.      Pulses: Normal pulses.      Heart sounds: Normal S1 S2, no S3 S4.  No murmurs or rubs.  Pulmonary:      Effort: Pulmonary effort is normal. Respirations regular and nonlabored.     Breath sounds: Clear to auscultation posterior laterally.  Abdominal:      General: Bowel sounds are normal.      Palpations: Abdomen is soft.   Musculoskeletal:         General: Normal range of motion.      Cervical back: Normal range  of motion.   Skin:     General: Skin is warm and dry.  Left subclavian pacemaker pocket is well-healed without redness swelling or drainage.  Neurological:      General: No focal deficit present.      Mental Status: Alert and oriented to person, place, and time.      Motor: Motor function is intact.   Psychiatric:         Attention and Perception: Attention and perception normal.         Mood and Affect: Mood and affect normal.         Speech: Speech normal.         Behavior: Behavior normal. Behavior is cooperative.         Thought Content: Thought content normal.         Cognition and Memory: Cognition and memory normal.     Labs and testing: Twelve-lead EKG reveals atrial and ventricular pacing at 67 bpm.  QRS durations 130 ms,  ms, QTc 469 ms.  Pacemaker interrogation dated August 15, 2024 reveals atrial pacing 73% and biventricular pacing 99.9%.  No atrial or ventricular arrhythmic events are noted.  Estimated battery longevity is 3 months.      ASSESSMENT AND PLAN       Clinical impressions:  1. Congenital complete heart block status post dual-chamber pacemaker implant in 1987 with upgrade to an AV biventricular pacemaker on July 27, 2018 for the treatment of refractory heart failure (Medtronic Percepta Quad CRTâ€“P).  2. Patent ductus ligation in 1976.  3. Nonischemic cardiomyopathy with a left ventricular ejection fraction was reduced to 43%, improved to 53% per 2D echocardiogram dated January 24, 2023, New York Heart Association class II, stage C heart failure.  4. Embolic CVA in 2004 with no ongoing neurological deficits.  5. Tobacco use.  6. Hypertension, controlled with a blood pressure today of 130/86.  7. Left heart catheterization dated July 11, 2016 revealing normal coronaries and graded exercise stress test dated March 11, 2022 revealing no acute ischemic changes with controlled blood pressure.  9. Diabetes mellitus.  10. Chronic anticoagulation therapy with Eliquis.  11. Legally blind.  12.  Overweight with a BMI of 28.25.    Recommendations:  1.  I discussed with the patient insurance company will not pay for the device to be changed out until it reaches elective replacement indicator.  As soon as the device reaches LOUIS we will make arrangements for generator change out.  I discussed with the patient typically after the device reaches LOUIS the battery longevity is still approximately 3 to 6 months.  We would make arrangements to change the generator as soon as possible.  Patient will continue monthly device checks remotely.  He is scheduled for remote check on September 19, 2024.  2.  Continue current medications as prescribed.  3.  Continue lifestyle modifications as discussed.  4.  Patient will be scheduled for an in clinic device check and office visit with Dr. Blue in 6 months as a routine visit.  I did explain to the patient he will pad the generator change most likely prior to that office visit.    Evaluation and note by Bernadette Mahmood CNP  **Please excuse any errors in grammar or translation related to this dictation.  Voice recognition software was utilized to prepare this document.**

## 2024-08-20 NOTE — PROGRESS NOTES
"Subjective   Patient ID: Brandt Hdz is a 53 y.o. male who presents for Depression.    Go over test results     Cardiologist put him on jardance would like to know if he should still take metformin     HPI      GERD stable no red flag symptoms    Hypertension controlled no chest pain or shortness of breath    Patient smokes and should quit discussed risks    Mood symptoms of depression and anxiety ongoing  No suicidal ideation no psychotic or manic symptoms  Keep specialist follow-up and counseling follow-up    A-fib stable keep cardiac follow-up    Diabetes with fair control recently started Jardiance will continue metformin  Recommend good diet  Stay current with diabetic health maintenance  Review of Systems   Constitutional:  Negative for activity change and fatigue.   HENT:  Negative for congestion and sore throat.    Eyes:  Negative for discharge.   Respiratory:  Negative for cough, chest tightness and shortness of breath.    Cardiovascular:  Negative for chest pain and leg swelling.   Gastrointestinal:  Negative for abdominal pain, blood in stool, constipation, diarrhea, nausea and vomiting.   Endocrine: Negative for cold intolerance and heat intolerance.   Genitourinary:  Negative for difficulty urinating and hematuria.   Musculoskeletal:  Negative for arthralgias, back pain, gait problem, myalgias and neck pain.   Allergic/Immunologic: Negative for environmental allergies.   Neurological:  Negative for dizziness, syncope, weakness, numbness and headaches.   Hematological:  Negative for adenopathy. Does not bruise/bleed easily.   Psychiatric/Behavioral:  Negative for dysphoric mood. The patient is not nervous/anxious.    All other systems reviewed and are negative.      Objective   /88 (BP Location: Right arm, BP Cuff Size: Adult)   Pulse 89   Ht 1.676 m (5' 6\")   Wt 76.7 kg (169 lb 3.2 oz)   SpO2 96%   BMI 27.31 kg/m²    Physical Exam  Vitals and nursing note reviewed.   Constitutional:       " General: He is not in acute distress.     Appearance: Normal appearance.   HENT:      Head: Normocephalic and atraumatic.      Right Ear: Tympanic membrane, ear canal and external ear normal.      Left Ear: Tympanic membrane, ear canal and external ear normal.      Nose: Nose normal.      Mouth/Throat:      Mouth: Mucous membranes are moist.      Pharynx: Oropharynx is clear. No oropharyngeal exudate or posterior oropharyngeal erythema.   Eyes:      Extraocular Movements: Extraocular movements intact.      Conjunctiva/sclera: Conjunctivae normal.      Pupils: Pupils are equal, round, and reactive to light.   Cardiovascular:      Rate and Rhythm: Normal rate and regular rhythm.      Pulses: Normal pulses.      Heart sounds: Normal heart sounds. No murmur heard.  Pulmonary:      Effort: Pulmonary effort is normal. No respiratory distress.      Breath sounds: Normal breath sounds. No wheezing or rales.   Abdominal:      General: Abdomen is flat. Bowel sounds are normal. There is no distension.      Palpations: Abdomen is soft. There is no mass.      Tenderness: There is no abdominal tenderness.   Musculoskeletal:         General: No swelling or deformity. Normal range of motion.      Cervical back: Normal range of motion and neck supple.      Right lower leg: No edema.      Left lower leg: No edema.   Lymphadenopathy:      Cervical: No cervical adenopathy.   Skin:     General: Skin is warm and dry.      Capillary Refill: Capillary refill takes less than 2 seconds.      Findings: No lesion or rash.   Neurological:      General: No focal deficit present.      Mental Status: He is alert and oriented to person, place, and time.      Cranial Nerves: No cranial nerve deficit.      Motor: No weakness.   Psychiatric:         Mood and Affect: Mood normal.         Behavior: Behavior normal.         Thought Content: Thought content normal.         Judgment: Judgment normal.         Assessment/Plan   Problem List Items Addressed  This Visit       Current every day smoker    Depression, major, in partial remission (CMS-[facility])    Relevant Orders    Follow Up In [facility] - PCP    Referral to Psychiatry    Gastroesophageal reflux disease without esophagitis - Primary    Primary hypertension    Paroxysmal atrial fibrillation (Multi)    Type 2 diabetes mellitus with hyperglycemia, without long-term current use of insulin (Multi)     Other Visit Diagnoses       Anxiety        Relevant Medications    hydrOXYzine HCL (Atarax) 25 mg tablet            Patient education provided.  Stay current with age appropriate health maintenance as instructed.  Appointment here or ER with new or worsening symptoms'  Keep appropriate follow-up visit.  Stay current with proper immunizations       Refer as above  Recheck 3 months and as needed  Report suicidal ideation report psychotic or manic symptoms  Keep cardiac follow-up  Keep proper diabetic health maintenance

## 2024-08-22 ENCOUNTER — LAB (OUTPATIENT)
Dept: LAB | Facility: LAB | Age: 54
End: 2024-08-22
Payer: COMMERCIAL

## 2024-08-22 DIAGNOSIS — E11.65 TYPE 2 DIABETES MELLITUS WITH HYPERGLYCEMIA, WITHOUT LONG-TERM CURRENT USE OF INSULIN (MULTI): ICD-10-CM

## 2024-08-22 DIAGNOSIS — I10 PRIMARY HYPERTENSION: ICD-10-CM

## 2024-08-22 DIAGNOSIS — I50.32 HEART FAILURE WITH RECOVERED EJECTION FRACTION (HFRECEF) (MULTI): ICD-10-CM

## 2024-08-22 LAB
ALBUMIN SERPL BCP-MCNC: 4.5 G/DL (ref 3.4–5)
ALP SERPL-CCNC: 62 U/L (ref 33–120)
ALT SERPL W P-5'-P-CCNC: 13 U/L (ref 10–52)
ANION GAP SERPL CALC-SCNC: 15 MMOL/L (ref 10–20)
AST SERPL W P-5'-P-CCNC: 13 U/L (ref 9–39)
BASOPHILS # BLD AUTO: 0.05 X10*3/UL (ref 0–0.1)
BASOPHILS NFR BLD AUTO: 0.6 %
BILIRUB SERPL-MCNC: 0.7 MG/DL (ref 0–1.2)
BNP SERPL-MCNC: 119 PG/ML (ref 0–99)
BUN SERPL-MCNC: 16 MG/DL (ref 6–23)
CALCIUM SERPL-MCNC: 9.6 MG/DL (ref 8.6–10.3)
CHLORIDE SERPL-SCNC: 99 MMOL/L (ref 98–107)
CHOLEST SERPL-MCNC: 204 MG/DL (ref 0–199)
CHOLESTEROL/HDL RATIO: 6.4
CO2 SERPL-SCNC: 27 MMOL/L (ref 21–32)
CREAT SERPL-MCNC: 1.19 MG/DL (ref 0.5–1.3)
EGFRCR SERPLBLD CKD-EPI 2021: 73 ML/MIN/1.73M*2
EOSINOPHIL # BLD AUTO: 0.18 X10*3/UL (ref 0–0.7)
EOSINOPHIL NFR BLD AUTO: 2.1 %
ERYTHROCYTE [DISTWIDTH] IN BLOOD BY AUTOMATED COUNT: 13.7 % (ref 11.5–14.5)
EST. AVERAGE GLUCOSE BLD GHB EST-MCNC: 269 MG/DL
GLUCOSE SERPL-MCNC: 233 MG/DL (ref 74–99)
HBA1C MFR BLD: 11 %
HCT VFR BLD AUTO: 51.1 % (ref 41–52)
HDLC SERPL-MCNC: 31.9 MG/DL
HGB BLD-MCNC: 16.5 G/DL (ref 13.5–17.5)
IMM GRANULOCYTES # BLD AUTO: 0.05 X10*3/UL (ref 0–0.7)
IMM GRANULOCYTES NFR BLD AUTO: 0.6 % (ref 0–0.9)
LDLC SERPL CALC-MCNC: 128 MG/DL
LYMPHOCYTES # BLD AUTO: 1.64 X10*3/UL (ref 1.2–4.8)
LYMPHOCYTES NFR BLD AUTO: 19.1 %
MCH RBC QN AUTO: 29.3 PG (ref 26–34)
MCHC RBC AUTO-ENTMCNC: 32.3 G/DL (ref 32–36)
MCV RBC AUTO: 91 FL (ref 80–100)
MONOCYTES # BLD AUTO: 0.67 X10*3/UL (ref 0.1–1)
MONOCYTES NFR BLD AUTO: 7.8 %
NEUTROPHILS # BLD AUTO: 5.99 X10*3/UL (ref 1.2–7.7)
NEUTROPHILS NFR BLD AUTO: 69.8 %
NON HDL CHOLESTEROL: 172 MG/DL (ref 0–149)
NRBC BLD-RTO: 0 /100 WBCS (ref 0–0)
PLATELET # BLD AUTO: 250 X10*3/UL (ref 150–450)
POTASSIUM SERPL-SCNC: 4.4 MMOL/L (ref 3.5–5.3)
PROT SERPL-MCNC: 7.5 G/DL (ref 6.4–8.2)
RBC # BLD AUTO: 5.63 X10*6/UL (ref 4.5–5.9)
SODIUM SERPL-SCNC: 137 MMOL/L (ref 136–145)
TRIGL SERPL-MCNC: 219 MG/DL (ref 0–149)
VLDL: 44 MG/DL (ref 0–40)
WBC # BLD AUTO: 8.6 X10*3/UL (ref 4.4–11.3)

## 2024-08-22 PROCEDURE — 83880 ASSAY OF NATRIURETIC PEPTIDE: CPT

## 2024-08-22 PROCEDURE — 85025 COMPLETE CBC W/AUTO DIFF WBC: CPT

## 2024-08-22 PROCEDURE — 83036 HEMOGLOBIN GLYCOSYLATED A1C: CPT

## 2024-08-22 PROCEDURE — 80061 LIPID PANEL: CPT

## 2024-08-22 PROCEDURE — 80053 COMPREHEN METABOLIC PANEL: CPT

## 2024-08-26 ENCOUNTER — APPOINTMENT (OUTPATIENT)
Dept: PRIMARY CARE | Facility: CLINIC | Age: 54
End: 2024-08-26
Payer: MEDICARE

## 2024-08-26 VITALS
WEIGHT: 169.2 LBS | HEART RATE: 89 BPM | BODY MASS INDEX: 27.19 KG/M2 | SYSTOLIC BLOOD PRESSURE: 140 MMHG | DIASTOLIC BLOOD PRESSURE: 88 MMHG | OXYGEN SATURATION: 96 % | HEIGHT: 66 IN

## 2024-08-26 DIAGNOSIS — I48.0 PAROXYSMAL ATRIAL FIBRILLATION (MULTI): ICD-10-CM

## 2024-08-26 DIAGNOSIS — F33.41 RECURRENT MAJOR DEPRESSIVE DISORDER, IN PARTIAL REMISSION (CMS-HCC): ICD-10-CM

## 2024-08-26 DIAGNOSIS — K21.9 GASTROESOPHAGEAL REFLUX DISEASE WITHOUT ESOPHAGITIS: Primary | ICD-10-CM

## 2024-08-26 DIAGNOSIS — F41.9 ANXIETY: ICD-10-CM

## 2024-08-26 DIAGNOSIS — I10 PRIMARY HYPERTENSION: ICD-10-CM

## 2024-08-26 DIAGNOSIS — E11.65 TYPE 2 DIABETES MELLITUS WITH HYPERGLYCEMIA, WITHOUT LONG-TERM CURRENT USE OF INSULIN (MULTI): ICD-10-CM

## 2024-08-26 DIAGNOSIS — F17.200 CURRENT EVERY DAY SMOKER: ICD-10-CM

## 2024-08-26 PROCEDURE — 3079F DIAST BP 80-89 MM HG: CPT | Performed by: FAMILY MEDICINE

## 2024-08-26 PROCEDURE — 3049F LDL-C 100-129 MG/DL: CPT | Performed by: FAMILY MEDICINE

## 2024-08-26 PROCEDURE — 3077F SYST BP >= 140 MM HG: CPT | Performed by: FAMILY MEDICINE

## 2024-08-26 PROCEDURE — 3046F HEMOGLOBIN A1C LEVEL >9.0%: CPT | Performed by: FAMILY MEDICINE

## 2024-08-26 PROCEDURE — 99214 OFFICE O/P EST MOD 30 MIN: CPT | Performed by: FAMILY MEDICINE

## 2024-08-26 PROCEDURE — 3008F BODY MASS INDEX DOCD: CPT | Performed by: FAMILY MEDICINE

## 2024-08-26 PROCEDURE — 4010F ACE/ARB THERAPY RXD/TAKEN: CPT | Performed by: FAMILY MEDICINE

## 2024-08-26 RX ORDER — HYDROXYZINE HYDROCHLORIDE 25 MG/1
25 TABLET, FILM COATED ORAL 3 TIMES DAILY
Qty: 60 TABLET | Refills: 2 | Status: SHIPPED | OUTPATIENT
Start: 2024-08-26 | End: 2024-10-25

## 2024-08-26 ASSESSMENT — ENCOUNTER SYMPTOMS
DYSPHORIC MOOD: 0
DIZZINESS: 0
ACTIVITY CHANGE: 0
BACK PAIN: 0
ABDOMINAL PAIN: 0
COUGH: 0
SHORTNESS OF BREATH: 0
NAUSEA: 0
NUMBNESS: 0
DIFFICULTY URINATING: 0
MYALGIAS: 0
HEADACHES: 0
CONSTIPATION: 0
HEMATURIA: 0
CHEST TIGHTNESS: 0
BRUISES/BLEEDS EASILY: 0
EYE DISCHARGE: 0
VOMITING: 0
FATIGUE: 0
DIARRHEA: 0
NECK PAIN: 0
WEAKNESS: 0
NERVOUS/ANXIOUS: 0
ADENOPATHY: 0
ARTHRALGIAS: 0
BLOOD IN STOOL: 0
SORE THROAT: 0

## 2024-08-29 DIAGNOSIS — I10 PRIMARY HYPERTENSION: ICD-10-CM

## 2024-08-29 DIAGNOSIS — E11.65 TYPE 2 DIABETES MELLITUS WITH HYPERGLYCEMIA, WITHOUT LONG-TERM CURRENT USE OF INSULIN (MULTI): ICD-10-CM

## 2024-08-29 RX ORDER — ATORVASTATIN CALCIUM 10 MG/1
10 TABLET, FILM COATED ORAL DAILY
Qty: 90 TABLET | Refills: 0 | Status: SHIPPED | OUTPATIENT
Start: 2024-08-29 | End: 2025-10-03

## 2024-08-29 NOTE — TELEPHONE ENCOUNTER
Patient called requesting his lab results. According to Dr. Fortune he wanted him to start taking Lipitor 10 mg and Januvia 100 mg due to the elevated cholesterol and elevated fasting sugar from recent lab results. Those medications have been pended. Please approve or deny. Thank you.

## 2024-08-30 ENCOUNTER — TELEPHONE (OUTPATIENT)
Dept: PEDIATRIC CARDIOLOGY | Facility: HOSPITAL | Age: 54
End: 2024-08-30
Payer: COMMERCIAL

## 2024-08-30 DIAGNOSIS — I50.9 HEART FAILURE, UNSPECIFIED HF CHRONICITY, UNSPECIFIED HEART FAILURE TYPE (MULTI): ICD-10-CM

## 2024-08-30 DIAGNOSIS — I51.9 LEFT VENTRICULAR SYSTOLIC DYSFUNCTION: ICD-10-CM

## 2024-08-30 NOTE — TELEPHONE ENCOUNTER
08/30/24 at 12:16 PM     Spoke with: Patient   [phone]     Shared lab results per Dr. Renner, and plan to continue meds as is. Still plan for repeat labs 2 months after his appointment, which would be mid-October.    Confirmed understanding, no further questions or issues to be addressed. Encouraged reaching out if there was anything else needed.     - Sharonda Espinoza, RN  [facility] RN Patient Navigator  Pediatric Cardiology  [phone]

## 2024-08-30 NOTE — TELEPHONE ENCOUNTER
----- Message from Isidro Renner sent at 8/29/2024  9:12 AM EDT -----  Reviewed labs. Cont current meds

## 2024-09-12 ENCOUNTER — APPOINTMENT (OUTPATIENT)
Dept: BEHAVIORAL HEALTH | Facility: CLINIC | Age: 54
End: 2024-09-12
Payer: MEDICARE

## 2024-09-12 DIAGNOSIS — F41.1 GAD (GENERALIZED ANXIETY DISORDER): ICD-10-CM

## 2024-09-12 DIAGNOSIS — F33.1 MODERATE RECURRENT MAJOR DEPRESSION: ICD-10-CM

## 2024-09-12 DIAGNOSIS — F31.81 BIPOLAR II DISORDER (MULTI): ICD-10-CM

## 2024-09-12 PROCEDURE — 4010F ACE/ARB THERAPY RXD/TAKEN: CPT | Performed by: CLINICAL NURSE SPECIALIST

## 2024-09-12 PROCEDURE — 3049F LDL-C 100-129 MG/DL: CPT | Performed by: CLINICAL NURSE SPECIALIST

## 2024-09-12 PROCEDURE — 3046F HEMOGLOBIN A1C LEVEL >9.0%: CPT | Performed by: CLINICAL NURSE SPECIALIST

## 2024-09-12 PROCEDURE — 99205 OFFICE O/P NEW HI 60 MIN: CPT | Performed by: CLINICAL NURSE SPECIALIST

## 2024-09-12 NOTE — PROGRESS NOTES
Outpatient Psychiatry      Subjective   Brandt Hdz, a 53 y.o. male, presents for an initial psychiatric evaluation visit for an audio visual appointment , virtual appointment , he was at home for this appointment and verbally consented to the appointment   Patient is referred by Mani Fortune MD .      Diagnoses : recurrent major depressive disorder moderate F33.1  Generalized anxiety disorder F 41.1  Bipolar II disorder mild F 31.81    Patient Active Problem List   Diagnosis    Atrioventricular block, complete (Multi)    Biceps tendonitis    Cardiac pacemaker    Cardiomyopathy (Multi)    CHD (congenital heart disease) ([facility])    Central pain syndrome    Cognitive deficits as late effect of cerebrovascular disease    Chronic fatigue    Complaint of memory disorder without observed objective memory deficit    Congenital heart block ([facility])    Congenital heart failure (Multi)    Current every day smoker    Decreased coordination    Stress reaction, chronic    Depression, major, in partial remission (CMS-[facility])    Dyspnea on exertion    Erectile dysfunction    Gait, antalgic    Gastroesophageal reflux disease without esophagitis    Primary hypertension    Hyperhidrosis of soles    Hyperkeratosis    Left ventricular systolic dysfunction    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)    Low testosterone    Neuropathy    PDA (patent ductus arteriosus) ([facility])    Polyp of colon    Rotator cuff syndrome of right shoulder    Sarcoidosis    Paroxysmal atrial fibrillation (Multi)    Sinus node dysfunction (Multi)    RAD (reactive airway disease) ([facility])    Sleep apnea    Sleep disturbance    Stroke (Multi)    Type 2 diabetes mellitus with hyperglycemia, without long-term current use of insulin (Multi)    Verruca plana    Xerosis of skin    Acute hepatitis C virus infection without hepatic coma    Attention deficit hyperactivity disorder, inattentive type    Congenital nystagmus     Decreased strength    Major depressive disorder, recurrent severe without psychotic features (Multi)    Insomnia    Neuropathic pain    Prediabetes    Pseudophakia    PVT (portal vein thrombosis)    Refractive error    Regular astigmatism of both eyes    Diabetes mellitus without complication (Multi)    Unsteady gait    Visual impairment    Word finding difficulty    Chronic painful diabetic neuropathy (Multi)    Acute bronchitis due to other specified organisms    Chronic cough    High risk medication use    BMI 29.0-29.9,adult    Encounter for medication review and counseling    Encounter to discuss treatment options    Partial seizure disorder (Multi)    Focal epilepsy (Multi)    Left ventricular systolic dysfunction (LVSD)    Obesity    Keratosis    Obesity with body mass index 30 or greater    Primary focal hyperhidrosis of soles    Antalgic gait    Gastroesophageal reflux disease    Smokes tobacco daily    Impaired cognition    Headache    Fatigue    Hyperglycemia    Disorder of rotator cuff    Amnesia    Cognitive deficit due to and not concurrent with cerebrovascular disease    Acute prostatitis    Attention deficit hyperactivity disorder (ADHD), predominantly inattentive type    Decreased testosterone level    Atrial fibrillation (Multi)    Hyperplastic polyp of large intestine    Hypertension    Portal vein thrombosis    Reactive airway disease ([facility])    Disturbance in sleep behavior    Disorder of refraction    Reaction to chronic stress    Depressive disorder    Type 2 diabetes mellitus (Multi)    Asteatosis cutis    Abnormal gait    Bipolar II disorder (Multi)    Mild vascular dementia without behavioral disturbance, psychotic disturbance, mood disturbance, or anxiety (Multi)    Heart failure with recovered ejection fraction (HFrecEF) (Multi)     Treatment Plan/Recommendations:   Follow-up plan for depression was discussed with patient.  Follow up in 4-6 weeks   Can call  for treatment  and scheduling concerns   Can continue self care and wellness efforts and maintain routine health screenings  Continue bupropion xl 300 mg , daily each morning for depression   Continue hydoxyzine for anxiety 25 mg , three times a day as needed , reduce use at since is sleeping too much , at the next appointment work towards reducing the dose   Continue vraylar 4.5 mg at bedtime for depression     On lamotrigine for central nerve pain per neurology , this can also help with bipolar II disorder , 200 mg twice a day      Review with patient: Treatment plan reviewed with the patient.  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   He used to enjoy reading , mainly comedies   He feels like he has some support in his life , he has 2 older brothers   He is in [place] and has friends in [place]   He goes to meetings for [employer] every night , he gets a ride   He has been sober for 8 years   He says he just knew he needed to change and he did not want a relative affected by his drinking   He has a concern about memory , and says his short term memory and long term memory are poor , and he mentioned he is still able to take care of himself   He usually remembers to take his medications , he does not use pill boxes says he can forget to take them once a week   He was seeing a psychiatrist at [facility] in [place] , he was seen there for a year or two he does not remember much about that place he says   He has been on the current psych meds for a long time   He has been on bupropion for a year or 2 ago when he was really depressed and anxious , he says he gets very nervous and shaky inside   He has been on vraylar 4.5 mg , daily since nov 2023 and this has helped some   He feels the need to leave where he is when he gets anxious   He takes propranolol as needed for  anxiety and he also takes hydroxyzine as needed for anxiety   He has been on lamotrigine for central nerve pain per neurology   He is diagnosed with bipolar 2 disorder , he had a manic episode a long time ago , he does not remember when   Has a history of psych hospitalization in 2012 for suicidal ideation   He sees a therapist for counseling at [facility] , he is seeing a new therapist , whom he has seen for six months , he says he is working on trying to get himself motivated to do anything   Previous psych meds :  Depakote : does not remember the response   Remeron : too sedating   Zoloft has been taken , he says he did well on this   Prozac : does not remember the response   Ranks depression as a 7-8 on scale of 10 being the worst , he has felt like this for a few years   He sleeps about 9-10 hours a night , he naps twice a day for an hour each time   Encouraged to cut back on hydroxyzine and maintain a sleep wake cycle   He says much of his low mood and anxiety is from changes he has had with his health and not having a car   Support provided     Psych ros and medical ros as noted above     Current Outpatient Medications:     albuterol (Proventil HFA) 90 mcg/actuation inhaler, Inhale 2 puffs every 4 hours if needed for wheezing or shortness of breath., Disp: 18 g, Rfl: 0    atorvastatin (Lipitor) 10 mg tablet, Take 1 tablet (10 mg) by mouth once daily., Disp: 90 tablet, Rfl: 0    buPROPion XL (Wellbutrin XL) 300 mg 24 hr tablet, Take 1 tablet (300 mg) by mouth once daily in the morning. Take before meals., Disp: 90 tablet, Rfl: 3    Eliquis 5 mg tablet, Take 1 tablet (5 mg) by mouth 2 times a day., Disp: 180 tablet, Rfl: 1    empagliflozin (Jardiance) 10 mg, Take 1 tablet (10 mg) by mouth once daily., Disp: 30 tablet, Rfl: 11    furosemide (Lasix) 20 mg tablet, TAKE 1 TABLET BY MOUTH DAILY, Disp: 90 tablet, Rfl: 3    gabapentin (Neurontin) 400 mg capsule, Take 1 capsule (400 mg) by mouth 2 times a day., Disp: 180  capsule, Rfl: 1    hydrOXYzine HCL (Atarax) 25 mg tablet, Take 1 tablet (25 mg) by mouth 3 times a day. prn, Disp: 60 tablet, Rfl: 2    lamoTRIgine (LaMICtal) 200 mg tablet, Take 1 tablet (200 mg) by mouth 2 times a day., Disp: 60 tablet, Rfl: 2    melatonin 10 mg tablet, Take 2 tablets (20 mg) by mouth as needed at bedtime., Disp: , Rfl:     metFORMIN (Glucophage) 500 mg tablet, Take 1 tablet (500 mg) by mouth once daily., Disp: 90 tablet, Rfl: 3    propranolol (Inderal) 40 mg tablet, Take 1 tablet (40 mg) by mouth 3 times a day., Disp: 90 tablet, Rfl: 2    SITagliptin phosphate (Januvia) 100 mg tablet, Take 1 tablet (100 mg) by mouth once daily., Disp: 90 tablet, Rfl: 0    tadalafil (Cialis) 20 mg tablet, Take 1 tablet (20 mg) by mouth once daily as needed for erectile dysfunction., Disp: 10 tablet, Rfl: 2    tamsulosin (Flomax) 0.4 mg 24 hr capsule, Take 1 capsule (0.4 mg) by mouth once daily., Disp: 30 capsule, Rfl: 11    telmisartan (Micardis) 20 mg tablet, Take 1 tablet (20 mg) by mouth once daily., Disp: 30 tablet, Rfl: 11    Vraylar 4.5 mg capsule, 1 capsule (4.5 mg) once daily at bedtime., Disp: 90 capsule, Rfl: 3  Medical History:  Past Medical History:   Diagnosis Date    Acute bronchitis 02/08/2023    Atrioventricular block, complete (Multi) 07/12/2022    Atrioventricular block, complete    Cough 02/08/2023    Essential (primary) hypertension 05/24/2022    HTN (hypertension)    Foot pain, bilateral 02/08/2023    Hemiplegia, unspecified affecting left nondominant side (Multi) 08/20/2020    Left hemiparesis    Hemiplegia, unspecified affecting left nondominant side (Multi) 08/20/2020    Left hemiparesis    Hemiplegia, unspecified affecting left nondominant side (Multi) 08/20/2020    Left hemiparesis    Hemiplegia, unspecified affecting left nondominant side (Multi) 08/20/2020    Left hemiparesis    History of bipolar disorder 11/01/2019    Last Assessment & Plan: Formatting of this note might be different  from the original. Assessment: Stable at this time On multiple medications Follows with Psychiatry    History of sick sinus syndrome 11/01/2019    History of stroke 01/03/2020    Obstructive and reflux uropathy, unspecified 04/07/2017    Obstructive uropathy    Onychomycosis 02/08/2023    Other conditions influencing health status 08/20/2020    Stroke syndrome    Other sleep disorders 03/28/2014    Sleep paralysis    Personal history of (corrected) congenital malformations of heart and circulatory system 01/25/2022    History of congenital heart block    Personal history of colonic polyps 07/20/2020    Personal history of colonic polyps    Personal history of other endocrine, nutritional and metabolic disease 09/15/2015    History of hyperlipidemia    Personal history of other specified conditions 09/03/2020    History of memory loss    Presence of cardiac pacemaker 07/12/2022    Cardiac pacemaker    Unspecified viral hepatitis C without hepatic coma 07/21/2016    Viral hepatitis C without hepatic coma    URI (upper respiratory infection) 02/08/2023     Surgical History:  Past Surgical History:   Procedure Laterality Date    APPENDECTOMY      APPENDECTOMY  04/27/2018    Appendectomy    CARDIAC PACEMAKER PLACEMENT  07/09/2018    Pacemaker Placement    MEDIASTINAL MASS EXCISION      OTHER SURGICAL HISTORY  08/24/2020    Colonoscopy complete for polypectomy    OTHER SURGICAL HISTORY  07/20/2020    Colonoscopy    OTHER SURGICAL HISTORY  04/07/2017    Patent Ductus Arteriosus Repair    OTHER SURGICAL HISTORY  04/07/2017    Mediastinoscopy With Biopsy Of Mediastinal Mass    PATENT DUCTUS ARTERIOUS LIGATION       Family History:  Family History   Problem Relation Name Age of Onset    Stroke Mother      Cancer Mother      Other (cardiac disorder) Mother      Lymphoma Mother      Lung cancer Mother      Colon cancer Mother      Liver cancer Mother      Colon cancer Father      Diabetes Father      Stroke Father      Other  (Other) Father      Cancer Father      Ulcerative colitis Brother       Record Review: brief    Vitals:  There were no vitals filed for this visit.    Charmaine Irving, APRN-CNS

## 2024-09-19 ENCOUNTER — HOSPITAL ENCOUNTER (OUTPATIENT)
Dept: CARDIOLOGY | Facility: HOSPITAL | Age: 54
Discharge: HOME | End: 2024-09-19
Payer: MEDICARE

## 2024-09-19 DIAGNOSIS — Z95.0 CARDIAC PACEMAKER IN SITU: Primary | ICD-10-CM

## 2024-09-19 DIAGNOSIS — I44.2 ATRIOVENTRICULAR BLOCK, COMPLETE (MULTI): ICD-10-CM

## 2024-09-19 DIAGNOSIS — Z95.0 CARDIAC PACEMAKER IN SITU: ICD-10-CM

## 2024-10-07 ENCOUNTER — TELEPHONE (OUTPATIENT)
Dept: PEDIATRIC CARDIOLOGY | Facility: HOSPITAL | Age: 54
End: 2024-10-07
Payer: COMMERCIAL

## 2024-10-07 NOTE — TELEPHONE ENCOUNTER
10/07/24 at 1:00 PM     Attempted to reach: Patient   [phone]     Call went to voicemail, left message confirming appointment as follows:  Provider: Dr. Renner  Location:  [place]  Date: 10/8  Time: Appointment: 1030    Asked for return call to confirm medications and allergies and provided contact info.        - Sharonda Espinoza, RN  [facility] RN Patient Navigator  Pediatric Cardiology  [phone]

## 2024-10-08 ENCOUNTER — APPOINTMENT (OUTPATIENT)
Dept: PEDIATRIC CARDIOLOGY | Facility: CLINIC | Age: 54
End: 2024-10-08
Payer: COMMERCIAL

## 2024-10-09 NOTE — PROGRESS NOTES
Patient Name: Brandt Hdz   Provider: Isidro Renner MD  : 1970  Date of Service: 2024    DIAGNOSES:     1. PDA  a. 1976 s/p PDA ligation via left thoracotomy ([facility])  2. Congenital CHB s/p Initial PPM  ()  a.  PPM implant (Dr. Joiner)   b. Abandoned RV lead from , active RA lead from   c. 2018 Status post upgrade to CRT-P 2018.Medtronic W4 TR 01 biventricular pacemaker  3. Nonischemic Cardiomyopathy, HFrecEF  a. Cardiac cath  Normal Coronaries; EF 45 %  b. TTE 2022 Dilated LA, mild MR, LV EF ~ 53%  c. Echo 2023 Dilated LA, mild MR, LVEF 55% with abnormal longitudinal strain  4. H/o embolic CVA , on AC- Coumadin  a. Recent stroke like event on 2023- CT Brain negative for acute infarct/bleed  5. Smoker 1 pack per day x 35 years  6. Legally blind  7. HTN  a. Cardiac Stress Test 3/2022 with normal BP response    INTERVAL HISTORY:     I saw Brandt Hdz today in the [facility],  [facility] and [facility] [place] at [facility] multi [facility] clinic.  He was last seen by me 24 after he had missed several scheduled follow up appointments.     Mr. Hdz is a 54 y/o male with h/o PDA, s/p ligation. He has congenital CHB. In 2018, his PPM was upgraded to a CRT-P Medtronic W4 TR 01 BiV pacemaker. He has non ischemic cardiomyopathy, now with recovered EF.     He has a significant PMH, most notably for CVA on anticoagulation. He is a smoker with sleep apnea with chronic EUCEDA.      At his visit in , Mr. Hdz complained of SOB with light activity around house, climbing one flight of stairs. We felt he was volume overloaded with CVP around 16 mmHg, and started Lasix 20 mg a day which has been continued. He lost about 20 pounds but does not believe his EUCEDA has improved. No orthopnea, rare palpitations with anxiety, no edema, believes his waist is smaller. He  can climb a flight of stairs where he lives without stopping.     On 2/28/2023, patient was brought to the ER by EMS for stroke alert. It was evening time and he lost partial vision and had LE weakness. His mom called 911 and patient was brought to [facility]. He had left arm drift and vision loss in right eye. He underwent CT Brain with contrast which showed chronic infarct, unchanged from 2021. He was later assessed without neurological symptoms and was sent home the next day. His INR was sub-therapeutic at the time of the event (1.4). His coumadin dose has been adjusted by his PCP.     H/o of panic attacks, once weekly. He follows with psychiatry and is prescribed propanolol for anxiety.  He has chronic neuropathy on left arm and left leg with weakness post CVA.      PPM interrogated q6 months remotely. Follows with Dr. Blue.      Last visit he reported he had ongoing dyspnea with all exertion  Perhaps worse than previously reported.  No edema.  Some dizziness at times, particularly with bending.  We started Jardiance.      He returns today for follow up and reports no improvement in symptoms.  He can walk his dog without dyspnea, but picking anything up, bending and standing make him SOB.  No dyspnea while sitting.  No edema - has varicose veins.  Continues to smoke.  Does not see pulmonology.      ROS:   General: no fatigue, no fever, no weight loss, no excessive sweating, no decreased appetite  HEENT: no facial swelling, no hoarseness, no eye redness, no eye lid swelling  Cardiac: no fainting, no cyanosis, no chest pain, no palpitations  Respiratory: + shortness of breath, no coughing blood, no noisy breathing, no wheezing, no cough  GI: No abdomen pain, no constipation, no diarrhea, no vomiting  Musculoskeletal: no extremity swelling  Skin: no paleness, no rash, no yellow skin  Hematologic: no easy bruising, no easy bleeding  Neurologic: no seizures, no weakness    All systems negative unless otherwise  stated.    CURRENT MEDS:    Current Outpatient Medications:     atorvastatin (Lipitor) 10 mg tablet, Take 1 tablet (10 mg) by mouth once daily., Disp: 90 tablet, Rfl: 0    buPROPion XL (Wellbutrin XL) 300 mg 24 hr tablet, Take 1 tablet (300 mg) by mouth once daily in the morning. Take before meals., Disp: 90 tablet, Rfl: 0    cariprazine (Vraylar) 6 mg capsule, Take 1 capsule (6 mg) by mouth once daily. Resending script , please notify patient when this is ready for  . He reports script not received yet . This is an increased dose and replaces order for 4.5 mg daily, Disp: 90 capsule, Rfl: 0    Eliquis 5 mg tablet, Take 1 tablet (5 mg) by mouth 2 times a day., Disp: 180 tablet, Rfl: 1    empagliflozin (Jardiance) 10 mg, Take 1 tablet (10 mg) by mouth once daily., Disp: 30 tablet, Rfl: 11    furosemide (Lasix) 20 mg tablet, TAKE 1 TABLET BY MOUTH DAILY, Disp: 90 tablet, Rfl: 3    gabapentin (Neurontin) 400 mg capsule, Take 1 capsule (400 mg) by mouth 2 times a day., Disp: 180 capsule, Rfl: 1    hydrOXYzine HCL (Atarax) 25 mg tablet, Take 1 tablet (25 mg) by mouth 3 times a day. prn, Disp: 60 tablet, Rfl: 2    lamoTRIgine (LaMICtal) 200 mg tablet, TAKE 1 TABLET BY MOUTH TWICE DAILY, Disp: 60 tablet, Rfl: 2    melatonin 5 mg capsule, Take 1 capsule (5 mg) by mouth once daily at bedtime. Takes 1-2 capsules each night at bedtime, Disp: , Rfl:     metFORMIN (Glucophage) 500 mg tablet, Take 1 tablet (500 mg) by mouth once daily., Disp: 90 tablet, Rfl: 3    propranolol (Inderal) 40 mg tablet, Take 1 tablet (40 mg) by mouth 3 times a day., Disp: 90 tablet, Rfl: 2    SITagliptin phosphate (Januvia) 100 mg tablet, Take 1 tablet (100 mg) by mouth once daily., Disp: 90 tablet, Rfl: 0    tadalafil (Cialis) 20 mg tablet, Take 1 tablet (20 mg) by mouth once daily as needed for erectile dysfunction., Disp: 10 tablet, Rfl: 2    tamsulosin (Flomax) 0.4 mg 24 hr capsule, Take 1 capsule (0.4 mg) by mouth once daily., Disp: 30  "capsule, Rfl: 11    telmisartan (Micardis) 20 mg tablet, Take 1 tablet (20 mg) by mouth once daily., Disp: 30 tablet, Rfl: 11    albuterol (Proventil HFA) 90 mcg/actuation inhaler, Inhale 2 puffs every 4 hours if needed for wheezing or shortness of breath., Disp: 18 g, Rfl: 0    PHYSICAL EXAM:  /76 (BP Location: Right arm, Patient Position: Sitting, BP Cuff Size: Adult)   Pulse 91   Temp 36 °C (96.8 °F)   Resp 16   Ht 1.67 m (5' 5.75\")   Wt 75.4 kg (166 lb 3.6 oz)   SpO2 96%   BMI 27.04 kg/m²   Body mass index is 27.04 kg/m².    General: Well appearing and in no distress  HEENT: Moist mucous membranes  Neck: Supple, JVP normal, Carotid upstrokes brisk bilaterally and without bruits  Respiratory: Clear to ausculation bilaterally; no wheezing/rales/rhonchi; respirations non-labored  Cardiovascular: RRR,normal PMI, normal S1 and S2. No S3 or S4. No murmurs or rubs.   Gastrointestinal: soft, non-tender, no organomegaly, no abnormal aortic pulsation  Extremities: warm and well perfused with no cyanosis, clubbing, or edema  Neurologic: awake/alert, no focal deficits    DATA:    Echo 1/24/2023 images reviewed and interpreted by me  Left atrial enlargement  Mild MR  Device seen in Rt sided chambers  Normal LV size and function, abnormal global LV strain -17  Biplane LVEF 55%     Cardiac Stress Test 3/2022  1. Submaximal stress test.  2. Appropriate HR and blood pressure response.  3. The submaximal level of stress was achieved     Echo 1/25/2022   h/o PDA ligation  Mildly diminished LV function, LVEF ~53 % (improved from previous ECHO 2016)  Severely Dilated LA  Mild MR  PPM lead in RV with trivial TR, unable to measure RVSP  Elevated filling pressures, most likely diastolic dysfunction        Echo 2016 [facility]   1. The left ventricular systolic function is mildly to moderately decreased.   2. There is moderate asymmetric left ventricular hypertrophy.   3. The left atrium is severely dilated.   4. RVSP within normal " "limits.   5. The calculated ejection fraction is mild to moderately decreased at 39 % using the Nelson's Bi-plane MOD calculation.     Cardiac Cath 2016  Dr. Perea  Normal Coronaries  LV EF 45%     WBC   Date Value Ref Range Status   08/22/2024 8.6 4.4 - 11.3 x10*3/uL Final     Hemoglobin   Date Value Ref Range Status   08/22/2024 16.5 13.5 - 17.5 g/dL Final     MCV   Date Value Ref Range Status   08/22/2024 91 80 - 100 fL Final     Creatinine   Date Value Ref Range Status   08/22/2024 1.19 0.50 - 1.30 mg/dL Final   02/26/2023 1.14 0.50 - 1.30 mg/dL Final   03/07/2022 1.03 0.50 - 1.30 mg/dL Final       No components found for: \"MAGNESIUM\"  Total Protein   Date Value Ref Range Status   08/22/2024 7.5 6.4 - 8.2 g/dL Final     Albumin   Date Value Ref Range Status   08/22/2024 4.5 3.4 - 5.0 g/dL Final     ALT   Date Value Ref Range Status   08/22/2024 13 10 - 52 U/L Final     Comment:     Patients treated with Sulfasalazine may generate falsely decreased results for ALT.     AST   Date Value Ref Range Status   08/22/2024 13 9 - 39 U/L Final     INR   Date Value Ref Range Status   04/14/2023 1.5 (H) 0.9 - 1.1 Final     aPTT   Date Value Ref Range Status   02/26/2023 37 26 - 39 sec Final     Comment:       THE APTT IS NO LONGER USED FOR MONITORING     UNFRACTIONATED HEPARIN THERAPY.    FOR MONITORING HEPARIN THERAPY,     USE THE HEPARIN ASSAY.           ASSESSMENT & PLAN:      1. PDA  s/p ligation via left thoracotomy with no residual shunt      Echo 1/2023 no residual shunt, this does not require any future imaging     2. Congenital CHB  PPM with upgrade to CRT in 2018  Follows with Dr. Blue     3. HFrecEF  TTE 2016 EF 45%  TTE 1/25/2022 EF ~ 53% with evidence of elevated filling pressures  Echo 1/24/2023 LV EF 55%, abnormal LV strain  NYHA II-III     Symptoms of EUCEDA, he is now euvolemic with no improvement in symptoms. This may be pulmonary (chronic smoker)  Improved EF after CRT therapy  chronic diastolic HF, now " on SGLT2i.    ON AC for h/o stroke    Referral placed to Pulmonology  Referral placed to General Cardiology as I am retiring       4. HTN  He underwent exercise stress test in March 2022 which showed a normal BP response to exercise.     Continue Telmisartan 20 mg daily     5. h/o Stroke  Right MCA and right occipital stroke 2021, ? secondary to PAF, unknown last incidence of afib     Event 2/26/2023 with LE weakness and vision loss with suspicion for stroke. CT Brain negative for acute event.      Patient is on Eliquis, managed by PCP    Thank you for allowing me to participate in the care of this patient.  Please don't hesitate to contact us with any questions or concerns.    Sincerely,    Isidro Renner MD, MSc  Director, Adult Congenital Heart Disease Program  [facility] [place]

## 2024-10-10 ENCOUNTER — APPOINTMENT (OUTPATIENT)
Dept: BEHAVIORAL HEALTH | Facility: CLINIC | Age: 54
End: 2024-10-10
Payer: MEDICARE

## 2024-10-16 DIAGNOSIS — I48.0 PAROXYSMAL ATRIAL FIBRILLATION (MULTI): ICD-10-CM

## 2024-10-16 NOTE — TELEPHONE ENCOUNTER
Rx Refill Request     Name: Brandt Hdz  :  1970   Medication Name:  eliquis   Specific Pharmacy location:  [facility]  Date of last appointment:  Visit date not found   Date of next appointment:  Visit date not found   Best number to reach patient:  [phone]       Recent Visits  Date Type Provider Dept   24 Office Visit Mani Fortune MD Do [id number] [facility]   24 Office Visit Mani Fortune MD Do [id number] [facility]   24 Office Visit Mani Fortune MD Do [id number] [facility]   10/05/23 Office Visit Mani Fortune MD Do [id number] [facility]   23 Office Visit Mani Fortune MD Do [id number] [facility]   23 Office Visit Mani Fortune MD Do [id number] [facility]   Showing recent visits within past 540 days and meeting all other requirements  Future Appointments  Date Type Provider Dept   10/28/24 Appointment Mani Fortune MD Do [id number] [facility]   Showing future appointments within next 180 days and meeting all other requirements

## 2024-10-17 RX ORDER — APIXABAN 5 MG/1
5 TABLET, FILM COATED ORAL 2 TIMES DAILY
Qty: 180 TABLET | Refills: 1 | Status: SHIPPED | OUTPATIENT
Start: 2024-10-17

## 2024-10-21 ENCOUNTER — HOSPITAL ENCOUNTER (OUTPATIENT)
Dept: CARDIOLOGY | Facility: HOSPITAL | Age: 54
Discharge: HOME | End: 2024-10-21
Payer: COMMERCIAL

## 2024-10-21 ENCOUNTER — APPOINTMENT (OUTPATIENT)
Dept: CARDIOLOGY | Facility: HOSPITAL | Age: 54
End: 2024-10-21
Payer: COMMERCIAL

## 2024-10-21 DIAGNOSIS — Z95.0 CARDIAC PACEMAKER IN SITU: ICD-10-CM

## 2024-10-21 DIAGNOSIS — I44.2 ATRIOVENTRICULAR BLOCK, COMPLETE (MULTI): ICD-10-CM

## 2024-10-23 ENCOUNTER — APPOINTMENT (OUTPATIENT)
Dept: BEHAVIORAL HEALTH | Facility: CLINIC | Age: 54
End: 2024-10-23
Payer: MEDICARE

## 2024-10-23 DIAGNOSIS — F41.9 ANXIETY: ICD-10-CM

## 2024-10-23 DIAGNOSIS — F33.2 MAJOR DEPRESSIVE DISORDER, RECURRENT SEVERE WITHOUT PSYCHOTIC FEATURES (MULTI): ICD-10-CM

## 2024-10-23 DIAGNOSIS — F31.81 BIPOLAR II DISORDER (MULTI): ICD-10-CM

## 2024-10-23 DIAGNOSIS — F41.1 GAD (GENERALIZED ANXIETY DISORDER): ICD-10-CM

## 2024-10-23 RX ORDER — BUPROPION HYDROCHLORIDE 300 MG/1
300 TABLET ORAL
Qty: 90 TABLET | Refills: 0 | Status: SHIPPED | OUTPATIENT
Start: 2024-10-23 | End: 2025-01-21

## 2024-10-23 RX ORDER — MELATONIN 5 MG
5 CAPSULE ORAL NIGHTLY
COMMUNITY

## 2024-10-23 RX ORDER — HYDROXYZINE HYDROCHLORIDE 25 MG/1
25 TABLET, FILM COATED ORAL 3 TIMES DAILY
Qty: 60 TABLET | Refills: 2 | Status: SHIPPED | OUTPATIENT
Start: 2024-10-23 | End: 2024-12-22

## 2024-10-26 ENCOUNTER — DOCUMENTATION (OUTPATIENT)
Dept: BEHAVIORAL HEALTH | Facility: CLINIC | Age: 54
End: 2024-10-26
Payer: COMMERCIAL

## 2024-10-26 DIAGNOSIS — F31.81 BIPOLAR II DISORDER (MULTI): ICD-10-CM

## 2024-10-26 NOTE — PROGRESS NOTES
Nonbillable note : resent script for increased vraylar dose to pharmacy , though script sent on 10/23 shows as received in system , patient is reporting pharmacy does not have the new script kpacer cns

## 2024-10-28 ENCOUNTER — APPOINTMENT (OUTPATIENT)
Dept: PRIMARY CARE | Facility: CLINIC | Age: 54
End: 2024-10-28
Payer: COMMERCIAL

## 2024-11-03 DIAGNOSIS — G40.109 FOCAL EPILEPSY (MULTI): ICD-10-CM

## 2024-11-04 RX ORDER — LAMOTRIGINE 200 MG/1
TABLET ORAL 2 TIMES DAILY
Qty: 60 TABLET | Refills: 2 | Status: SHIPPED | OUTPATIENT
Start: 2024-11-04

## 2024-11-04 NOTE — TELEPHONE ENCOUNTER
Rx Refill Request     Name: Brandt Hdz  :  1970   Medication Name:  lamoTRIgine (LaMICtal) 200 mg tablet   Specific Pharmacy location:  [facility] [address] [place], [place]   Date of last appointment:  2024   Date of next appointment:  Visit date not found   Best number to reach patient:  [phone]

## 2024-11-12 ENCOUNTER — APPOINTMENT (OUTPATIENT)
Dept: PEDIATRIC CARDIOLOGY | Facility: CLINIC | Age: 54
End: 2024-11-12
Payer: COMMERCIAL

## 2024-11-12 VITALS
DIASTOLIC BLOOD PRESSURE: 76 MMHG | SYSTOLIC BLOOD PRESSURE: 145 MMHG | HEART RATE: 91 BPM | OXYGEN SATURATION: 96 % | RESPIRATION RATE: 16 BRPM | BODY MASS INDEX: 26.71 KG/M2 | TEMPERATURE: 96.8 F | WEIGHT: 166.23 LBS | HEIGHT: 66 IN

## 2024-11-12 DIAGNOSIS — Q25.0 PDA (PATENT DUCTUS ARTERIOSUS) (HHS-HCC): Primary | ICD-10-CM

## 2024-11-12 DIAGNOSIS — R06.02 SHORTNESS OF BREATH: ICD-10-CM

## 2024-11-12 DIAGNOSIS — I50.32 HEART FAILURE WITH RECOVERED EJECTION FRACTION (HFRECEF): ICD-10-CM

## 2024-11-12 PROCEDURE — 3077F SYST BP >= 140 MM HG: CPT | Performed by: INTERNAL MEDICINE

## 2024-11-12 PROCEDURE — 99213 OFFICE O/P EST LOW 20 MIN: CPT | Performed by: INTERNAL MEDICINE

## 2024-11-12 PROCEDURE — 3046F HEMOGLOBIN A1C LEVEL >9.0%: CPT | Performed by: INTERNAL MEDICINE

## 2024-11-12 PROCEDURE — 4010F ACE/ARB THERAPY RXD/TAKEN: CPT | Performed by: INTERNAL MEDICINE

## 2024-11-12 PROCEDURE — 3008F BODY MASS INDEX DOCD: CPT | Performed by: INTERNAL MEDICINE

## 2024-11-12 PROCEDURE — 3049F LDL-C 100-129 MG/DL: CPT | Performed by: INTERNAL MEDICINE

## 2024-11-12 PROCEDURE — 3078F DIAST BP <80 MM HG: CPT | Performed by: INTERNAL MEDICINE

## 2024-11-12 NOTE — PATIENT INSTRUCTIONS
No change in medications.  I placed referral to general cardiology for your heart failure related to a heart that is too stiff.      No routine follow up needed with Adult Congenital Heart team, but please contact us if you have any questions or concerns.    Recommendations:   Endocarditis prophylaxis:  You need antibiotics before dental cleanings and procedures. Call us if you need a prescription sent to your pharmacy. Please see the dentist every 6 months for a teeth cleaning.     [facility] program contact information:  [facility] Nurse line: [phone]  [phone]  After hours: call [phone] to page on-call pediatric cardiology fellow at [phone]  Email: [email or web address]  [employer]  **If your pharmacy has any problems requesting refills from us for your cardiac medications, please contact us. Please notify us at least 2 business days before the refill is needed.

## 2024-11-17 DIAGNOSIS — I10 PRIMARY HYPERTENSION: ICD-10-CM

## 2024-11-17 DIAGNOSIS — E11.65 TYPE 2 DIABETES MELLITUS WITH HYPERGLYCEMIA, WITHOUT LONG-TERM CURRENT USE OF INSULIN: ICD-10-CM

## 2024-11-18 ENCOUNTER — HOSPITAL ENCOUNTER (OUTPATIENT)
Dept: CARDIOLOGY | Facility: HOSPITAL | Age: 54
Discharge: HOME | End: 2024-11-18
Payer: MEDICARE

## 2024-11-18 DIAGNOSIS — Z95.0 CARDIAC PACEMAKER IN SITU: ICD-10-CM

## 2024-11-18 DIAGNOSIS — I44.2 ATRIOVENTRICULAR BLOCK, COMPLETE (MULTI): ICD-10-CM

## 2024-11-18 PROCEDURE — 93294 REM INTERROG EVL PM/LDLS PM: CPT | Performed by: INTERNAL MEDICINE

## 2024-11-18 PROCEDURE — 93296 REM INTERROG EVL PM/IDS: CPT

## 2024-11-18 RX ORDER — ATORVASTATIN CALCIUM 10 MG/1
10 TABLET, FILM COATED ORAL DAILY
Qty: 90 TABLET | Refills: 0 | Status: SHIPPED | OUTPATIENT
Start: 2024-11-18 | End: 2025-12-23

## 2024-11-18 RX ORDER — SITAGLIPTIN 100 MG/1
100 TABLET, FILM COATED ORAL DAILY
Qty: 90 TABLET | Refills: 0 | Status: SHIPPED | OUTPATIENT
Start: 2024-11-18

## 2024-11-18 NOTE — TELEPHONE ENCOUNTER
Rx Refill Request     Name: Brandt Hdz  :  1970   Medication Name:  atorvastatin (Lipitor) 10 mg tablet, SITagliptin phosphate (Januvia) 100 mg tablet   Specific Pharmacy location:  [facility] [id number] - [place], [place] - [address] [place] [address]   Date of last appointment:  Visit date not found   Date of next appointment:  Visit date not found   Best number to reach patient:  [phone]

## 2024-11-21 DIAGNOSIS — I10 PRIMARY HYPERTENSION: Primary | ICD-10-CM

## 2024-11-21 RX ORDER — PROPRANOLOL HYDROCHLORIDE 40 MG/1
40 TABLET ORAL 3 TIMES DAILY
Qty: 90 TABLET | Refills: 2 | Status: SHIPPED | OUTPATIENT
Start: 2024-11-21

## 2024-11-27 ENCOUNTER — APPOINTMENT (OUTPATIENT)
Dept: BEHAVIORAL HEALTH | Facility: CLINIC | Age: 54
End: 2024-11-27
Payer: MEDICARE

## 2024-11-27 DIAGNOSIS — F33.1 MODERATE RECURRENT MAJOR DEPRESSION: ICD-10-CM

## 2024-11-27 DIAGNOSIS — F31.81 BIPOLAR II DISORDER (MULTI): ICD-10-CM

## 2024-11-27 DIAGNOSIS — F41.1 GAD (GENERALIZED ANXIETY DISORDER): ICD-10-CM

## 2024-11-27 PROCEDURE — 3046F HEMOGLOBIN A1C LEVEL >9.0%: CPT | Performed by: CLINICAL NURSE SPECIALIST

## 2024-11-27 PROCEDURE — 3049F LDL-C 100-129 MG/DL: CPT | Performed by: CLINICAL NURSE SPECIALIST

## 2024-11-27 PROCEDURE — 4010F ACE/ARB THERAPY RXD/TAKEN: CPT | Performed by: CLINICAL NURSE SPECIALIST

## 2024-11-27 PROCEDURE — 99214 OFFICE O/P EST MOD 30 MIN: CPT | Performed by: CLINICAL NURSE SPECIALIST

## 2024-11-27 NOTE — PROGRESS NOTES
Outpatient Psychiatry      Subjective   Brandt Hdz, a 53 y.o. male,presents for a virtual audio and video appointment, he was at home for this appointment  Patient is referred by Mani Fortune MD .    Virtual or Telephone Consent     An interactive audio and video telecommunication system which permits real time communications between the patient (at the originating site) and provider (at the distant site) was utilized to provide this telehealth service.   Verbal consent was requested and obtained from Brandt Hdz on this date, 11/27/24 for a telehealth visit.       Diagnoses : recurrent major depressive disorder moderate F33.1  Generalized anxiety disorder F 41.1 moderate  Bipolar II disorder mild F 31.81     Problem List         Patient Active Problem List   Diagnosis    Atrioventricular block, complete (Multi)    Biceps tendonitis    Cardiac pacemaker    Cardiomyopathy (Multi)    CHD (congenital heart disease) ([facility])    Central pain syndrome    Cognitive deficits as late effect of cerebrovascular disease    Chronic fatigue    Complaint of memory disorder without observed objective memory deficit    Congenital heart block ([facility])    Congenital heart failure (Multi)    Current every day smoker    Decreased coordination    Stress reaction, chronic    Depression, major, in partial remission ([facility])    Dyspnea on exertion    Erectile dysfunction    Gait, antalgic    Gastroesophageal reflux disease without esophagitis    Primary hypertension    Hyperhidrosis of soles    Hyperkeratosis    Left ventricular systolic dysfunction    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)    Low testosterone    Neuropathy    PDA (patent ductus arteriosus) ([facility])    Polyp of colon    Rotator cuff syndrome of right shoulder    Sarcoidosis    Paroxysmal atrial fibrillation (Multi)    Sinus node dysfunction (Multi)    RAD (reactive airway disease) ([facility])    Sleep apnea    Sleep  disturbance    Stroke (Multi)    Type 2 diabetes mellitus with hyperglycemia, without long-term current use of insulin (Multi)    Verruca plana    Xerosis of skin    Acute hepatitis C virus infection without hepatic coma    Attention deficit hyperactivity disorder, inattentive type    Congenital nystagmus    Decreased strength    Major depressive disorder, recurrent severe without psychotic features (Multi)    Insomnia    Neuropathic pain    Prediabetes    Pseudophakia    PVT (portal vein thrombosis)    Refractive error    Regular astigmatism of both eyes    Diabetes mellitus without complication (Multi)    Unsteady gait    Visual impairment    Word finding difficulty    Chronic painful diabetic neuropathy (Multi)    Acute bronchitis due to other specified organisms    Chronic cough    High risk medication use    BMI 29.0-29.9,adult    Encounter for medication review and counseling    Encounter to discuss treatment options    Partial seizure disorder (Multi)    Focal epilepsy (Multi)    Left ventricular systolic dysfunction (LVSD)    Obesity    Keratosis    Obesity with body mass index 30 or greater    Primary focal hyperhidrosis of soles    Antalgic gait    Gastroesophageal reflux disease    Smokes tobacco daily    Impaired cognition    Headache    Fatigue    Hyperglycemia    Disorder of rotator cuff    Amnesia    Cognitive deficit due to and not concurrent with cerebrovascular disease    Acute prostatitis    Attention deficit hyperactivity disorder (ADHD), predominantly inattentive type    Decreased testosterone level    Atrial fibrillation (Multi)    Hyperplastic polyp of large intestine    Hypertension    Portal vein thrombosis    Reactive airway disease ([facility])    Disturbance in sleep behavior    Disorder of refraction    Reaction to chronic stress    Depressive disorder    Type 2 diabetes mellitus (Multi)    Asteatosis cutis    Abnormal gait    Bipolar II disorder (Multi)    Mild vascular dementia without  behavioral disturbance, psychotic disturbance, mood disturbance, or anxiety (Multi)    Heart failure with recovered ejection fraction (HFrecEF) (Multi)         Treatment Plan/Recommendations:   Follow-up plan for depression was discussed with patient.  Follow up in 4-6 weeks   Can call  for treatment and scheduling concerns   Can continue self care and wellness efforts and maintain routine health screenings  Continue bupropion xl 300 mg , daily each morning for depression   Continue hydoxyzine for anxiety 25 mg , three times a day as needed , reduce use at since is sleeping too much , at the next appointment work towards reducing the dose   Continue vraylar 6 mg at bedtime for depression     On lamotrigine for central nerve pain per neurology , this can also help with bipolar II disorder , 200 mg twice a day    Continue bupropion xl 300 mg , daily for depression      Review with patient: Treatment plan reviewed with the patient.  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   He used to enjoy reading , mainly comedies   He feels like he has some support in his life , he has 2 older brothers   He is in [place] and has friends in [place]  He goes to meetings for aa every night , he gets a ride   He says he gets nervous at meetings , still pushes himself to go   He has been sober for 8 years    He  says his short term memory and long term memory are poor , and he mentioned he is still able to take care of himself   He usually remembers to take his medications , he does not use pill boxes says he can forget to take them once a week   He was seeing a psychiatrist at [facility] in [place] , he was seen there for a year or two he does not remember much about that place he says    He has been on bupropion for a year or 2 ago when he was really depressed and anxious , he says he gets  very nervous and shaky inside   He has been on vraylar since nov 2023 and this has helped some   He feels the need to leave where he is when he gets anxious   He takes propranolol as needed for anxiety and he also takes hydroxyzine as needed for anxiety   He has been on lamotrigine for central nerve pain per neurology   He is diagnosed with bipolar 2 disorder , he had a manic episode a long time ago , he does not remember when   Has a history of psych hospitalization in 2012 for suicidal ideation   [facility]  Previous psych meds :  Depakote : does not remember the response   Remeron : too sedating   Zoloft has been taken , he says he did well on this   Prozac : does not remember the response    He sleeps about 9-10 hours a night , he naps twice a day for an hour each time   Encouraged to cut back on hydroxyzine and maintain a sleep wake cycle   Support provided for stressors      Psych ros and medical ros as noted above             Patient Active Problem List   Diagnosis    Atrioventricular block, complete (Multi)    Biceps tendonitis    Cardiac pacemaker    Cardiomyopathy    CHD (congenital heart disease)    Central pain syndrome    Cognitive deficits as late effect of cerebrovascular disease    Chronic fatigue    Complaint of memory disorder without observed objective memory deficit    Congenital heart block    Congenital heart failure    Current every day smoker    Decreased coordination    Stress reaction, chronic    Depression, major, in partial remission ([facility])    Dyspnea on exertion    Erectile dysfunction    Gait, antalgic    Gastroesophageal reflux disease without esophagitis    Primary hypertension    Hyperhidrosis of soles    Hyperkeratosis    Left ventricular systolic dysfunction    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)    Low testosterone    Neuropathy    PDA (patent ductus arteriosus) ([facility])    Polyp of colon    Rotator cuff  syndrome of right shoulder    Sarcoidosis    Paroxysmal atrial fibrillation (Multi)    Sinus node dysfunction (Multi)    RAD (reactive airway disease) ([facility])    Sleep apnea    Sleep disturbance    Stroke (Multi)    Type 2 diabetes mellitus with hyperglycemia, without long-term current use of insulin    Verruca plana    Xerosis of skin    Acute hepatitis C virus infection without hepatic coma    Attention deficit hyperactivity disorder, inattentive type    Congenital nystagmus    Decreased strength    Major depressive disorder, recurrent severe without psychotic features (Multi)    Insomnia    Neuropathic pain    Prediabetes    Pseudophakia    PVT (portal vein thrombosis)    Refractive error    Regular astigmatism of both eyes    Diabetes mellitus without complication (Multi)    Unsteady gait    Visual impairment    Word finding difficulty    Chronic painful diabetic neuropathy (Multi)    Acute bronchitis due to other specified organisms    Chronic cough    High risk medication use    BMI 29.0-29.9,adult    Encounter for medication review and counseling    Encounter to discuss treatment options    Partial seizure disorder (Multi)    Focal epilepsy (Multi)    Left ventricular systolic dysfunction (LVSD)    Obesity    Keratosis    Obesity with body mass index 30 or greater    Primary focal hyperhidrosis of soles    Antalgic gait    Gastroesophageal reflux disease    Smokes tobacco daily    Impaired cognition    Headache    Fatigue    Hyperglycemia    Disorder of rotator cuff    Amnesia    Cognitive deficit due to and not concurrent with cerebrovascular disease    Acute prostatitis    Attention deficit hyperactivity disorder (ADHD), predominantly inattentive type    Decreased testosterone level    Atrial fibrillation (Multi)    Hyperplastic polyp of large intestine    Hypertension    Portal vein thrombosis    Reactive airway disease ([facility])    Disturbance in sleep behavior    Disorder of refraction    Reaction to  chronic stress    Depressive disorder    Type 2 diabetes mellitus    Asteatosis cutis    Abnormal gait    Bipolar II disorder (Multi)    Mild vascular dementia without behavioral disturbance, psychotic disturbance, mood disturbance, or anxiety    Heart failure with recovered ejection fraction (HFrecEF)     Current Outpatient Medications:     albuterol (Proventil HFA) 90 mcg/actuation inhaler, Inhale 2 puffs every 4 hours if needed for wheezing or shortness of breath., Disp: 18 g, Rfl: 0    atorvastatin (Lipitor) 10 mg tablet, Take 1 tablet (10 mg) by mouth once daily., Disp: 90 tablet, Rfl: 0    buPROPion XL (Wellbutrin XL) 300 mg 24 hr tablet, Take 1 tablet (300 mg) by mouth once daily in the morning. Take before meals., Disp: 90 tablet, Rfl: 0    cariprazine (Vraylar) 6 mg capsule, Take 1 capsule (6 mg) by mouth once daily. Resending script , please notify patient when this is ready for  . He reports script not received yet . This is an increased dose and replaces order for 4.5 mg daily, Disp: 90 capsule, Rfl: 0    Eliquis 5 mg tablet, Take 1 tablet (5 mg) by mouth 2 times a day., Disp: 180 tablet, Rfl: 1    empagliflozin (Jardiance) 10 mg, Take 1 tablet (10 mg) by mouth once daily., Disp: 30 tablet, Rfl: 11    furosemide (Lasix) 20 mg tablet, TAKE 1 TABLET BY MOUTH DAILY, Disp: 90 tablet, Rfl: 3    gabapentin (Neurontin) 400 mg capsule, Take 1 capsule (400 mg) by mouth 2 times a day., Disp: 180 capsule, Rfl: 1    hydrOXYzine HCL (Atarax) 25 mg tablet, Take 1 tablet (25 mg) by mouth 3 times a day. prn, Disp: 60 tablet, Rfl: 2    Januvia 100 mg tablet, Take 1 tablet (100 mg) by mouth once daily., Disp: 90 tablet, Rfl: 0    lamoTRIgine (LaMICtal) 200 mg tablet, TAKE 1 TABLET BY MOUTH TWICE DAILY, Disp: 60 tablet, Rfl: 2    melatonin 5 mg capsule, Take 1 capsule (5 mg) by mouth once daily at bedtime. Takes 1-2 capsules each night at bedtime, Disp: , Rfl:     metFORMIN (Glucophage) 500 mg tablet, Take 1 tablet  (500 mg) by mouth once daily., Disp: 90 tablet, Rfl: 3    propranolol (Inderal) 40 mg tablet, Take 1 tablet (40 mg) by mouth 3 times a day., Disp: 90 tablet, Rfl: 2    tadalafil (Cialis) 20 mg tablet, Take 1 tablet (20 mg) by mouth once daily as needed for erectile dysfunction., Disp: 10 tablet, Rfl: 2    tamsulosin (Flomax) 0.4 mg 24 hr capsule, Take 1 capsule (0.4 mg) by mouth once daily., Disp: 30 capsule, Rfl: 11    telmisartan (Micardis) 20 mg tablet, Take 1 tablet (20 mg) by mouth once daily., Disp: 30 tablet, Rfl: 11  Medical History:  Past Medical History:   Diagnosis Date    Acute bronchitis 02/08/2023    Atrioventricular block, complete (Multi) 07/12/2022    Atrioventricular block, complete    Cough 02/08/2023    Essential (primary) hypertension 05/24/2022    HTN (hypertension)    Foot pain, bilateral 02/08/2023    Hemiplegia, unspecified affecting left nondominant side (Multi) 08/20/2020    Left hemiparesis    Hemiplegia, unspecified affecting left nondominant side (Multi) 08/20/2020    Left hemiparesis    Hemiplegia, unspecified affecting left nondominant side (Multi) 08/20/2020    Left hemiparesis    Hemiplegia, unspecified affecting left nondominant side (Multi) 08/20/2020    Left hemiparesis    History of bipolar disorder 11/01/2019    Last Assessment & Plan: Formatting of this note might be different from the original. Assessment: Stable at this time On multiple medications Follows with Psychiatry    History of sick sinus syndrome 11/01/2019    History of stroke 01/03/2020    Obstructive and reflux uropathy, unspecified 04/07/2017    Obstructive uropathy    Onychomycosis 02/08/2023    Other conditions influencing health status 08/20/2020    Stroke syndrome    Other sleep disorders 03/28/2014    Sleep paralysis    Personal history of (corrected) congenital malformations of heart and circulatory system 01/25/2022    History of congenital heart block    Personal history of colonic polyps 07/20/2020     Personal history of colonic polyps    Personal history of other endocrine, nutritional and metabolic disease 09/15/2015    History of hyperlipidemia    Personal history of other specified conditions 09/03/2020    History of memory loss    Presence of cardiac pacemaker 07/12/2022    Cardiac pacemaker    Unspecified viral hepatitis C without hepatic coma 07/21/2016    Viral hepatitis C without hepatic coma    URI (upper respiratory infection) 02/08/2023     Surgical History:  Past Surgical History:   Procedure Laterality Date    APPENDECTOMY      APPENDECTOMY  04/27/2018    Appendectomy    CARDIAC PACEMAKER PLACEMENT  07/09/2018    Pacemaker Placement    MEDIASTINAL MASS EXCISION      OTHER SURGICAL HISTORY  08/24/2020    Colonoscopy complete for polypectomy    OTHER SURGICAL HISTORY  07/20/2020    Colonoscopy    OTHER SURGICAL HISTORY  04/07/2017    Patent Ductus Arteriosus Repair    OTHER SURGICAL HISTORY  04/07/2017    Mediastinoscopy With Biopsy Of Mediastinal Mass    PATENT DUCTUS ARTERIOUS LIGATION       Family History:  Family History   Problem Relation Name Age of Onset    Stroke Mother      Cancer Mother      Other (cardiac disorder) Mother      Lymphoma Mother      Lung cancer Mother      Colon cancer Mother      Liver cancer Mother      Colon cancer Father      Diabetes Father      Stroke Father      Other (Other) Father      Cancer Father      Ulcerative colitis Brother       Social History:  Social History     Socioeconomic History    Marital status:      Spouse name: Not on file    Number of children: Not on file    Years of education: Not on file    Highest education level: Not on file   Occupational History    Not on file   Tobacco Use    Smoking status: Every Day     Current packs/day: 1.00     Types: Cigarettes     Passive exposure: Never    Smokeless tobacco: Former     Types: Chew   Vaping Use    Vaping status: Every Day   Substance and Sexual Activity    Alcohol use: Never    Drug use:  Never    Sexual activity: Defer   Other Topics Concern    Not on file   Social History Narrative    Not on file     Social Drivers of Health     Financial Resource Strain: Not on file   Food Insecurity: No Food Insecurity (11/15/2023)    Received from [facility]    [facility] Food     Brief social Worry about food: Not on file     Brief social Food run out: Not on file   Transportation Needs: No Transportation Needs (11/15/2023)    Received from [facility]    [facility] Transportation     Brief social Transport to appt: Not on file     Brief social Transport to work: Not on file   Physical Activity: Not on file   Stress: Not on file   Social Connections: Not on file   Intimate Partner Violence: Not on file   Housing Stability: Low Risk  (11/15/2023)    Received from [facility]    [facility] Housing     Brief social Pay for mortgage/rent: Not on file     Brief social: Place to sleep: Not on file     Vitals:  There were no vitals filed for this visit.    Charmaine Irving, APRN-CNS

## 2024-12-19 ENCOUNTER — HOSPITAL ENCOUNTER (OUTPATIENT)
Dept: CARDIOLOGY | Facility: HOSPITAL | Age: 54
Discharge: HOME | End: 2024-12-19
Payer: MEDICARE

## 2024-12-19 DIAGNOSIS — I44.2 ATRIOVENTRICULAR BLOCK, COMPLETE (MULTI): ICD-10-CM

## 2024-12-19 DIAGNOSIS — Z95.0 CARDIAC PACEMAKER IN SITU: ICD-10-CM

## 2024-12-30 ENCOUNTER — HOSPITAL ENCOUNTER (OUTPATIENT)
Dept: CARDIOLOGY | Facility: HOSPITAL | Age: 54
Discharge: HOME | End: 2024-12-30
Payer: MEDICARE

## 2024-12-30 DIAGNOSIS — I44.2 ATRIOVENTRICULAR BLOCK, COMPLETE (MULTI): ICD-10-CM

## 2024-12-30 DIAGNOSIS — Z95.0 CARDIAC PACEMAKER IN SITU: ICD-10-CM

## 2025-01-06 ENCOUNTER — APPOINTMENT (OUTPATIENT)
Dept: PRIMARY CARE | Facility: CLINIC | Age: 55
End: 2025-01-06
Payer: COMMERCIAL

## 2025-01-06 VITALS
SYSTOLIC BLOOD PRESSURE: 129 MMHG | BODY MASS INDEX: 28.22 KG/M2 | HEIGHT: 66 IN | OXYGEN SATURATION: 98 % | HEART RATE: 92 BPM | DIASTOLIC BLOOD PRESSURE: 83 MMHG | WEIGHT: 175.6 LBS

## 2025-01-06 DIAGNOSIS — I10 PRIMARY HYPERTENSION: ICD-10-CM

## 2025-01-06 DIAGNOSIS — F17.200 CURRENT EVERY DAY SMOKER: ICD-10-CM

## 2025-01-06 DIAGNOSIS — F01.A0 MILD VASCULAR DEMENTIA WITHOUT BEHAVIORAL DISTURBANCE, PSYCHOTIC DISTURBANCE, MOOD DISTURBANCE, OR ANXIETY: Primary | ICD-10-CM

## 2025-01-06 DIAGNOSIS — F33.2 MAJOR DEPRESSIVE DISORDER, RECURRENT SEVERE WITHOUT PSYCHOTIC FEATURES (MULTI): ICD-10-CM

## 2025-01-06 DIAGNOSIS — F90.0 ATTENTION DEFICIT HYPERACTIVITY DISORDER (ADHD), PREDOMINANTLY INATTENTIVE TYPE: ICD-10-CM

## 2025-01-06 DIAGNOSIS — G62.9 NEUROPATHY: ICD-10-CM

## 2025-01-06 DIAGNOSIS — I48.0 PAROXYSMAL ATRIAL FIBRILLATION (MULTI): ICD-10-CM

## 2025-01-06 PROCEDURE — 99214 OFFICE O/P EST MOD 30 MIN: CPT | Performed by: FAMILY MEDICINE

## 2025-01-06 PROCEDURE — 3074F SYST BP LT 130 MM HG: CPT | Performed by: FAMILY MEDICINE

## 2025-01-06 PROCEDURE — 3008F BODY MASS INDEX DOCD: CPT | Performed by: FAMILY MEDICINE

## 2025-01-06 PROCEDURE — 4010F ACE/ARB THERAPY RXD/TAKEN: CPT | Performed by: FAMILY MEDICINE

## 2025-01-06 PROCEDURE — 3079F DIAST BP 80-89 MM HG: CPT | Performed by: FAMILY MEDICINE

## 2025-01-06 RX ORDER — METHYLPREDNISOLONE 4 MG/1
TABLET ORAL
Qty: 21 TABLET | Refills: 0 | Status: SHIPPED | OUTPATIENT
Start: 2025-01-06

## 2025-01-06 ASSESSMENT — ENCOUNTER SYMPTOMS
COUGH: 0
HEADACHES: 0
DYSPHORIC MOOD: 0
DIARRHEA: 0
ARTHRALGIAS: 0
DIZZINESS: 0
BACK PAIN: 0
FATIGUE: 0
NAUSEA: 0
SORE THROAT: 0
DIFFICULTY URINATING: 0
CHEST TIGHTNESS: 0
NECK PAIN: 0
VOMITING: 0
EYE DISCHARGE: 0
SHORTNESS OF BREATH: 0
NERVOUS/ANXIOUS: 0
BLOOD IN STOOL: 0
ACTIVITY CHANGE: 0
ABDOMINAL PAIN: 0
HEMATURIA: 0
WEAKNESS: 0
ADENOPATHY: 0
CONSTIPATION: 0
BRUISES/BLEEDS EASILY: 0
MYALGIAS: 0
NUMBNESS: 0

## 2025-01-06 NOTE — PROGRESS NOTES
"Subjective   Patient ID: Brandt Hdz is a 54 y.o. male who presents for Arm Pain.  HPI    Left arm pain  Numbness and tingling laterally  No trauma or injury  Present for several weeks  No previous issues  He is concerned about the possibility of a pinched nerve which is realistic  No trauma or injury  No anginal symptoms    A-fib stable keep cardiac follow-up    Vascular dementia stable no progression or worsening    He smokes and should quit    ADD stable    Depressed mood ongoing he sees specialist and counselors    Hypertension stable no chest pain or shortness of breath      Review of Systems   Constitutional:  Negative for activity change and fatigue.   HENT:  Negative for congestion and sore throat.    Eyes:  Negative for discharge.   Respiratory:  Negative for cough, chest tightness and shortness of breath.    Cardiovascular:  Negative for chest pain and leg swelling.   Gastrointestinal:  Negative for abdominal pain, blood in stool, constipation, diarrhea, nausea and vomiting.   Endocrine: Negative for cold intolerance and heat intolerance.   Genitourinary:  Negative for difficulty urinating and hematuria.   Musculoskeletal:  Negative for arthralgias, back pain, gait problem, myalgias and neck pain.   Allergic/Immunologic: Negative for environmental allergies.   Neurological:  Negative for dizziness, syncope, weakness, numbness and headaches.   Hematological:  Negative for adenopathy. Does not bruise/bleed easily.   Psychiatric/Behavioral:  Negative for dysphoric mood. The patient is not nervous/anxious.    All other systems reviewed and are negative.      Objective   /83 (BP Location: Right arm, BP Cuff Size: Large adult)   Pulse 92   Ht 1.676 m (5' 6\")   Wt 79.7 kg (175 lb 9.6 oz)   SpO2 98%   BMI 28.34 kg/m²    Physical Exam  Vitals and nursing note reviewed.   Constitutional:       General: He is not in acute distress.     Appearance: Normal appearance.   HENT:      Head: Normocephalic and " atraumatic.      Right Ear: Tympanic membrane, ear canal and external ear normal.      Left Ear: Tympanic membrane, ear canal and external ear normal.      Nose: Nose normal.      Mouth/Throat:      Mouth: Mucous membranes are moist.      Pharynx: Oropharynx is clear. No oropharyngeal exudate or posterior oropharyngeal erythema.   Eyes:      Extraocular Movements: Extraocular movements intact.      Conjunctiva/sclera: Conjunctivae normal.      Pupils: Pupils are equal, round, and reactive to light.   Cardiovascular:      Rate and Rhythm: Normal rate and regular rhythm.      Pulses: Normal pulses.      Heart sounds: Normal heart sounds. No murmur heard.  Pulmonary:      Effort: Pulmonary effort is normal. No respiratory distress.      Breath sounds: Normal breath sounds. No wheezing or rales.   Abdominal:      General: Abdomen is flat. Bowel sounds are normal. There is no distension.      Palpations: Abdomen is soft. There is no mass.      Tenderness: There is no abdominal tenderness.   Musculoskeletal:         General: No swelling or deformity. Normal range of motion.      Cervical back: Normal range of motion and neck supple.      Right lower leg: No edema.      Left lower leg: No edema.   Lymphadenopathy:      Cervical: No cervical adenopathy.   Skin:     General: Skin is warm and dry.      Capillary Refill: Capillary refill takes less than 2 seconds.      Findings: No lesion or rash.   Neurological:      General: No focal deficit present.      Mental Status: He is alert and oriented to person, place, and time.      Cranial Nerves: No cranial nerve deficit.      Motor: No weakness.   Psychiatric:         Mood and Affect: Mood normal.         Behavior: Behavior normal.         Thought Content: Thought content normal.         Judgment: Judgment normal.       Assessment/Plan   Problem List Items Addressed This Visit       Current every day smoker    Primary hypertension    Neuropathy    Relevant Medications     methylPREDNISolone (Medrol Dospak) 4 mg tablets    Other Relevant Orders    Follow Up In [facility] - PCP    Paroxysmal atrial fibrillation (Multi)    Major depressive disorder, recurrent severe without psychotic features (Multi)    Attention deficit hyperactivity disorder (ADHD), predominantly inattentive type    Mild vascular dementia without behavioral disturbance, psychotic disturbance, mood disturbance, or anxiety - Primary       Patient education provided.  Stay current with age appropriate health maintenance as instructed.  Appointment here or ER with new or worsening symptoms'  Keep appropriate follow-up visit.  Stay current with proper immunizations   Offered imaging or EMG  He prefers trial of steroids and then reevaluate if persistent and we will proceed with further diagnostics

## 2025-01-09 ENCOUNTER — APPOINTMENT (OUTPATIENT)
Dept: BEHAVIORAL HEALTH | Facility: CLINIC | Age: 55
End: 2025-01-09
Payer: COMMERCIAL

## 2025-01-09 DIAGNOSIS — F31.81 BIPOLAR II DISORDER (MULTI): ICD-10-CM

## 2025-01-09 DIAGNOSIS — F33.1 MODERATE RECURRENT MAJOR DEPRESSION: ICD-10-CM

## 2025-01-09 DIAGNOSIS — F41.1 GAD (GENERALIZED ANXIETY DISORDER): ICD-10-CM

## 2025-01-09 PROCEDURE — 4010F ACE/ARB THERAPY RXD/TAKEN: CPT | Performed by: CLINICAL NURSE SPECIALIST

## 2025-01-09 PROCEDURE — 99213 OFFICE O/P EST LOW 20 MIN: CPT | Performed by: CLINICAL NURSE SPECIALIST

## 2025-01-09 RX ORDER — BUSPIRONE HYDROCHLORIDE 10 MG/1
10 TABLET ORAL 2 TIMES DAILY
Qty: 180 TABLET | Refills: 0 | Status: SHIPPED | OUTPATIENT
Start: 2025-01-09 | End: 2025-04-09

## 2025-01-09 NOTE — PROGRESS NOTES
Outpatient Psychiatry      Subjective   Brandt Hdz, a 54 y.o. male, presents for a virtual audio and video virtual appointment, he was at home for this appointment  Patient is referred by Mani Fortune MD .    Virtual or Telephone Consent     An interactive audio and video telecommunication system which permits real time communications between the patient (at the originating site) and provider (at the distant site) was utilized to provide this telehealth service.   Verbal consent was requested and obtained from Brandt Hdz on this date, 1/9/25 for a telehealth visit.       Diagnoses : recurrent major depressive disorder moderate F33.1  Generalized anxiety disorder F 41.1 moderate  Bipolar II disorder moderate F 31.81     Problem List         Patient Active Problem List   Diagnosis    Atrioventricular block, complete (Multi)    Biceps tendonitis    Cardiac pacemaker    Cardiomyopathy (Multi)    CHD (congenital heart disease) ([facility])    Central pain syndrome    Cognitive deficits as late effect of cerebrovascular disease    Chronic fatigue    Complaint of memory disorder without observed objective memory deficit    Congenital heart block ([facility])    Congenital heart failure (Multi)    Current every day smoker    Decreased coordination    Stress reaction, chronic    Depression, major, in partial remission ([facility])    Dyspnea on exertion    Erectile dysfunction    Gait, antalgic    Gastroesophageal reflux disease without esophagitis    Primary hypertension    Hyperhidrosis of soles    Hyperkeratosis    Left ventricular systolic dysfunction    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)    Low testosterone    Neuropathy    PDA (patent ductus arteriosus) ([facility])    Polyp of colon    Rotator cuff syndrome of right shoulder    Sarcoidosis    Paroxysmal atrial fibrillation (Multi)    Sinus node dysfunction (Multi)    RAD (reactive airway disease) ([facility])    Sleep apnea     Sleep disturbance    Stroke (Multi)    Type 2 diabetes mellitus with hyperglycemia, without long-term current use of insulin (Multi)    Verruca plana    Xerosis of skin    Acute hepatitis C virus infection without hepatic coma    Attention deficit hyperactivity disorder, inattentive type    Congenital nystagmus    Decreased strength    Major depressive disorder, recurrent severe without psychotic features (Multi)    Insomnia    Neuropathic pain    Prediabetes    Pseudophakia    PVT (portal vein thrombosis)    Refractive error    Regular astigmatism of both eyes    Diabetes mellitus without complication (Multi)    Unsteady gait    Visual impairment    Word finding difficulty    Chronic painful diabetic neuropathy (Multi)    Acute bronchitis due to other specified organisms    Chronic cough    High risk medication use    BMI 29.0-29.9,adult    Encounter for medication review and counseling    Encounter to discuss treatment options    Partial seizure disorder (Multi)    Focal epilepsy (Multi)    Left ventricular systolic dysfunction (LVSD)    Obesity    Keratosis    Obesity with body mass index 30 or greater    Primary focal hyperhidrosis of soles    Antalgic gait    Gastroesophageal reflux disease    Smokes tobacco daily    Impaired cognition    Headache    Fatigue    Hyperglycemia    Disorder of rotator cuff    Amnesia    Cognitive deficit due to and not concurrent with cerebrovascular disease    Acute prostatitis    Attention deficit hyperactivity disorder (ADHD), predominantly inattentive type    Decreased testosterone level    Atrial fibrillation (Multi)    Hyperplastic polyp of large intestine    Hypertension    Portal vein thrombosis    Reactive airway disease ([facility])    Disturbance in sleep behavior    Disorder of refraction    Reaction to chronic stress    Depressive disorder    Type 2 diabetes mellitus (Multi)    Asteatosis cutis    Abnormal gait    Bipolar II disorder (Multi)    Mild vascular dementia  without behavioral disturbance, psychotic disturbance, mood disturbance, or anxiety (Multi)    Heart failure with recovered ejection fraction (HFrecEF) (Multi)         Treatment Plan/Recommendations:   Follow-up plan for depression was discussed with patient.  Follow up in 4-6 weeks   Can call  for treatment and scheduling concerns   Can continue self care and wellness efforts and maintain routine health screenings  Psychotropic medications :  Continue bupropion xl 300 mg , daily each morning for depression   Continue hydoxyzine for anxiety 25 mg , three times a day as needed , reduce use at since is sleeping too much , at the next appointment work towards reducing the dose   Continue vraylar 6 mg at bedtime for depression     On lamotrigine for central nerve pain per neurology , this can also help with bipolar II disorder , 200 mg twice a day         Review with patient: Treatment plan reviewed with the patient.  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   He used to enjoy reading , mainly comedies   He feels like he has some support in his life , he has 2 older brothers   He is in aa and has friends in aa  He goes to meetings for aa every night , he gets a ride   He says he gets nervous at meetings , still pushes himself to go   He has been sober for 8 years    He  says his short term memory and long term memory are poor , and he mentioned he is still able to take care of himself   He usually remembers to take his medications , he does not use pill boxes says he can forget to take them once a week   He was seeing a psychiatrist at [facility] in [place] , he was seen there for a year or two he does not remember much about that place he says    He has been on bupropion for a year or 2 ago when he was really depressed and anxious , he says he gets very nervous and  shaky inside   He has been on vraylar since nov 2023 and this has helped some   He feels the need to leave where he is when he gets anxious   He takes propranolol as needed for anxiety and he also takes hydroxyzine as needed for anxiety   He has been on lamotrigine for central nerve pain per neurology   He is diagnosed with bipolar 2 disorder , he had a manic episode a long time ago , he does not remember when   Has a history of psych hospitalization in 2012 for suicidal ideation   [facility]  Previous psych meds :  Depakote : does not remember the response   Remeron : too sedating   Zoloft has been taken , he says he did well on this   Prozac : does not remember the response    He sleeps about 9-10 hours a night , he naps twice a day for an hour each time   Encouraged to cut back on hydroxyzine and maintain a sleep wake cycle   Support provided for stressors      Psych ros and medical ros as noted above             Patient Active Problem List   Diagnosis    Atrioventricular block, complete (Multi)    Biceps tendonitis    Cardiac pacemaker    Cardiomyopathy    CHD (congenital heart disease)    Central pain syndrome    Cognitive deficits as late effect of cerebrovascular disease    Chronic fatigue    Complaint of memory disorder without observed objective memory deficit    Congenital heart block    Congenital heart failure    Current every day smoker    Decreased coordination    Stress reaction, chronic    Depression, major, in partial remission ([facility])    Dyspnea on exertion    Erectile dysfunction    Gait, antalgic    Gastroesophageal reflux disease without esophagitis    Primary hypertension    Hyperhidrosis of soles    Hyperkeratosis    Left ventricular systolic dysfunction    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)    Low testosterone    Neuropathy    PDA (patent ductus arteriosus) ([facility])    Polyp of colon    Rotator cuff syndrome of right  shoulder    Sarcoidosis    Paroxysmal atrial fibrillation (Multi)    Sinus node dysfunction (Multi)    RAD (reactive airway disease) ([facility])    Sleep apnea    Sleep disturbance    Stroke (Multi)    Type 2 diabetes mellitus with hyperglycemia, without long-term current use of insulin    Verruca plana    Xerosis of skin    Acute hepatitis C virus infection without hepatic coma    Attention deficit hyperactivity disorder, inattentive type    Congenital nystagmus    Decreased strength    Major depressive disorder, recurrent severe without psychotic features (Multi)    Insomnia    Neuropathic pain    Prediabetes    Pseudophakia    PVT (portal vein thrombosis)    Refractive error    Regular astigmatism of both eyes    Diabetes mellitus without complication (Multi)    Unsteady gait    Visual impairment    Word finding difficulty    Chronic painful diabetic neuropathy (Multi)    Acute bronchitis due to other specified organisms    Chronic cough    High risk medication use    BMI 29.0-29.9,adult    Encounter for medication review and counseling    Encounter to discuss treatment options    Partial seizure disorder (Multi)    Focal epilepsy (Multi)    Left ventricular systolic dysfunction (LVSD)    Obesity    Keratosis    Obesity with body mass index 30 or greater    Primary focal hyperhidrosis of soles    Antalgic gait    Gastroesophageal reflux disease    Smokes tobacco daily    Impaired cognition    Headache    Fatigue    Hyperglycemia    Disorder of rotator cuff    Amnesia    Cognitive deficit due to and not concurrent with cerebrovascular disease    Acute prostatitis    Attention deficit hyperactivity disorder (ADHD), predominantly inattentive type    Decreased testosterone level    Atrial fibrillation (Multi)    Hyperplastic polyp of large intestine    Hypertension    Portal vein thrombosis    Reactive airway disease ([facility])    Disturbance in sleep behavior    Disorder of refraction    Reaction to chronic stress     Depressive disorder    Type 2 diabetes mellitus    Asteatosis cutis    Abnormal gait    Bipolar II disorder (Multi)    Mild vascular dementia without behavioral disturbance, psychotic disturbance, mood disturbance, or anxiety    Heart failure with recovered ejection fraction (HFrecEF)     Current Outpatient Medications:     albuterol (Proventil HFA) 90 mcg/actuation inhaler, Inhale 2 puffs every 4 hours if needed for wheezing or shortness of breath., Disp: 18 g, Rfl: 0    atorvastatin (Lipitor) 10 mg tablet, Take 1 tablet (10 mg) by mouth once daily., Disp: 90 tablet, Rfl: 0    buPROPion XL (Wellbutrin XL) 300 mg 24 hr tablet, Take 1 tablet (300 mg) by mouth once daily in the morning. Take before meals., Disp: 90 tablet, Rfl: 0    cariprazine (Vraylar) 6 mg capsule, Take 1 capsule (6 mg) by mouth once daily. Resending script , please notify patient when this is ready for  . He reports script not received yet . This is an increased dose and replaces order for 4.5 mg daily, Disp: 90 capsule, Rfl: 0    Eliquis 5 mg tablet, Take 1 tablet (5 mg) by mouth 2 times a day., Disp: 180 tablet, Rfl: 1    empagliflozin (Jardiance) 10 mg, Take 1 tablet (10 mg) by mouth once daily., Disp: 30 tablet, Rfl: 11    furosemide (Lasix) 20 mg tablet, TAKE 1 TABLET BY MOUTH DAILY, Disp: 90 tablet, Rfl: 3    gabapentin (Neurontin) 400 mg capsule, Take 1 capsule (400 mg) by mouth 2 times a day., Disp: 180 capsule, Rfl: 1    hydrOXYzine HCL (Atarax) 25 mg tablet, Take 1 tablet (25 mg) by mouth 3 times a day. prn, Disp: 60 tablet, Rfl: 2    Januvia 100 mg tablet, Take 1 tablet (100 mg) by mouth once daily., Disp: 90 tablet, Rfl: 0    lamoTRIgine (LaMICtal) 200 mg tablet, TAKE 1 TABLET BY MOUTH TWICE DAILY, Disp: 60 tablet, Rfl: 2    melatonin 5 mg capsule, Take 1 capsule (5 mg) by mouth once daily at bedtime. Takes 1-2 capsules each night at bedtime, Disp: , Rfl:     metFORMIN (Glucophage) 500 mg tablet, Take 1 tablet (500 mg) by mouth  once daily., Disp: 90 tablet, Rfl: 3    methylPREDNISolone (Medrol Dospak) 4 mg tablets, Take as directed on package., Disp: 21 tablet, Rfl: 0    propranolol (Inderal) 40 mg tablet, Take 1 tablet (40 mg) by mouth 3 times a day., Disp: 90 tablet, Rfl: 2    tadalafil (Cialis) 20 mg tablet, Take 1 tablet (20 mg) by mouth once daily as needed for erectile dysfunction., Disp: 10 tablet, Rfl: 2    tamsulosin (Flomax) 0.4 mg 24 hr capsule, Take 1 capsule (0.4 mg) by mouth once daily., Disp: 30 capsule, Rfl: 11    telmisartan (Micardis) 20 mg tablet, Take 1 tablet (20 mg) by mouth once daily., Disp: 30 tablet, Rfl: 11  Medical History:  Past Medical History:   Diagnosis Date    Acute bronchitis 02/08/2023    Atrioventricular block, complete (Multi) 07/12/2022    Atrioventricular block, complete    Cough 02/08/2023    Essential (primary) hypertension 05/24/2022    HTN (hypertension)    Foot pain, bilateral 02/08/2023    Hemiplegia, unspecified affecting left nondominant side (Multi) 08/20/2020    Left hemiparesis    Hemiplegia, unspecified affecting left nondominant side (Multi) 08/20/2020    Left hemiparesis    Hemiplegia, unspecified affecting left nondominant side (Multi) 08/20/2020    Left hemiparesis    Hemiplegia, unspecified affecting left nondominant side (Multi) 08/20/2020    Left hemiparesis    History of bipolar disorder 11/01/2019    Last Assessment & Plan: Formatting of this note might be different from the original. Assessment: Stable at this time On multiple medications Follows with Psychiatry    History of sick sinus syndrome 11/01/2019    History of stroke 01/03/2020    Obstructive and reflux uropathy, unspecified 04/07/2017    Obstructive uropathy    Onychomycosis 02/08/2023    Other conditions influencing health status 08/20/2020    Stroke syndrome    Other sleep disorders 03/28/2014    Sleep paralysis    Personal history of (corrected) congenital malformations of heart and circulatory system 01/25/2022     History of congenital heart block    Personal history of colonic polyps 07/20/2020    Personal history of colonic polyps    Personal history of other endocrine, nutritional and metabolic disease 09/15/2015    History of hyperlipidemia    Personal history of other specified conditions 09/03/2020    History of memory loss    Presence of cardiac pacemaker 07/12/2022    Cardiac pacemaker    Unspecified viral hepatitis C without hepatic coma 07/21/2016    Viral hepatitis C without hepatic coma    URI (upper respiratory infection) 02/08/2023     Surgical History:  Past Surgical History:   Procedure Laterality Date    APPENDECTOMY      APPENDECTOMY  04/27/2018    Appendectomy    CARDIAC PACEMAKER PLACEMENT  07/09/2018    Pacemaker Placement    MEDIASTINAL MASS EXCISION      OTHER SURGICAL HISTORY  08/24/2020    Colonoscopy complete for polypectomy    OTHER SURGICAL HISTORY  07/20/2020    Colonoscopy    OTHER SURGICAL HISTORY  04/07/2017    Patent Ductus Arteriosus Repair    OTHER SURGICAL HISTORY  04/07/2017    Mediastinoscopy With Biopsy Of Mediastinal Mass    PATENT DUCTUS ARTERIOUS LIGATION       Family History:  Family History   Problem Relation Name Age of Onset    Stroke Mother      Cancer Mother      Other (cardiac disorder) Mother      Lymphoma Mother      Lung cancer Mother      Colon cancer Mother      Liver cancer Mother      Colon cancer Father      Diabetes Father      Stroke Father      Other (Other) Father      Cancer Father      Ulcerative colitis Brother       Social History:  Social History     Socioeconomic History    Marital status:      Spouse name: Not on file    Number of children: Not on file    Years of education: Not on file    Highest education level: Not on file   Occupational History    Not on file   Tobacco Use    Smoking status: Every Day     Current packs/day: 1.00     Types: Cigarettes     Passive exposure: Never    Smokeless tobacco: Former     Types: Chew   Vaping Use    Vaping  status: Every Day   Substance and Sexual Activity    Alcohol use: Never    Drug use: Never    Sexual activity: Defer   Other Topics Concern    Not on file   Social History Narrative    Not on file     Social Drivers of Health     Financial Resource Strain: Not on file   Food Insecurity: No Food Insecurity (11/15/2023)    Received from [facility]    [facility] Brief Food     Brief social Worry about food: Not on file     Brief social Food run out: Not on file   Transportation Needs: No Transportation Needs (11/15/2023)    Received from [facility]    [facility] Transportation     Brief social Transport to appt: Not on file     Brief social Transport to work: Not on file   Physical Activity: Not on file   Stress: Not on file   Social Connections: Not on file   Intimate Partner Violence: Not on file   Housing Stability: Low Risk  (11/15/2023)    Received from [facility]    [facility] Housing     Brief social Pay for mortgage/rent: Not on file     Brief social: Place to sleep: Not on file     Vitals:  There were no vitals filed for this visit.    Charmaine Irving, APRN-CNS

## 2025-01-16 PROBLEM — F31.81 BIPOLAR II DISORDER (MULTI): Status: RESOLVED | Noted: 2024-04-18 | Resolved: 2025-01-16

## 2025-01-16 PROBLEM — I50.9 CONGENITAL HEART FAILURE: Status: RESOLVED | Noted: 2023-02-08 | Resolved: 2025-01-16

## 2025-01-16 PROBLEM — E11.40 CHRONIC PAINFUL DIABETIC NEUROPATHY (MULTI): Status: RESOLVED | Noted: 2023-04-25 | Resolved: 2025-01-16

## 2025-01-16 PROBLEM — G40.109 FOCAL EPILEPSY (MULTI): Status: RESOLVED | Noted: 2023-02-08 | Resolved: 2025-01-16

## 2025-01-16 PROBLEM — G40.109 PARTIAL SEIZURE DISORDER (MULTI): Status: RESOLVED | Noted: 2024-02-28 | Resolved: 2025-01-16

## 2025-01-16 PROBLEM — I69.354 HEMIPLEGIA AND HEMIPARESIS FOLLOWING CEREBRAL INFARCTION AFFECTING LEFT NON-DOMINANT SIDE (MULTI): Status: ACTIVE | Noted: 2025-01-16

## 2025-01-16 PROBLEM — G40.109 LOCALIZATION-RELATED FOCAL EPILEPSY WITH SIMPLE PARTIAL SEIZURES (MULTI): Status: RESOLVED | Noted: 2023-02-08 | Resolved: 2025-01-16

## 2025-01-16 PROBLEM — I50.32 HEART FAILURE WITH RECOVERED EJECTION FRACTION (HFRECEF): Status: RESOLVED | Noted: 2024-07-29 | Resolved: 2025-01-16

## 2025-01-20 PROBLEM — J45.909 RAD (REACTIVE AIRWAY DISEASE) (HHS-HCC): Status: RESOLVED | Noted: 2023-02-08 | Resolved: 2025-01-20

## 2025-01-20 PROBLEM — Q25.0 PDA (PATENT DUCTUS ARTERIOSUS) (HHS-HCC): Status: RESOLVED | Noted: 2023-02-08 | Resolved: 2025-01-20

## 2025-01-20 PROBLEM — F32.4 DEPRESSION, MAJOR, IN PARTIAL REMISSION (CMS-HCC): Status: RESOLVED | Noted: 2023-02-08 | Resolved: 2025-01-20

## 2025-01-21 ENCOUNTER — OFFICE VISIT (OUTPATIENT)
Dept: PRIMARY CARE | Facility: CLINIC | Age: 55
End: 2025-01-21
Payer: COMMERCIAL

## 2025-01-21 VITALS
BODY MASS INDEX: 27.97 KG/M2 | WEIGHT: 174 LBS | OXYGEN SATURATION: 96 % | HEART RATE: 87 BPM | SYSTOLIC BLOOD PRESSURE: 115 MMHG | DIASTOLIC BLOOD PRESSURE: 76 MMHG | HEIGHT: 66 IN

## 2025-01-21 DIAGNOSIS — J20.9 ACUTE BRONCHITIS, UNSPECIFIED ORGANISM: Primary | ICD-10-CM

## 2025-01-21 DIAGNOSIS — I50.32 HEART FAILURE WITH RECOVERED EJECTION FRACTION (HFRECEF): ICD-10-CM

## 2025-01-21 PROCEDURE — 4010F ACE/ARB THERAPY RXD/TAKEN: CPT | Performed by: FAMILY MEDICINE

## 2025-01-21 PROCEDURE — 3078F DIAST BP <80 MM HG: CPT | Performed by: FAMILY MEDICINE

## 2025-01-21 PROCEDURE — 3008F BODY MASS INDEX DOCD: CPT | Performed by: FAMILY MEDICINE

## 2025-01-21 PROCEDURE — 99213 OFFICE O/P EST LOW 20 MIN: CPT | Performed by: FAMILY MEDICINE

## 2025-01-21 PROCEDURE — 3074F SYST BP LT 130 MM HG: CPT | Performed by: FAMILY MEDICINE

## 2025-01-21 RX ORDER — METHYLPREDNISOLONE 4 MG/1
TABLET ORAL
Qty: 21 TABLET | Refills: 0 | Status: SHIPPED | OUTPATIENT
Start: 2025-01-21 | End: 2025-01-27

## 2025-01-21 RX ORDER — AZITHROMYCIN 250 MG/1
TABLET, FILM COATED ORAL
Qty: 6 TABLET | Refills: 0 | Status: SHIPPED | OUTPATIENT
Start: 2025-01-21 | End: 2025-01-26

## 2025-01-21 RX ORDER — PROMETHAZINE HYDROCHLORIDE AND DEXTROMETHORPHAN HYDROBROMIDE 6.25; 15 MG/5ML; MG/5ML
5 SYRUP ORAL 4 TIMES DAILY PRN
Qty: 120 ML | Refills: 1 | Status: SHIPPED | OUTPATIENT
Start: 2025-01-21 | End: 2025-02-20

## 2025-01-21 ASSESSMENT — ENCOUNTER SYMPTOMS
FATIGUE: 1
APPETITE CHANGE: 1
DIZZINESS: 0
EYE REDNESS: 0
SINUS PRESSURE: 1
COUGH: 1
DIARRHEA: 0
RHINORRHEA: 1
SHORTNESS OF BREATH: 1
FREQUENCY: 1
DYSPHORIC MOOD: 1
CHILLS: 1
MYALGIAS: 1
VOMITING: 0
DIFFICULTY URINATING: 0
NAUSEA: 0
EYE PAIN: 0
HEADACHES: 0
FEVER: 0
SORE THROAT: 0

## 2025-01-21 NOTE — PROGRESS NOTES
"Subjective   Patient ID: Brandt Hdz is a 54 y.o. male who presents for URI.  HPI  Head cold and sinus  Now going into the lungs  Use Tessalon without relief  He is a smoker  He uses inhaler therapy for his respiratory condition as well  No fevers  He declines further testing would like medicine  Review of Systems   Constitutional:  Positive for appetite change, chills and fatigue. Negative for fever.   HENT:  Positive for congestion, rhinorrhea and sinus pressure. Negative for ear pain and sore throat.    Eyes:  Negative for pain and redness.   Respiratory:  Positive for cough and shortness of breath.    Gastrointestinal:  Negative for diarrhea, nausea and vomiting.   Genitourinary:  Positive for frequency and urgency. Negative for difficulty urinating.   Musculoskeletal:  Positive for myalgias.   Allergic/Immunologic: Negative for environmental allergies and food allergies.   Neurological:  Negative for dizziness and headaches.   Psychiatric/Behavioral:  Positive for dysphoric mood.        Objective   /76 (BP Location: Right arm, BP Cuff Size: Large adult)   Pulse 87   Ht 1.676 m (5' 6\")   Wt 78.9 kg (174 lb)   SpO2 96%   BMI 28.08 kg/m²    Physical Exam  Vitals reviewed.   Constitutional:       Appearance: Normal appearance. He is obese.   HENT:      Head: Normocephalic.      Right Ear: Tympanic membrane normal.      Left Ear: Tympanic membrane normal. There is impacted cerumen.      Nose: Rhinorrhea present.      Mouth/Throat:      Mouth: Mucous membranes are moist.      Pharynx: Oropharynx is clear.   Eyes:      Extraocular Movements: Extraocular movements intact.      Conjunctiva/sclera: Conjunctivae normal.      Pupils: Pupils are equal, round, and reactive to light.   Cardiovascular:      Rate and Rhythm: Normal rate and regular rhythm.      Pulses: Normal pulses.      Heart sounds: Murmur heard.      No friction rub.   Pulmonary:      Effort: Pulmonary effort is normal. No respiratory " distress.      Breath sounds: Rhonchi present. No wheezing.   Abdominal:      General: Bowel sounds are normal.      Palpations: Abdomen is soft.      Tenderness: There is no abdominal tenderness.   Musculoskeletal:         General: Normal range of motion.      Cervical back: Normal range of motion.   Skin:     General: Skin is warm and dry.      Findings: Bruising present.   Neurological:      General: No focal deficit present.      Mental Status: He is alert and oriented to person, place, and time.   Psychiatric:         Mood and Affect: Mood normal.         Behavior: Behavior normal.         Thought Content: Thought content normal.         Assessment/Plan   Problem List Items Addressed This Visit       RESOLVED: Heart failure with recovered ejection fraction (HFrecEF)     Other Visit Diagnoses       Acute bronchitis, unspecified organism    -  Primary    Relevant Medications    methylPREDNISolone (Medrol Dospak) 4 mg tablets    azithromycin (Zithromax) 250 mg tablet    promethazine-DM (Phenergan-DM) 6.25-15 mg/5 mL syrup            Patient education provided.  Stay current with age appropriate health maintenance as instructed.  Appointment here or ER with new or worsening symptoms'  Keep appropriate follow-up visit.  Stay current with proper immunizations   Further testing with persistence  Meds as above  Report new or worsening issues  Stop smoking  Discussed at length with patient

## 2025-01-22 DIAGNOSIS — I50.9 CONGENITAL HEART FAILURE: ICD-10-CM

## 2025-01-22 DIAGNOSIS — I10 PRIMARY HYPERTENSION: ICD-10-CM

## 2025-01-22 DIAGNOSIS — I42.9 CARDIOMYOPATHY, UNSPECIFIED: ICD-10-CM

## 2025-01-22 RX ORDER — FUROSEMIDE 20 MG/1
20 TABLET ORAL DAILY
Qty: 90 TABLET | Refills: 3 | Status: SHIPPED | OUTPATIENT
Start: 2025-01-22 | End: 2026-01-22

## 2025-01-22 NOTE — TELEPHONE ENCOUNTER
Received request for prescription refills for patient.   Patient follows with Dr. Blue     Request is for Lasix  Is patient currently on medication yes    Last OV 8/15/24  Next OV 2/7/25    Pended for signing and sent to provider

## 2025-02-06 ENCOUNTER — APPOINTMENT (OUTPATIENT)
Dept: BEHAVIORAL HEALTH | Facility: CLINIC | Age: 55
End: 2025-02-06
Payer: COMMERCIAL

## 2025-02-06 DIAGNOSIS — F31.81 BIPOLAR II DISORDER (MULTI): ICD-10-CM

## 2025-02-06 DIAGNOSIS — F33.2 MAJOR DEPRESSIVE DISORDER, RECURRENT SEVERE WITHOUT PSYCHOTIC FEATURES (MULTI): ICD-10-CM

## 2025-02-06 DIAGNOSIS — F41.9 ANXIETY: ICD-10-CM

## 2025-02-06 PROCEDURE — 99212 OFFICE O/P EST SF 10 MIN: CPT | Performed by: CLINICAL NURSE SPECIALIST

## 2025-02-06 PROCEDURE — 4010F ACE/ARB THERAPY RXD/TAKEN: CPT | Performed by: CLINICAL NURSE SPECIALIST

## 2025-02-06 RX ORDER — HYDROXYZINE HYDROCHLORIDE 25 MG/1
25 TABLET, FILM COATED ORAL 2 TIMES DAILY
Qty: 180 TABLET | Refills: 0 | Status: SHIPPED | OUTPATIENT
Start: 2025-02-06 | End: 2025-05-07

## 2025-02-06 RX ORDER — BUSPIRONE HYDROCHLORIDE 10 MG/1
15 TABLET ORAL 2 TIMES DAILY
COMMUNITY

## 2025-02-06 RX ORDER — BUPROPION HYDROCHLORIDE 300 MG/1
300 TABLET ORAL
Qty: 90 TABLET | Refills: 0 | Status: SHIPPED | OUTPATIENT
Start: 2025-02-06 | End: 2025-05-07

## 2025-02-06 NOTE — PROGRESS NOTES
Outpatient Psychiatry      Subjective   Brandt Hdz, a 54 y.o. male, presents for a virtual audio and video virtual appointment, he was at home for this appointment  Patient is referred by Mani Fortune MD .    Virtual or Telephone Consent     An interactive audio and video telecommunication system which permits real time communications between the patient (at the originating site) and provider (at the distant site) was utilized to provide this telehealth service.   Verbal consent was requested and obtained from Brandt Hdz on this date, 2/6/25 for a telehealth visit.       Diagnoses : recurrent major depressive disorder moderate F33.1  Generalized anxiety disorder F 41.1 moderate  Bipolar II disorder moderate F 31.81     Problem List         Patient Active Problem List   Diagnosis    Atrioventricular block, complete (Multi)    Biceps tendonitis    Cardiac pacemaker    Cardiomyopathy (Multi)    CHD (congenital heart disease) ([facility])    Central pain syndrome    Cognitive deficits as late effect of cerebrovascular disease    Chronic fatigue    Complaint of memory disorder without observed objective memory deficit    Congenital heart block ([facility])    Congenital heart failure (Multi)    Current every day smoker    Decreased coordination    Stress reaction, chronic    Depression, major, in partial remission ([facility])    Dyspnea on exertion    Erectile dysfunction    Gait, antalgic    Gastroesophageal reflux disease without esophagitis    Primary hypertension    Hyperhidrosis of soles    Hyperkeratosis    Left ventricular systolic dysfunction    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)    Low testosterone    Neuropathy    PDA (patent ductus arteriosus) ([facility])    Polyp of colon    Rotator cuff syndrome of right shoulder    Sarcoidosis    Paroxysmal atrial fibrillation (Multi)    Sinus node dysfunction (Multi)    RAD (reactive airway disease) ([facility])    Sleep apnea     Sleep disturbance    Stroke (Multi)    Type 2 diabetes mellitus with hyperglycemia, without long-term current use of insulin (Multi)    Verruca plana    Xerosis of skin    Acute hepatitis C virus infection without hepatic coma    Attention deficit hyperactivity disorder, inattentive type    Congenital nystagmus    Decreased strength    Major depressive disorder, recurrent severe without psychotic features (Multi)    Insomnia    Neuropathic pain    Prediabetes    Pseudophakia    PVT (portal vein thrombosis)    Refractive error    Regular astigmatism of both eyes    Diabetes mellitus without complication (Multi)    Unsteady gait    Visual impairment    Word finding difficulty    Chronic painful diabetic neuropathy (Multi)    Acute bronchitis due to other specified organisms    Chronic cough    High risk medication use    BMI 29.0-29.9,adult    Encounter for medication review and counseling    Encounter to discuss treatment options    Partial seizure disorder (Multi)    Focal epilepsy (Multi)    Left ventricular systolic dysfunction (LVSD)    Obesity    Keratosis    Obesity with body mass index 30 or greater    Primary focal hyperhidrosis of soles    Antalgic gait    Gastroesophageal reflux disease    Smokes tobacco daily    Impaired cognition    Headache    Fatigue    Hyperglycemia    Disorder of rotator cuff    Amnesia    Cognitive deficit due to and not concurrent with cerebrovascular disease    Acute prostatitis    Attention deficit hyperactivity disorder (ADHD), predominantly inattentive type    Decreased testosterone level    Atrial fibrillation (Multi)    Hyperplastic polyp of large intestine    Hypertension    Portal vein thrombosis    Reactive airway disease ([facility])    Disturbance in sleep behavior    Disorder of refraction    Reaction to chronic stress    Depressive disorder    Type 2 diabetes mellitus (Multi)    Asteatosis cutis    Abnormal gait    Bipolar II disorder (Multi)    Mild vascular dementia  without behavioral disturbance, psychotic disturbance, mood disturbance, or anxiety (Multi)    Heart failure with recovered ejection fraction (HFrecEF) (Multi)         Treatment Plan/Recommendations:   Follow-up plan for depression was discussed with patient.  Follow up in 4-6 weeks   Can call  for treatment and scheduling concerns   Can continue self care and wellness efforts and maintain routine health screenings  Psychotropic medications :  Continue bupropion xl 300 mg , daily each morning for depression   Continue and increase buspar 15 mg ( 10 mg , 1 and 1/2 table) twice a day for anxiety and adjunct treatment for depression , has 10 mg tablets does not need a script at this time   Continue hydoxyzine for anxiety 25 mg , two times a day as needed , reduced dose   Continue vraylar 6 mg at bedtime for depression     Continue , already on lamotrigine for central nerve pain per neurology , this can also help with bipolar II disorder , 200 mg twice a day       Review with patient: Treatment plan reviewed with the patient.  Medication risks/benefit reviewed with the patient     HPI:  He says his mood is depressed and anxious   Slight improvement in anxiety , with buspirone , he agrees to increase the dose , explained this can also help with depression   He smokes about 18 cigarrettes a day , almost a pack a day   He says he does not have a car    He used to enjoy reading , mainly comedies   He feels like he has some support in his life , he has 2 older brothers   He is in [place] and has friends in [place]  He goes to meetings for aa every night , he gets a ride   He says he gets nervous at meetings , still pushes himself to go   He has been sober for 8 years    He says his short term memory and long term memory are poor , he says this does not pose any issues with day to day activities   He usually remembers to take his medications , he does not use pill boxes says he can forget to take them once a week   He was  seeing a psychiatrist at [facility] in [place] , he was seen there for a year or two he does not remember much about that place he says    He has been on bupropion for a year or 2 ago when he was really depressed and anxious , he says he gets very nervous and shaky inside   He has been on vraylar since nov 2023 and this has helped some   He feels the need to leave a setting suddenly when he gets suddenly anxious   He takes propranolol for hypertension , he says this helps his anxiety also, and he also takes hydroxyzine as needed for anxiety   He has been on lamotrigine for central nerve pain per neurology   He is diagnosed with bipolar 2 disorder , he had a manic episode a long time ago , he does not remember when   Has a history of psych hospitalization in 2012 for suicidal ideation   He sees a therapist for counseling at [facility]  Previous psych meds :  Depakote : does not remember the response   Remeron : too sedating   Zoloft has been taken , he says he did well on this   Prozac : does not remember the response    He sleeps about 8 hours a night , he naps twice a day for an hour each time   Support provided for stressors      Psych ros and medical ros as noted above             Patient Active Problem List   Diagnosis    Atrioventricular block, complete (Multi)    Biceps tendonitis    Cardiac pacemaker    Cardiomyopathy    CHD (congenital heart disease)    Central pain syndrome    Cognitive deficits as late effect of cerebrovascular disease    Chronic fatigue    Complaint of memory disorder without observed objective memory deficit    Congenital heart block    Current every day smoker    Decreased coordination    Stress reaction, chronic    Dyspnea on exertion    Erectile dysfunction    Gait, antalgic    Gastroesophageal reflux disease without esophagitis    Primary hypertension    Hyperhidrosis of soles    Hyperkeratosis    Left ventricular systolic dysfunction    Low testosterone    Neuropathy     Polyp of colon    Rotator cuff syndrome of right shoulder    Sarcoidosis    Paroxysmal atrial fibrillation (Multi)    Sinus node dysfunction (Multi)    Sleep apnea    Sleep disturbance    Stroke (Multi)    Type 2 diabetes mellitus with hyperglycemia, without long-term current use of insulin    Verruca plana    Xerosis of skin    Acute hepatitis C virus infection without hepatic coma    Attention deficit hyperactivity disorder, inattentive type    Congenital nystagmus    Decreased strength    Major depressive disorder, recurrent severe without psychotic features (Multi)    Insomnia    Neuropathic pain    Prediabetes    Pseudophakia    PVT (portal vein thrombosis)    Refractive error    Regular astigmatism of both eyes    Diabetes mellitus without complication (Multi)    Unsteady gait    Visual impairment    Word finding difficulty    Acute bronchitis due to other specified organisms    Chronic cough    High risk medication use    BMI 27.0-27.9,adult    Encounter for medication review and counseling    Encounter to discuss treatment options    Left ventricular systolic dysfunction (LVSD)    Obesity    Keratosis    Obesity with body mass index 30 or greater    Primary focal hyperhidrosis of soles    Antalgic gait    Gastroesophageal reflux disease    Smokes tobacco daily    Impaired cognition    Headache    Fatigue    Hyperglycemia    Disorder of rotator cuff    Amnesia    Cognitive deficit due to and not concurrent with cerebrovascular disease    Acute prostatitis    Attention deficit hyperactivity disorder (ADHD), predominantly inattentive type    Decreased testosterone level    Atrial fibrillation (Multi)    Hyperplastic polyp of large intestine    Hypertension    Portal vein thrombosis    Reactive airway disease ([facility])    Disturbance in sleep behavior    Disorder of refraction    Reaction to chronic stress    Depressive disorder    Type 2 diabetes mellitus    Asteatosis cutis    Abnormal gait    Mild vascular  dementia without behavioral disturbance, psychotic disturbance, mood disturbance, or anxiety    Hemiplegia and hemiparesis following cerebral infarction affecting left non-dominant side (Multi)    Pacemaker at end of battery life     Current Outpatient Medications:     albuterol (Proventil HFA) 90 mcg/actuation inhaler, Inhale 2 puffs every 4 hours if needed for wheezing or shortness of breath., Disp: 18 g, Rfl: 0    atorvastatin (Lipitor) 10 mg tablet, Take 1 tablet (10 mg) by mouth once daily., Disp: 90 tablet, Rfl: 0    buPROPion XL (Wellbutrin XL) 300 mg 24 hr tablet, Take 1 tablet (300 mg) by mouth once daily in the morning. Take before meals., Disp: 90 tablet, Rfl: 0    busPIRone (Buspar) 10 mg tablet, Take 1.5 tablets (15 mg) by mouth 2 times a day. Does not need a script at this time, Disp: , Rfl:     cariprazine (Vraylar) 6 mg capsule, Take 1 capsule (6 mg) by mouth once daily., Disp: 90 capsule, Rfl: 0    Eliquis 5 mg tablet, Take 1 tablet (5 mg) by mouth 2 times a day., Disp: 180 tablet, Rfl: 1    empagliflozin (Jardiance) 10 mg, Take 1 tablet (10 mg) by mouth once daily., Disp: 30 tablet, Rfl: 11    furosemide (Lasix) 20 mg tablet, Take 1 tablet (20 mg) by mouth once daily., Disp: 90 tablet, Rfl: 3    gabapentin (Neurontin) 400 mg capsule, Take 1 capsule (400 mg) by mouth 2 times a day., Disp: 180 capsule, Rfl: 1    hydrOXYzine HCL (Atarax) 25 mg tablet, Take 1 tablet (25 mg) by mouth 2 times a day. prn, Disp: 180 tablet, Rfl: 0    Januvia 100 mg tablet, Take 1 tablet (100 mg) by mouth once daily., Disp: 90 tablet, Rfl: 0    lamoTRIgine (LaMICtal) 200 mg tablet, TAKE 1 TABLET BY MOUTH TWICE DAILY, Disp: 60 tablet, Rfl: 2    melatonin 5 mg capsule, Take 1 capsule (5 mg) by mouth once daily at bedtime. Takes 1-2 capsules each night at bedtime, Disp: , Rfl:     metFORMIN (Glucophage) 500 mg tablet, Take 1 tablet (500 mg) by mouth once daily., Disp: 90 tablet, Rfl: 3    promethazine-DM (Phenergan-DM) 6.25-15  mg/5 mL syrup, Take 5 mL by mouth 4 times a day as needed for cough., Disp: 120 mL, Rfl: 1    propranolol (Inderal) 40 mg tablet, Take 1 tablet (40 mg) by mouth 3 times a day., Disp: 90 tablet, Rfl: 2    tadalafil (Cialis) 20 mg tablet, Take 1 tablet (20 mg) by mouth once daily as needed for erectile dysfunction., Disp: 10 tablet, Rfl: 2    tamsulosin (Flomax) 0.4 mg 24 hr capsule, Take 1 capsule (0.4 mg) by mouth once daily., Disp: 30 capsule, Rfl: 11    telmisartan (Micardis) 20 mg tablet, Take 1 tablet (20 mg) by mouth once daily., Disp: 30 tablet, Rfl: 11  Medical History:  Past Medical History:   Diagnosis Date    Acute bronchitis 02/08/2023    Anxiety     Atrioventricular block, complete (Multi) 07/12/2022    Atrioventricular block, complete    Cough 02/08/2023    Essential (primary) hypertension 05/24/2022    HTN (hypertension)    Foot pain, bilateral 02/08/2023    Hemiplegia, unspecified affecting left nondominant side (Multi) 08/20/2020    Left hemiparesis    Hemiplegia, unspecified affecting left nondominant side (Multi) 08/20/2020    Left hemiparesis    Hemiplegia, unspecified affecting left nondominant side (Multi) 08/20/2020    Left hemiparesis    Hemiplegia, unspecified affecting left nondominant side (Multi) 08/20/2020    Left hemiparesis    History of bipolar disorder 11/01/2019    Last Assessment & Plan: Formatting of this note might be different from the original. Assessment: Stable at this time On multiple medications Follows with Psychiatry    History of sick sinus syndrome 11/01/2019    History of stroke 01/03/2020    Obstructive and reflux uropathy, unspecified 04/07/2017    Obstructive uropathy    Onychomycosis 02/08/2023    Other conditions influencing health status 08/20/2020    Stroke syndrome    Other sleep disorders 03/28/2014    Sleep paralysis    Panic attack     Peripheral neuropathy     Personal history of (corrected) congenital malformations of heart and circulatory system 01/25/2022     History of congenital heart block    Personal history of colonic polyps 07/20/2020    Personal history of colonic polyps    Personal history of other endocrine, nutritional and metabolic disease 09/15/2015    History of hyperlipidemia    Personal history of other specified conditions 09/03/2020    History of memory loss    Presence of cardiac pacemaker 07/12/2022    Cardiac pacemaker    Sleep difficulties     Substance abuse (Multi)     Suicide attempt (Multi)     Unspecified viral hepatitis C without hepatic coma 07/21/2016    Viral hepatitis C without hepatic coma    URI (upper respiratory infection) 02/08/2023     Surgical History:  Past Surgical History:   Procedure Laterality Date    APPENDECTOMY      APPENDECTOMY  04/27/2018    Appendectomy    CARDIAC PACEMAKER PLACEMENT  07/09/2018    Pacemaker Placement    MEDIASTINAL MASS EXCISION      OTHER SURGICAL HISTORY  08/24/2020    Colonoscopy complete for polypectomy    OTHER SURGICAL HISTORY  07/20/2020    Colonoscopy    OTHER SURGICAL HISTORY  04/07/2017    Patent Ductus Arteriosus Repair    OTHER SURGICAL HISTORY  04/07/2017    Mediastinoscopy With Biopsy Of Mediastinal Mass    PATENT DUCTUS ARTERIOUS LIGATION       Family History:  Family History   Problem Relation Name Age of Onset    Stroke Mother      Cancer Mother      Other (cardiac disorder) Mother      Lymphoma Mother      Lung cancer Mother      Colon cancer Mother      Liver cancer Mother      Colon cancer Father      Diabetes Father      Stroke Father      Other (Other) Father      Cancer Father      Ulcerative colitis Brother       Social History:  Social History     Socioeconomic History    Marital status:      Spouse name: Not on file    Number of children: Not on file    Years of education: Not on file    Highest education level: Not on file   Occupational History    Not on file   Tobacco Use    Smoking status: Every Day     Current packs/day: 1.00     Types: Cigarettes     Passive  exposure: Never    Smokeless tobacco: Former     Types: Chew   Vaping Use    Vaping status: Every Day   Substance and Sexual Activity    Alcohol use: Never    Drug use: Never    Sexual activity: Defer   Other Topics Concern    Not on file   Social History Narrative    Not on file     Social Drivers of Health     Financial Resource Strain: Not on file   Food Insecurity: No Food Insecurity (11/15/2023)    Received from [facility]    [facility] Brief Food     Brief social Worry about food: Not on file     Brief social Food run out: Not on file   Transportation Needs: No Transportation Needs (11/15/2023)    Received from [facility]    [facility] Brief Transportation     Brief social Transport to appt: Not on file     Brief social Transport to work: Not on file   Physical Activity: Not on file   Stress: Not on file   Social Connections: Not on file   Intimate Partner Violence: Not on file   Housing Stability: Low Risk  (11/15/2023)    Received from [facility]    [facility] Brief Housing     Brief social Pay for mortgage/rent: Not on file     Brief social: Place to sleep: Not on file     Vitals:  There were no vitals filed for this visit.    Charmaine Irving, APRN-CNS

## 2025-02-07 ENCOUNTER — HOSPITAL ENCOUNTER (OUTPATIENT)
Dept: CARDIOLOGY | Facility: HOSPITAL | Age: 55
Discharge: HOME | End: 2025-02-07
Payer: COMMERCIAL

## 2025-02-07 ENCOUNTER — APPOINTMENT (OUTPATIENT)
Dept: CARDIOLOGY | Facility: CLINIC | Age: 55
End: 2025-02-07
Payer: COMMERCIAL

## 2025-02-07 VITALS
HEIGHT: 66 IN | HEART RATE: 72 BPM | WEIGHT: 171 LBS | DIASTOLIC BLOOD PRESSURE: 60 MMHG | SYSTOLIC BLOOD PRESSURE: 122 MMHG | BODY MASS INDEX: 27.48 KG/M2

## 2025-02-07 DIAGNOSIS — I48.0 PAROXYSMAL ATRIAL FIBRILLATION (MULTI): ICD-10-CM

## 2025-02-07 DIAGNOSIS — Q24.6 CONGENITAL HEART BLOCK: ICD-10-CM

## 2025-02-07 DIAGNOSIS — F17.200 CURRENT EVERY DAY SMOKER: ICD-10-CM

## 2025-02-07 DIAGNOSIS — Z95.0 CARDIAC PACEMAKER: ICD-10-CM

## 2025-02-07 DIAGNOSIS — Q24.9 CHD (CONGENITAL HEART DISEASE): ICD-10-CM

## 2025-02-07 DIAGNOSIS — I63.9 CEREBROVASCULAR ACCIDENT (CVA), UNSPECIFIED MECHANISM (MULTI): ICD-10-CM

## 2025-02-07 DIAGNOSIS — Z45.010 PACEMAKER AT END OF BATTERY LIFE: ICD-10-CM

## 2025-02-07 DIAGNOSIS — I51.9 LEFT VENTRICULAR SYSTOLIC DYSFUNCTION: ICD-10-CM

## 2025-02-07 DIAGNOSIS — Z71.89 ENCOUNTER FOR MEDICATION REVIEW AND COUNSELING: ICD-10-CM

## 2025-02-07 DIAGNOSIS — I48.91 ATRIAL FIBRILLATION, UNSPECIFIED TYPE (MULTI): ICD-10-CM

## 2025-02-07 DIAGNOSIS — I42.9 CARDIOMYOPATHY, UNSPECIFIED TYPE (MULTI): ICD-10-CM

## 2025-02-07 DIAGNOSIS — Z95.0 CARDIAC PACEMAKER: Primary | ICD-10-CM

## 2025-02-07 DIAGNOSIS — I49.5 SINUS NODE DYSFUNCTION (MULTI): ICD-10-CM

## 2025-02-07 DIAGNOSIS — I10 HYPERTENSION, UNSPECIFIED TYPE: ICD-10-CM

## 2025-02-07 DIAGNOSIS — Z71.89 ENCOUNTER TO DISCUSS TREATMENT OPTIONS: ICD-10-CM

## 2025-02-07 DIAGNOSIS — I44.2 ATRIOVENTRICULAR BLOCK, COMPLETE (MULTI): ICD-10-CM

## 2025-02-07 DIAGNOSIS — Z79.899 HIGH RISK MEDICATION USE: ICD-10-CM

## 2025-02-07 PROCEDURE — 93281 PM DEVICE PROGR EVAL MULTI: CPT

## 2025-02-07 PROCEDURE — 3008F BODY MASS INDEX DOCD: CPT | Performed by: INTERNAL MEDICINE

## 2025-02-07 PROCEDURE — 4010F ACE/ARB THERAPY RXD/TAKEN: CPT | Performed by: INTERNAL MEDICINE

## 2025-02-07 PROCEDURE — 3078F DIAST BP <80 MM HG: CPT | Performed by: INTERNAL MEDICINE

## 2025-02-07 PROCEDURE — 3074F SYST BP LT 130 MM HG: CPT | Performed by: INTERNAL MEDICINE

## 2025-02-07 PROCEDURE — 93281 PM DEVICE PROGR EVAL MULTI: CPT | Performed by: INTERNAL MEDICINE

## 2025-02-07 PROCEDURE — 93000 ELECTROCARDIOGRAM COMPLETE: CPT | Mod: DISTINCT PROCEDURAL SERVICE | Performed by: INTERNAL MEDICINE

## 2025-02-07 PROCEDURE — 99215 OFFICE O/P EST HI 40 MIN: CPT | Performed by: INTERNAL MEDICINE

## 2025-02-07 RX ORDER — CHLORHEXIDINE GLUCONATE 40 MG/ML
SOLUTION TOPICAL ONCE
OUTPATIENT
Start: 2025-02-07 | End: 2025-02-07

## 2025-02-07 RX ORDER — SODIUM CHLORIDE 9 MG/ML
100 INJECTION, SOLUTION INTRAVENOUS CONTINUOUS
OUTPATIENT
Start: 2025-02-07 | End: 2025-02-08

## 2025-02-07 RX ORDER — MUPIROCIN 20 MG/G
1 OINTMENT TOPICAL ONCE
OUTPATIENT
Start: 2025-02-07 | End: 2025-02-07

## 2025-02-07 ASSESSMENT — ENCOUNTER SYMPTOMS
PALPITATIONS: 0
DYSPNEA ON EXERTION: 0

## 2025-02-07 NOTE — PROGRESS NOTES
"Chief Complaint:   Follow-up (Patient is present for 6 month follow up with device check /)     History Of Present Illness:    Brandt Hdz is a 54 y.o. male presenting with follow-up.   Patient denies any arrhythmia symptoms of palpitation, lightheadedness, near syncope, or syncope.    His device is at replacement indicator.  Last Recorded Vitals:  Vitals:    02/07/25 0816   BP: 122/60   BP Location: Left arm   Patient Position: Sitting   Pulse: 72   Weight: 77.6 kg (171 lb)   Height: 1.676 m (5' 6\")       Past Medical History:  See List    Past Surgical History:  See List      Social History:  He reports that he has been smoking cigarettes. He has never been exposed to tobacco smoke. He has quit using smokeless tobacco.  His smokeless tobacco use included chew. He reports that he does not drink alcohol and does not use drugs.    Family History:  Family History   Problem Relation Name Age of Onset    Stroke Mother      Cancer Mother      Other (cardiac disorder) Mother      Lymphoma Mother      Lung cancer Mother      Colon cancer Mother      Liver cancer Mother      Colon cancer Father      Diabetes Father      Stroke Father      Other (Other) Father      Cancer Father      Ulcerative colitis Brother          Allergies:  Patient has no known allergies.    Outpatient Medications:  Current Outpatient Medications   Medication Instructions    albuterol (Proventil HFA) 90 mcg/actuation inhaler 2 puffs, inhalation, Every 4 hours PRN    atorvastatin (LIPITOR) 10 mg, oral, Daily    buPROPion XL (WELLBUTRIN XL) 300 mg, oral, Daily before breakfast    busPIRone (BUSPAR) 15 mg, 2 times daily    cariprazine (VRAYLAR) 6 mg, oral, Daily    Eliquis 5 mg, oral, 2 times daily    empagliflozin (JARDIANCE) 10 mg, oral, Daily    furosemide (LASIX) 20 mg, oral, Daily    gabapentin (NEURONTIN) 400 mg, oral, 2 times daily    hydrOXYzine HCL (ATARAX) 25 mg, oral, 2 times daily, prn    Januvia 100 mg, oral, Daily    lamoTRIgine " (LaMICtal) 200 mg tablet oral, 2 times daily    melatonin 5 mg, Nightly    metFORMIN (GLUCOPHAGE) 500 mg, oral, Daily    promethazine-DM (Phenergan-DM) 6.25-15 mg/5 mL syrup 5 mL, oral, 4 times daily PRN    propranolol (INDERAL) 40 mg, oral, 3 times daily    tadalafil (CIALIS) 20 mg, oral, Daily PRN    tamsulosin (FLOMAX) 0.4 mg, oral, Daily    telmisartan (MICARDIS) 20 mg, oral, Daily   Review of Systems   Cardiovascular:  Negative for chest pain, dyspnea on exertion and palpitations.   All other systems reviewed and are negative.        Physical Exam:  Constitutional:       General: Awake.      Appearance: Normal and healthy appearance. Well-developed and not in distress.   Neck:      Vascular: No JVR. JVD normal.   Pulmonary:      Effort: Pulmonary effort is normal.      Breath sounds: Normal breath sounds. No wheezing. No rhonchi. No rales.   Chest:      Chest wall: Not tender to palpatation.      Comments: Left sided device pocket- healed and well approximated. No swelling or hematoma      Cardiovascular:      PMI at left midclavicular line. Normal rate. Regular rhythm. Normal S1. Normal S2.       Murmurs: There is no murmur.      No gallop.  No click. No rub.   Pulses:     Intact distal pulses.   Edema:     Peripheral edema absent.   Abdominal:      Tenderness: There is no abdominal tenderness.   Musculoskeletal: Normal range of motion.         General: No tenderness. Skin:     General: Skin is warm and dry.   Neurological:      General: No focal deficit present.      Mental Status: Alert and oriented to person, place and time.            Last Labs:  CBC -  Lab Results   Component Value Date    WBC 8.6 08/22/2024    HGB 16.5 08/22/2024    HCT 51.1 08/22/2024    MCV 91 08/22/2024     08/22/2024       CMP -  Lab Results   Component Value Date    CALCIUM 9.6 08/22/2024    PROT 7.5 08/22/2024    ALBUMIN 4.5 08/22/2024    AST 13 08/22/2024    ALT 13 08/22/2024    ALKPHOS 62 08/22/2024    BILITOT 0.7  08/22/2024       LIPID PANEL -   Lab Results   Component Value Date    CHOL 204 (H) 08/22/2024    TRIG 219 (H) 08/22/2024    HDL 31.9 08/22/2024    CHHDL 6.4 08/22/2024    LDLF 118 (H) 02/27/2023    VLDL 44 (H) 08/22/2024    NHDL 172 (H) 08/22/2024       RENAL FUNCTION PANEL -   Lab Results   Component Value Date    GLUCOSE 233 (H) 08/22/2024     08/22/2024    K 4.4 08/22/2024    CL 99 08/22/2024    CO2 27 08/22/2024    ANIONGAP 15 08/22/2024    BUN 16 08/22/2024    CREATININE 1.19 08/22/2024    GFRMALE 77 02/26/2023    CALCIUM 9.6 08/22/2024    ALBUMIN 4.5 08/22/2024        Lab Results   Component Value Date     (H) 08/22/2024    HGBA1C 11.0 (H) 08/22/2024       Last Cardiology Tests:  ECG:  ECG 12 lead (Clinic Performed) 08/15/2024  Today.  Normal sinus rhythm.  Appropriate biventricular pacing.  Left axis deviation.  Paced QT interval 440 ms.    Device evaluation today.  Medtronic W4 TR 01 biventricular pacemaker.  At [place].  71% atrial pacing.  99.9% biventricular pacing.    Lab review: I have personally reviewed the laboratory result(s) see above    Assessment/Plan   Diagnoses and all orders for this visit:  Cardiac pacemaker  High risk medication use  Atrioventricular block, complete (Multi)  Congenital heart block  Paroxysmal atrial fibrillation (Multi)  Atrial fibrillation, unspecified type (Multi)  Cardiomyopathy, unspecified type (Multi)  CHD (congenital heart disease)  Hypertension, unspecified type  Left ventricular systolic dysfunction  Sinus node dysfunction (Multi)  Current every day smoker  BMI 27.0-27.9,adult  Encounter for medication review and counseling  Encounter to discuss treatment options  Cerebrovascular accident (CVA), unspecified mechanism (Multi)        Katiuska Zeng RN    Congenital complete heart block status post remote pacemaker and subsequently with nonischemic cardiomyopathy. Status post upgrade to CRT-P 2018. With medication and biventricular pacing, LVEF essentially  normalized to 54% by last echocardiogram January 2022.   Chronotropic incompetence.  Previously adjusted sensor by walk test.    Medtronic W4 TR 01 biventricular pacemaker.  Device now at replacement indicator.  Shared decision making performed.,  [place] decision tool.  All questions answered.  E consent obtained.  Reviewed what medications to hold prior to procedure.  Cardiac catheterization from July 2016 with normal coronary arteries. Ejection fraction 45%  History of PDA. Congenital heart disease. Follow-up with adult congenital clinic with Dr. Renner  History of CVAs. On long-term warfarin. Follows with neurology  High risk medication. Anticoagulated. INRs followed with anticoagulation clinic  Legally blind. Chronic nystagmus  History of panic attacks.   Overweight   AHA recommendations for exercise, diet, and behavioral modification reviewed with pt.     Counseling over 50% visit performed.  The patient and I discussed the mechanism of arrhythmia, heart failure, heart failure device, preoperative cardiac evaluation, shared decision making, [place] decision tool, review of medication indications and what to hold for procedure, treatment options, informed consent, risks, benefits, and imponderables. American Heart Association lifestyle changes and behavioral modification discussed. All questions answered in detail. Patient appreciative of care

## 2025-02-07 NOTE — PATIENT INSTRUCTIONS
Pre-Procedure Patient Information    You have been scheduled for: BIV pacemaker generator change out  At: [facility]  With: Dr. Blue  Date of procedure: THURS. 2-27-25  REPORT TO 2ND FLOOR OUT PATIENT AREA  1. Please have transportation to and from the hospital. While you should plan for same-day discharge there is a possibility you will need to stay overnight.    2. You will receive a call from the hospital 24 - 72 hours before your procedure providing you with fasting instructions, procedure location detail, and time of arrival.  If you have not received a call from the hospital by 6 pm the day before your scheduled procedure, please call [phone].    3. Please bring a current list of medications with you to the hospital.      4. Medications to hold:     - If you are on a blood thinner (like Eliquis and Xarelto), please hold for 2 full days before procedure.   - If you are diabetic on oral pills: please hold your morning oral diabetes medications (that includes metformin, glipizide).        - If you are on Jardiance, Ozempic, Wgovy, Monjaro ect, please hold 3 days. This includes Januvia.       - Otherwise, you may continue your medications in the morning with sips of water.     5. Nothing to eat after midnight before the procedure. OK to take morning medications, with above exceptions, the day of the procedure with a small sip of water.     6. Please bring to our attention if you have any contrast, latex or metal allergies.    7. Please have your blood work as instructed completed at least a day before your procedure.    8. If you have any questions, please contact the office at [phone].    SCHEDULE BIV pacemaker generator change. Obtain labs 1 week prior to procedure. Orders are in the system.   HOLD Eliquis 2 days prior to procedure.   HOLD Jardiance 3 days prior to procedure.   HOLD Januvia 3 days prior to procedure.   HOLD Metformin the morning of your procedure.   Follow up 7 days after procedure  for wound check.    I, SHAKIR ISIDRO RN, AM SCRIBING FOR AND IN THE PRESENCE OF DR. IVELISSE ARMIJO MD, FACC, FACP, FHRS

## 2025-02-17 DIAGNOSIS — E11.40 CHRONIC PAINFUL DIABETIC NEUROPATHY (MULTI): ICD-10-CM

## 2025-02-17 RX ORDER — GABAPENTIN 400 MG/1
400 CAPSULE ORAL 2 TIMES DAILY
Qty: 180 CAPSULE | Refills: 1 | Status: SHIPPED | OUTPATIENT
Start: 2025-02-17

## 2025-02-17 NOTE — TELEPHONE ENCOUNTER
Rx Refill Request     Name: Brandt Hdz  :  1970   Medication Name:  gabapentin (Neurontin) 400 mg capsule   Specific Pharmacy location:  [facility] [id number] [place], [place]   Date of last appointment:  2025   Date of next appointment:  2025   Best number to reach patient:  [phone]

## 2025-02-18 ENCOUNTER — APPOINTMENT (OUTPATIENT)
Dept: CARDIOLOGY | Facility: HOSPITAL | Age: 55
End: 2025-02-18
Payer: COMMERCIAL

## 2025-02-18 ENCOUNTER — APPOINTMENT (OUTPATIENT)
Dept: CARDIOLOGY | Facility: CLINIC | Age: 55
End: 2025-02-18
Payer: COMMERCIAL

## 2025-02-18 DIAGNOSIS — G40.109 FOCAL EPILEPSY (MULTI): Primary | ICD-10-CM

## 2025-02-18 RX ORDER — LAMOTRIGINE 200 MG/1
TABLET ORAL 2 TIMES DAILY
Qty: 60 TABLET | Refills: 2 | Status: SHIPPED | OUTPATIENT
Start: 2025-02-18

## 2025-02-19 ENCOUNTER — LAB (OUTPATIENT)
Dept: LAB | Facility: HOSPITAL | Age: 55
End: 2025-02-19
Payer: COMMERCIAL

## 2025-02-19 DIAGNOSIS — Z45.010 PACEMAKER AT END OF BATTERY LIFE: ICD-10-CM

## 2025-02-19 DIAGNOSIS — Q24.6 CONGENITAL HEART BLOCK: ICD-10-CM

## 2025-02-19 DIAGNOSIS — I49.5 SINUS NODE DYSFUNCTION (MULTI): ICD-10-CM

## 2025-02-19 LAB
ANION GAP SERPL CALC-SCNC: 14 MMOL/L (ref 10–20)
BUN SERPL-MCNC: 16 MG/DL (ref 6–23)
CALCIUM SERPL-MCNC: 9.9 MG/DL (ref 8.6–10.3)
CHLORIDE SERPL-SCNC: 103 MMOL/L (ref 98–107)
CO2 SERPL-SCNC: 28 MMOL/L (ref 21–32)
CREAT SERPL-MCNC: 1.44 MG/DL (ref 0.5–1.3)
EGFRCR SERPLBLD CKD-EPI 2021: 58 ML/MIN/1.73M*2
ERYTHROCYTE [DISTWIDTH] IN BLOOD BY AUTOMATED COUNT: 14.6 % (ref 11.5–14.5)
GLUCOSE SERPL-MCNC: 256 MG/DL (ref 74–99)
HCT VFR BLD AUTO: 48.8 % (ref 41–52)
HGB BLD-MCNC: 16.3 G/DL (ref 13.5–17.5)
INR PPP: 1.3 (ref 0.9–1.1)
MCH RBC QN AUTO: 29.8 PG (ref 26–34)
MCHC RBC AUTO-ENTMCNC: 33.4 G/DL (ref 32–36)
MCV RBC AUTO: 89 FL (ref 80–100)
NRBC BLD-RTO: 0 /100 WBCS (ref 0–0)
PLATELET # BLD AUTO: 217 X10*3/UL (ref 150–450)
POTASSIUM SERPL-SCNC: 4.5 MMOL/L (ref 3.5–5.3)
PROTHROMBIN TIME: 15.2 SECONDS (ref 9.8–12.8)
RBC # BLD AUTO: 5.47 X10*6/UL (ref 4.5–5.9)
SODIUM SERPL-SCNC: 140 MMOL/L (ref 136–145)
WBC # BLD AUTO: 9 X10*3/UL (ref 4.4–11.3)

## 2025-02-19 PROCEDURE — 80048 BASIC METABOLIC PNL TOTAL CA: CPT

## 2025-02-19 PROCEDURE — 85610 PROTHROMBIN TIME: CPT

## 2025-02-19 PROCEDURE — 36415 COLL VENOUS BLD VENIPUNCTURE: CPT

## 2025-02-19 PROCEDURE — 85027 COMPLETE CBC AUTOMATED: CPT

## 2025-02-26 NOTE — PROGRESS NOTES
"Subjective   Patient ID: Brandt Hdz is a 54 y.o. male who presents for Follow-up.  HPI    Go over lab results  Blood sugar is out of control  He needs an A1c  He has diabetes with horrible diet  Discussed his dietary indiscretion at length  Peanut m and m    Chips ahoy chocolate chip cookies    Mood symptoms stable  No suicidal ideation no psychotic or manic symptoms    Cardiac condition stable  Just had pacemaker replacement    Review of Systems   Constitutional:  Negative for activity change and fatigue.   HENT:  Negative for congestion and sore throat.    Eyes:  Negative for discharge.   Respiratory:  Negative for cough, chest tightness and shortness of breath.    Cardiovascular:  Negative for chest pain and leg swelling.   Gastrointestinal:  Negative for abdominal pain, blood in stool, constipation, diarrhea, nausea and vomiting.   Endocrine: Negative for cold intolerance and heat intolerance.   Genitourinary:  Negative for difficulty urinating and hematuria.   Musculoskeletal:  Negative for arthralgias, back pain, gait problem, myalgias and neck pain.   Allergic/Immunologic: Negative for environmental allergies.   Neurological:  Negative for dizziness, syncope, weakness, numbness and headaches.   Hematological:  Negative for adenopathy. Does not bruise/bleed easily.   Psychiatric/Behavioral:  Negative for dysphoric mood. The patient is not nervous/anxious.    All other systems reviewed and are negative.      Objective   /79 (BP Location: Right arm, BP Cuff Size: Large adult)   Pulse 68   Ht 1.676 m (5' 6\")   Wt 80.6 kg (177 lb 9.6 oz)   SpO2 96%   BMI 28.67 kg/m²    Physical Exam  Vitals and nursing note reviewed.   Constitutional:       General: He is not in acute distress.     Appearance: Normal appearance.   HENT:      Head: Normocephalic and atraumatic.      Right Ear: Tympanic membrane, ear canal and external ear normal.      Left Ear: Tympanic membrane, ear canal and external ear normal. "      Nose: Nose normal.      Mouth/Throat:      Mouth: Mucous membranes are moist.      Pharynx: Oropharynx is clear. No oropharyngeal exudate or posterior oropharyngeal erythema.   Eyes:      Extraocular Movements: Extraocular movements intact.      Conjunctiva/sclera: Conjunctivae normal.      Pupils: Pupils are equal, round, and reactive to light.   Cardiovascular:      Rate and Rhythm: Normal rate and regular rhythm.      Pulses: Normal pulses.      Heart sounds: Normal heart sounds. No murmur heard.  Pulmonary:      Effort: Pulmonary effort is normal. No respiratory distress.      Breath sounds: Normal breath sounds. No wheezing or rales.   Abdominal:      General: Abdomen is flat. Bowel sounds are normal. There is no distension.      Palpations: Abdomen is soft. There is no mass.      Tenderness: There is no abdominal tenderness.   Musculoskeletal:         General: No swelling or deformity. Normal range of motion.      Cervical back: Normal range of motion and neck supple.      Right lower leg: No edema.      Left lower leg: No edema.   Lymphadenopathy:      Cervical: No cervical adenopathy.   Skin:     General: Skin is warm and dry.      Capillary Refill: Capillary refill takes less than 2 seconds.      Findings: No lesion or rash.   Neurological:      General: No focal deficit present.      Mental Status: He is alert and oriented to person, place, and time.      Cranial Nerves: No cranial nerve deficit.      Motor: No weakness.   Psychiatric:         Mood and Affect: Mood normal.         Behavior: Behavior normal.         Thought Content: Thought content normal.         Judgment: Judgment normal.         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  Stay current with age appropriate health maintenance as instructed.  Appointment here or ER with new or worsening symptoms'  Keep appropriate follow-up visit.  Stay current with proper immunizations   Meds as above  Weight loss and diet  Refer to nutrition  Check A1c and urine microalbumin  Stressed importance of proper diabetic health maintenance

## 2025-02-27 ENCOUNTER — HOSPITAL ENCOUNTER (OUTPATIENT)
Facility: HOSPITAL | Age: 55
Setting detail: OUTPATIENT SURGERY
Discharge: HOME | End: 2025-02-27
Attending: INTERNAL MEDICINE | Admitting: INTERNAL MEDICINE
Payer: COMMERCIAL

## 2025-02-27 ENCOUNTER — APPOINTMENT (OUTPATIENT)
Dept: CARDIOLOGY | Facility: HOSPITAL | Age: 55
End: 2025-02-27
Payer: COMMERCIAL

## 2025-02-27 VITALS
DIASTOLIC BLOOD PRESSURE: 81 MMHG | HEIGHT: 66 IN | OXYGEN SATURATION: 99 % | TEMPERATURE: 97.3 F | WEIGHT: 171.3 LBS | HEART RATE: 82 BPM | BODY MASS INDEX: 27.53 KG/M2 | RESPIRATION RATE: 18 BRPM | SYSTOLIC BLOOD PRESSURE: 140 MMHG

## 2025-02-27 DIAGNOSIS — Z45.010 PACEMAKER AT END OF BATTERY LIFE: ICD-10-CM

## 2025-02-27 DIAGNOSIS — I48.0 PAROXYSMAL ATRIAL FIBRILLATION (MULTI): ICD-10-CM

## 2025-02-27 DIAGNOSIS — I49.5 SINUS NODE DYSFUNCTION (MULTI): ICD-10-CM

## 2025-02-27 DIAGNOSIS — Q24.6 CONGENITAL HEART BLOCK: ICD-10-CM

## 2025-02-27 DIAGNOSIS — Z95.0 CARDIAC PACEMAKER: ICD-10-CM

## 2025-02-27 DIAGNOSIS — I44.2 ATRIOVENTRICULAR BLOCK, COMPLETE (MULTI): Primary | ICD-10-CM

## 2025-02-27 LAB
ATRIAL RATE: 63 BPM
GLUCOSE BLD MANUAL STRIP-MCNC: 191 MG/DL (ref 74–99)
GLUCOSE BLD MANUAL STRIP-MCNC: 204 MG/DL (ref 74–99)
GLUCOSE BLD MANUAL STRIP-MCNC: 210 MG/DL (ref 74–99)
P OFFSET: 125 MS
P ONSET: 92 MS
PR INTERVAL: 164 MS
Q ONSET: 175 MS
QRS COUNT: 11 BEATS
QRS DURATION: 148 MS
QT INTERVAL: 462 MS
QTC CALCULATION(BAZETT): 472 MS
QTC FREDERICIA: 469 MS
R AXIS: 102 DEGREES
T AXIS: -66 DEGREES
T OFFSET: 406 MS
VENTRICULAR RATE: 63 BPM

## 2025-02-27 PROCEDURE — 93005 ELECTROCARDIOGRAM TRACING: CPT

## 2025-02-27 PROCEDURE — 33229 REMV&REPLC PM GEN MULT LEADS: CPT | Performed by: INTERNAL MEDICINE

## 2025-02-27 PROCEDURE — 99152 MOD SED SAME PHYS/QHP 5/>YRS: CPT | Performed by: INTERNAL MEDICINE

## 2025-02-27 PROCEDURE — 7100000009 HC PHASE TWO TIME - INITIAL BASE CHARGE: Performed by: INTERNAL MEDICINE

## 2025-02-27 PROCEDURE — 7100000010 HC PHASE TWO TIME - EACH INCREMENTAL 1 MINUTE: Performed by: INTERNAL MEDICINE

## 2025-02-27 PROCEDURE — 99153 MOD SED SAME PHYS/QHP EA: CPT | Performed by: INTERNAL MEDICINE

## 2025-02-27 PROCEDURE — 93010 ELECTROCARDIOGRAM REPORT: CPT | Performed by: INTERNAL MEDICINE

## 2025-02-27 PROCEDURE — 82947 ASSAY GLUCOSE BLOOD QUANT: CPT

## 2025-02-27 PROCEDURE — 93286 PERI-PX EVAL PM/LDLS PM IP: CPT | Performed by: INTERNAL MEDICINE

## 2025-02-27 PROCEDURE — 93280 PM DEVICE PROGR EVAL DUAL: CPT | Mod: 59 | Performed by: INTERNAL MEDICINE

## 2025-02-27 PROCEDURE — 33222 RELOCATION POCKET PACEMAKER: CPT | Mod: 59 | Performed by: INTERNAL MEDICINE

## 2025-02-27 PROCEDURE — 7100000002 HC RECOVERY ROOM TIME - EACH INCREMENTAL 1 MINUTE: Performed by: INTERNAL MEDICINE

## 2025-02-27 PROCEDURE — 2500000004 HC RX 250 GENERAL PHARMACY W/ HCPCS (ALT 636 FOR OP/ED): Performed by: NURSE PRACTITIONER

## 2025-02-27 PROCEDURE — C1889 IMPLANT/INSERT DEVICE, NOC: HCPCS | Performed by: INTERNAL MEDICINE

## 2025-02-27 PROCEDURE — 2720000007 HC OR 272 NO HCPCS: Performed by: INTERNAL MEDICINE

## 2025-02-27 PROCEDURE — 2500000005 HC RX 250 GENERAL PHARMACY W/O HCPCS: Performed by: NURSE PRACTITIONER

## 2025-02-27 PROCEDURE — 2500000004 HC RX 250 GENERAL PHARMACY W/ HCPCS (ALT 636 FOR OP/ED): Performed by: INTERNAL MEDICINE

## 2025-02-27 PROCEDURE — C2621 PMKR, OTHER THAN SING/DUAL: HCPCS | Performed by: INTERNAL MEDICINE

## 2025-02-27 PROCEDURE — 2500000002 HC RX 250 W HCPCS SELF ADMINISTERED DRUGS (ALT 637 FOR MEDICARE OP, ALT 636 FOR OP/ED): Performed by: INTERNAL MEDICINE

## 2025-02-27 PROCEDURE — 2750000001 HC OR 275 NO HCPCS: Performed by: INTERNAL MEDICINE

## 2025-02-27 PROCEDURE — 2500000002 HC RX 250 W HCPCS SELF ADMINISTERED DRUGS (ALT 637 FOR MEDICARE OP, ALT 636 FOR OP/ED): Performed by: NURSE PRACTITIONER

## 2025-02-27 PROCEDURE — 7100000001 HC RECOVERY ROOM TIME - INITIAL BASE CHARGE: Performed by: INTERNAL MEDICINE

## 2025-02-27 DEVICE — ENVELOPE CMRM6122 ABSORB MED US
Type: IMPLANTABLE DEVICE | Site: CHEST | Status: FUNCTIONAL
Brand: TYRX™ ABSORBABLE ANTIBACTERIAL ENVELOPE - MEDIUM

## 2025-02-27 DEVICE — CRTP W4TR01 PERCEPTA QUAD CRTP MRI US
Type: IMPLANTABLE DEVICE | Site: CHEST | Status: FUNCTIONAL
Brand: PERCEPTA™ QUAD CRT-P MRI SURESCAN™

## 2025-02-27 RX ORDER — MIDAZOLAM HYDROCHLORIDE 1 MG/ML
INJECTION, SOLUTION INTRAMUSCULAR; INTRAVENOUS AS NEEDED
Status: DISCONTINUED | OUTPATIENT
Start: 2025-02-27 | End: 2025-02-27 | Stop reason: HOSPADM

## 2025-02-27 RX ORDER — LIDOCAINE HYDROCHLORIDE 10 MG/ML
INJECTION, SOLUTION EPIDURAL; INFILTRATION; INTRACAUDAL; PERINEURAL AS NEEDED
Status: DISCONTINUED | OUTPATIENT
Start: 2025-02-27 | End: 2025-02-27 | Stop reason: HOSPADM

## 2025-02-27 RX ORDER — FENTANYL CITRATE 50 UG/ML
INJECTION, SOLUTION INTRAMUSCULAR; INTRAVENOUS AS NEEDED
Status: DISCONTINUED | OUTPATIENT
Start: 2025-02-27 | End: 2025-02-27 | Stop reason: HOSPADM

## 2025-02-27 RX ORDER — CEFAZOLIN SODIUM 1 G/50ML
1 SOLUTION INTRAVENOUS ONCE
Status: COMPLETED | OUTPATIENT
Start: 2025-02-27 | End: 2025-02-27

## 2025-02-27 RX ORDER — APIXABAN 5 MG/1
5 TABLET, FILM COATED ORAL 2 TIMES DAILY
Start: 2025-02-27

## 2025-02-27 RX ORDER — CHLORHEXIDINE GLUCONATE 40 MG/ML
SOLUTION TOPICAL ONCE
Status: COMPLETED | OUTPATIENT
Start: 2025-02-27 | End: 2025-02-27

## 2025-02-27 RX ORDER — MUPIROCIN 20 MG/G
1 OINTMENT TOPICAL ONCE
Status: COMPLETED | OUTPATIENT
Start: 2025-02-27 | End: 2025-02-27

## 2025-02-27 RX ORDER — BUPIVACAINE HYDROCHLORIDE 2.5 MG/ML
INJECTION, SOLUTION EPIDURAL; INFILTRATION; INTRACAUDAL AS NEEDED
Status: DISCONTINUED | OUTPATIENT
Start: 2025-02-27 | End: 2025-02-27 | Stop reason: HOSPADM

## 2025-02-27 RX ADMIN — INSULIN HUMAN 2 UNITS: 100 INJECTION, SOLUTION PARENTERAL at 07:33

## 2025-02-27 RX ADMIN — Medication: at 07:16

## 2025-02-27 RX ADMIN — CEFAZOLIN SODIUM 1 G: 1 SOLUTION INTRAVENOUS at 08:41

## 2025-02-27 RX ADMIN — MUPIROCIN 1 APPLICATION: 20 OINTMENT TOPICAL at 07:16

## 2025-02-27 RX ADMIN — INSULIN HUMAN 4 UNITS: 100 INJECTION, SOLUTION PARENTERAL at 08:08

## 2025-02-27 ASSESSMENT — PAIN - FUNCTIONAL ASSESSMENT: PAIN_FUNCTIONAL_ASSESSMENT: 0-10

## 2025-02-27 ASSESSMENT — COLUMBIA-SUICIDE SEVERITY RATING SCALE - C-SSRS
6. HAVE YOU EVER DONE ANYTHING, STARTED TO DO ANYTHING, OR PREPARED TO DO ANYTHING TO END YOUR LIFE?: NO
2. HAVE YOU ACTUALLY HAD ANY THOUGHTS OF KILLING YOURSELF?: NO
1. IN THE PAST MONTH, HAVE YOU WISHED YOU WERE DEAD OR WISHED YOU COULD GO TO SLEEP AND NOT WAKE UP?: NO

## 2025-02-27 NOTE — NURSING NOTE
Patient ambulating independently in halls.  No complaints, VSS.  Discharge instructions reinforced regarding activity and driving restrictions.  Patient reviewed fiollow-up appt and to resume home medications except for eliquis which he will resume on Sunday.

## 2025-02-27 NOTE — INTERVAL H&P NOTE
H&P reviewed. The patient was examined and there are no changes to the H&P.    In addition,  He feels ok.    Lab Results   Component Value Date    WBC 9.0 02/19/2025    HGB 16.3 02/19/2025    HCT 48.8 02/19/2025    MCV 89 02/19/2025     02/19/2025      Lab Results   Component Value Date    GLUCOSE 256 (H) 02/19/2025    CALCIUM 9.9 02/19/2025     02/19/2025    K 4.5 02/19/2025    CO2 28 02/19/2025     02/19/2025    BUN 16 02/19/2025    CREATININE 1.44 (H) 02/19/2025      Lab Results   Component Value Date    INR 1.3 (H) 02/19/2025    INR 1.5 (H) 04/14/2023    INR CANCELED 02/28/2023    PROTIME 15.2 (H) 02/19/2025    PROTIME 17.7 (H) 04/14/2023    PROTIME CANCELED 02/28/2023      Counseling over 50% visit performed. The patient and I discussed arrhythmia, complete heart block, chronic incompetence, preop cardiac evaluation, shared decision making, [place] decision tool, treatment options, risks, benefits, and imponderables.

## 2025-02-27 NOTE — NURSING NOTE
Patient recovered from EP lab with left upper pectoral aquacel dressing stable.  Ice bag applied, brother notified and spoken to be Dr Blue.  No complaints, VSS.

## 2025-02-27 NOTE — Clinical Note
The PACEMAKER,  QUAD CRT-P MRI SURESCAN - [id number] device was inserted. The leads were placed into the connector and visually verified to be in correct position.

## 2025-02-27 NOTE — NURSING NOTE
Patient discharged to home via wheelchair and transport to sister in law in car.  Patients brother and nurse reinforced medication eliquis hold until Sunday on phone and spoke to sister in law also.

## 2025-03-03 ENCOUNTER — APPOINTMENT (OUTPATIENT)
Dept: PRIMARY CARE | Facility: CLINIC | Age: 55
End: 2025-03-03
Payer: COMMERCIAL

## 2025-03-03 VITALS
OXYGEN SATURATION: 96 % | HEART RATE: 68 BPM | BODY MASS INDEX: 28.54 KG/M2 | HEIGHT: 66 IN | WEIGHT: 177.6 LBS | SYSTOLIC BLOOD PRESSURE: 118 MMHG | DIASTOLIC BLOOD PRESSURE: 79 MMHG

## 2025-03-03 DIAGNOSIS — I50.32 HEART FAILURE WITH RECOVERED EJECTION FRACTION (HFRECEF): ICD-10-CM

## 2025-03-03 DIAGNOSIS — E11.65 TYPE 2 DIABETES MELLITUS WITH HYPERGLYCEMIA, WITHOUT LONG-TERM CURRENT USE OF INSULIN: ICD-10-CM

## 2025-03-03 DIAGNOSIS — F33.2 MAJOR DEPRESSIVE DISORDER, RECURRENT SEVERE WITHOUT PSYCHOTIC FEATURES (MULTI): Primary | ICD-10-CM

## 2025-03-03 LAB
ATRIAL RATE: 63 BPM
P OFFSET: 125 MS
P ONSET: 92 MS
PR INTERVAL: 164 MS
Q ONSET: 175 MS
QRS COUNT: 11 BEATS
QRS DURATION: 148 MS
QT INTERVAL: 462 MS
QTC CALCULATION(BAZETT): 472 MS
QTC FREDERICIA: 469 MS
R AXIS: 102 DEGREES
T AXIS: -66 DEGREES
T OFFSET: 406 MS
VENTRICULAR RATE: 63 BPM

## 2025-03-03 PROCEDURE — 99214 OFFICE O/P EST MOD 30 MIN: CPT | Performed by: FAMILY MEDICINE

## 2025-03-03 PROCEDURE — 3074F SYST BP LT 130 MM HG: CPT | Performed by: FAMILY MEDICINE

## 2025-03-03 PROCEDURE — 3008F BODY MASS INDEX DOCD: CPT | Performed by: FAMILY MEDICINE

## 2025-03-03 PROCEDURE — 4010F ACE/ARB THERAPY RXD/TAKEN: CPT | Performed by: FAMILY MEDICINE

## 2025-03-03 PROCEDURE — 3078F DIAST BP <80 MM HG: CPT | Performed by: FAMILY MEDICINE

## 2025-03-03 RX ORDER — METFORMIN HYDROCHLORIDE 1000 MG/1
1000 TABLET ORAL
Qty: 180 TABLET | Refills: 3 | Status: SHIPPED | OUTPATIENT
Start: 2025-03-03 | End: 2026-03-03

## 2025-03-03 RX ORDER — METFORMIN HYDROCHLORIDE 500 MG/1
1000 TABLET ORAL
Qty: 180 TABLET | Refills: 3 | Status: SHIPPED | OUTPATIENT
Start: 2025-03-03 | End: 2025-03-03 | Stop reason: ALTCHOICE

## 2025-03-03 ASSESSMENT — ENCOUNTER SYMPTOMS
ACTIVITY CHANGE: 0
DYSPHORIC MOOD: 0
ABDOMINAL PAIN: 0
DIZZINESS: 0
FATIGUE: 0
SHORTNESS OF BREATH: 0
ARTHRALGIAS: 0
NECK PAIN: 0
VOMITING: 0
CHEST TIGHTNESS: 0
DIFFICULTY URINATING: 0
EYE DISCHARGE: 0
HEMATURIA: 0
BLOOD IN STOOL: 0
SORE THROAT: 0
MYALGIAS: 0
ADENOPATHY: 0
NAUSEA: 0
CONSTIPATION: 0
HEADACHES: 0
BACK PAIN: 0
DIARRHEA: 0
WEAKNESS: 0
COUGH: 0
BRUISES/BLEEDS EASILY: 0
NUMBNESS: 0
NERVOUS/ANXIOUS: 0

## 2025-03-04 LAB
ALBUMIN/CREAT UR: NORMAL MG/G CREAT
CREAT UR-MCNC: 24 MG/DL (ref 20–320)
EST. AVERAGE GLUCOSE BLD GHB EST-MCNC: 189 MG/DL
EST. AVERAGE GLUCOSE BLD GHB EST-SCNC: 10.4 MMOL/L
HBA1C MFR BLD: 8.2 % OF TOTAL HGB
MICROALBUMIN UR-MCNC: <0.2 MG/DL

## 2025-03-06 ENCOUNTER — APPOINTMENT (OUTPATIENT)
Dept: CARDIOLOGY | Facility: CLINIC | Age: 55
End: 2025-03-06
Payer: COMMERCIAL

## 2025-03-06 DIAGNOSIS — I49.5 SINUS NODE DYSFUNCTION (MULTI): ICD-10-CM

## 2025-03-06 DIAGNOSIS — I44.2 ATRIOVENTRICULAR BLOCK, COMPLETE (MULTI): ICD-10-CM

## 2025-03-06 DIAGNOSIS — Z95.0 CARDIAC PACEMAKER: ICD-10-CM

## 2025-03-11 ENCOUNTER — APPOINTMENT (OUTPATIENT)
Dept: BEHAVIORAL HEALTH | Facility: CLINIC | Age: 55
End: 2025-03-11
Payer: COMMERCIAL

## 2025-03-19 ENCOUNTER — TELEMEDICINE CLINICAL SUPPORT (OUTPATIENT)
Dept: NUTRITION | Facility: HOSPITAL | Age: 55
End: 2025-03-19
Payer: COMMERCIAL

## 2025-03-19 VITALS — BODY MASS INDEX: 28.67 KG/M2 | HEIGHT: 66 IN

## 2025-03-19 DIAGNOSIS — E11.65 TYPE 2 DIABETES MELLITUS WITH HYPERGLYCEMIA, WITHOUT LONG-TERM CURRENT USE OF INSULIN: ICD-10-CM

## 2025-03-19 PROCEDURE — 97802 MEDICAL NUTRITION INDIV IN: CPT | Mod: 95 | Performed by: DIETITIAN, REGISTERED

## 2025-03-19 NOTE — PROGRESS NOTES
"Nutrition Assessment     Reason for Visit:  Brandt Hdz is a 54 y.o. male who presents for virtual nutrition counseling in the context of type II DM.   Anthropometrics:    Wt Readings from Last 10 Encounters:   03/03/25 80.6 kg (177 lb 9.6 oz)   02/27/25 77.7 kg (171 lb 4.8 oz)   02/07/25 77.6 kg (171 lb)   01/21/25 78.9 kg (174 lb)   01/06/25 79.7 kg (175 lb 9.6 oz)   11/12/24 75.4 kg (166 lb 3.6 oz)   08/26/24 76.7 kg (169 lb 3.2 oz)   08/15/24 79.4 kg (175 lb)   08/13/24 78.5 kg (173 lb 1 oz)   07/25/24 79.6 kg (175 lb 6.4 oz)   Anthropometrics  Height: 167.6 cm (5' 6\")    Lab Results   Component Value Date    HGBA1C 8.2 (H) 03/03/2025     Meds: Metformin, Jardiance    Food And Nutrient Intake:  Food and Nutrient History  Food and Nutrient History: Pt presents for nutrition counseling int he context of type II DM.  Pt states that he has had prediabetes for a while and it has now progressed.  HgbA1c has risen to 8.2. BMI of 28 indicates that pt is overweight.  Diet recall reveals that pt is eating a diet that is high in carbohydrates and lacking in variety.  He is eating a breakfast and snacks which are also bettie in sugar.  Pt will eat vegetables but does not often include in meals.  Pt states that he walks his dog for exercise.  Pt would benefit from following a carbohydrate consistent diet of 60g per meal.  Recommend pt include protein at all meals and snacks and aim for higher fiber foods to lower blood sugar.  Pt states that he understands but is doubting his ability to change his diet.   Food Intake  Meal 1: breakfast:  cereal and milk- Frosted Flakes  Meal 2: lunch: varies- turkey sandwich or pizza  Snacks : candy, chips  Meal 3: dinner- rest of the pizza  Fluid Intake: water, Diet coke, OJ  Pattern of Alcohol Consumption: none       Bioactive Substance Intake  Pattern of Alcohol Consumption: none      Physical Activities:  Physical Activity  Type of Physical Activity: walks his dog; no other planned " "exercise     Nutrition Focused Physical Exam:  Subcutaneous Fat Loss  Defer Subcutaneous Fat Loss Assessment: Defer all  Defer All Reason: no malnutrition susepcted  Muscle Wasting  Defer Muscle Wasting Assessment: Defer all    Energy Needs  Calculated Energy Needs Using Equations  Height: 167.6 cm (5' 6\")  Weight Used for Equation Calculations: 80.6 kg (177 lb 11.1 oz)  [place]- [place] Equation (Overweight or Obese Patients): 1589  Activity Factor: 1.2  Total Energy Needs: 1906  Estimated Energy Needs  Total Energy Estimated Needs in 24 hours (kCal): 1407 kCal  Method for Estimating Needs: MSJ x 1.2-500 kcals  Estimated Protein Needs  Total Protein Estimated Needs in 24 Hours (g): 70 g  Method for Estimating 24 Hour Protein Needs: 20% of est calories        Nutrition Diagnosis   Malnutrition Diagnosis  Patient has Malnutrition Diagnosis: No  Nutrition Diagnosis  Patient has Nutrition Diagnosis: Yes  Diagnosis Status (1): New  Nutrition Diagnosis 1: Altered nutrition related to laboratory values  Related to (1): type II DM  As Evidenced by (1): elevated HgbA1c  Additional Nutrition Diagnosis: Diagnosis 2  Diagnosis Status (2): New  Nutrition Diagnosis 2: Excessive carbohydrate intake  Related to (2): type II DM  As Evidenced by (2): diet recall    Nutrition Interventions/Recommendations   Food and Nutrition Delivery  Meals & Snacks: Carbohydrate-modified diet, Energy-modified diet  Goals: 60 g carb per meal; 3 meals with 1-2 snacks per day; ~1400 daily kcal intake; ensure adequate protein sources with each meal and snack  Nutrition Education  Nutrition Education Content: Content related nutrition education, Education on nutrition's influence on health, Physical activity guidance  Goals: Instructed on consistent carbohydrate intake, blood sugar goals, exercise, proper portion sizes, label reading, and general healthful nutrition. Instructed on benefits of wt loss with diabetes and heart health. Both verbal and " written education provided in the one-on-one setting.Pt verbalized understanding of information provided. All questions answered to pt satisfaction throughout visit.    Patient Instructions   Consistency of meals is important!  Eat 3 meals per day with snacks.  Eat an hour after waking up and don't go more than 3-4 hours without eating.    Keep carbohydrates consistent from meal to meal.  Aim for 60 g of carbohydrate per meal.  Refer to carbohydrate lists for serving sizes.   Read labels- 15g of total carbohydrate is a serving.     - Choose foods that have fiber listed on the labels.  This helps slow down the release of sugar in your bloodstream.    Include a source of protein with all meals and snack such as meat, fish, nuts, peanut butter, yogurt, cottage cheese or eggs.    -Keep high protein snacks on hand at home- see snack handout.  Try to reduce your weight by 1# per week- this can help lower blood sugar as well.     - see 1400 calorie sample meal plans for ideas.     -  Consider tracking your calorie intake on an ya such as [employer] or [employer] to track your calories.    6.  Drink 64 oz of water other non-caloric beverage.  7.  Aim for 30 minutes of exercise such as walking on most days.        Nutrition Monitoring and Evaluation   Food and Nutrient Intake  Monitoring and Evaluation Plan: Energy intake, Fluid intake, Meal/snack pattern, Protein intake, Carbohydrate intake, Fiber intake  Criteria: consume 1400 kcal/day  Criteria: aim for 64 oz of water daily, limit sugar sweetened beveraged  Criteria: 3 meals per day with 1-2 snacks between meals  Criteria: ensure adequate protein at each meal and snack ~ 30 g/meal  Estimated carbohydrate intake: Estimated carbohydrate intake  Criteria: 60 g carb per meal  Criteria: ensure adequate fiber is present at meals - focus on 1/2 plate nonstarchy vegetables and choosing whole grain foods when able  Knowledge Belief Attitude Determination   Monitoring and Evaluation  Plan: Physical activity  Criteria: Encouraged regular physical activity including strength training as medically appropriate per AHA guidelines  Anthropometric measurements  Monitoring and Evaluation Plan: Weight change  Criteria: 1-2 lb wt loss per week  Biochemical Data, Medical Tests and Procedures  Monitoring and Evaluation Plan: Glucose/endocrine profile  Glucose/Endocrine Profile: Hemoglobin A1c (HgbA1c), Glucose, fasting  Criteria: A1c <7%; fasting blood glucose  mg/dl    Readiness to Change : Fair  Level of Understanding : Fair  Anticipated Compliant : Fair

## 2025-03-19 NOTE — PATIENT INSTRUCTIONS
Consistency of meals is important!  Eat 3 meals per day with snacks.  Eat an hour after waking up and don't go more than 3-4 hours without eating.    Keep carbohydrates consistent from meal to meal.  Aim for 60 g of carbohydrate per meal.  Refer to carbohydrate lists for serving sizes.   Read labels- 15g of total carbohydrate is a serving.     - Choose foods that have fiber listed on the labels.  This helps slow down the release of sugar in your bloodstream.    Include a source of protein with all meals and snack such as meat, fish, nuts, peanut butter, yogurt, cottage cheese or eggs.    -Keep high protein snacks on hand at home- see snack handout.  Try to reduce your weight by 1# per week- this can help lower blood sugar as well.     - see 1400 calorie sample meal plans for ideas.     -  Consider tracking your calorie intake on an ya such as [employer] or [employer] to track your calories.    6.  Drink 64 oz of water other non-caloric beverage.  7.  Aim for 30 minutes of exercise such as walking on most days.

## 2025-03-26 NOTE — PROGRESS NOTES
Subjective   Reason for Visit: Brandt Hdz is an 54 y.o. y.o. male here for a Medicare Wellness visit.               HPI    Patient Care Team:  Mani Fortune MD as PCP - General  Mani Fortune MD as PCP - [facility] PCP     Review of Systems    Objective   Vitals:  There were no vitals taken for this visit.      Physical Exam    Assessment/Plan   Problem List Items Addressed This Visit    None  Patient education provided.  Stay current with age appropriate health maintenance as instructed.  Appointment here or ER with new or worsening symptoms'  Keep appropriate follow-up visit.  Stay current with proper immunizations

## 2025-03-27 DIAGNOSIS — E11.65 TYPE 2 DIABETES MELLITUS WITH HYPERGLYCEMIA, WITHOUT LONG-TERM CURRENT USE OF INSULIN: ICD-10-CM

## 2025-03-27 DIAGNOSIS — I10 PRIMARY HYPERTENSION: ICD-10-CM

## 2025-03-27 NOTE — TELEPHONE ENCOUNTER
Rx Refill Request     Name: Brandt Hdz  :  1970   Medication Name:  atorvastatin   Specific Pharmacy location:  [facility] in [facility]   Date of last appointment:  3/3/2025   Date of next appointment:  2025   Best number to reach patient:  [phone]

## 2025-03-31 RX ORDER — ATORVASTATIN CALCIUM 10 MG/1
10 TABLET, FILM COATED ORAL DAILY
Qty: 90 TABLET | Refills: 0 | Status: SHIPPED | OUTPATIENT
Start: 2025-03-31 | End: 2026-05-05

## 2025-04-01 ENCOUNTER — TELEMEDICINE (OUTPATIENT)
Dept: BEHAVIORAL HEALTH | Facility: CLINIC | Age: 55
End: 2025-04-01
Payer: COMMERCIAL

## 2025-04-01 ENCOUNTER — APPOINTMENT (OUTPATIENT)
Dept: PRIMARY CARE | Facility: CLINIC | Age: 55
End: 2025-04-01
Payer: COMMERCIAL

## 2025-04-01 DIAGNOSIS — F41.9 ANXIETY: ICD-10-CM

## 2025-04-01 DIAGNOSIS — F31.81 BIPOLAR II DISORDER (MULTI): ICD-10-CM

## 2025-04-01 DIAGNOSIS — F33.2 MAJOR DEPRESSIVE DISORDER, RECURRENT SEVERE WITHOUT PSYCHOTIC FEATURES (MULTI): ICD-10-CM

## 2025-04-01 PROCEDURE — 4010F ACE/ARB THERAPY RXD/TAKEN: CPT | Performed by: CLINICAL NURSE SPECIALIST

## 2025-04-01 PROCEDURE — 99212 OFFICE O/P EST SF 10 MIN: CPT | Performed by: CLINICAL NURSE SPECIALIST

## 2025-04-01 RX ORDER — BUSPIRONE HYDROCHLORIDE 10 MG/1
20 TABLET ORAL 2 TIMES DAILY
Qty: 360 TABLET | Refills: 0 | Status: SHIPPED | OUTPATIENT
Start: 2025-04-01 | End: 2025-06-30

## 2025-04-01 RX ORDER — HYDROXYZINE HYDROCHLORIDE 25 MG/1
25 TABLET, FILM COATED ORAL 2 TIMES DAILY
Qty: 180 TABLET | Refills: 0 | Status: SHIPPED | OUTPATIENT
Start: 2025-04-01 | End: 2025-06-30

## 2025-04-01 RX ORDER — BUPROPION HYDROCHLORIDE 300 MG/1
300 TABLET ORAL
Qty: 90 TABLET | Refills: 0 | Status: SHIPPED | OUTPATIENT
Start: 2025-04-01 | End: 2025-06-30

## 2025-04-01 NOTE — PROGRESS NOTES
Outpatient Psychiatry      Subjective   Brandt Hdz, a 54 y.o. male,presents for a virtual audio and video virtual appointment, he was at home for this appointment  Patient is referred by Mani Fortune MD .    Virtual or Telephone Consent     An interactive audio and video telecommunication system which permits real time communications between the patient (at the originating site) and provider (at the distant site) was utilized to provide this telehealth service.   Verbal consent was requested and obtained from Brandt Hdz on this date, 4/1/25 for a telehealth visit.       Diagnoses : recurrent major depressive disorder moderate F33.1  Generalized anxiety disorder F 41.1 moderate  Bipolar II disorder moderate F 31.81     Problem List         Patient Active Problem List   Diagnosis    Atrioventricular block, complete (Multi)    Biceps tendonitis    Cardiac pacemaker    Cardiomyopathy (Multi)    CHD (congenital heart disease) ([facility])    Central pain syndrome    Cognitive deficits as late effect of cerebrovascular disease    Chronic fatigue    Complaint of memory disorder without observed objective memory deficit    Congenital heart block ([facility])    Congenital heart failure (Multi)    Current every day smoker    Decreased coordination    Stress reaction, chronic    Depression, major, in partial remission ([facility])    Dyspnea on exertion    Erectile dysfunction    Gait, antalgic    Gastroesophageal reflux disease without esophagitis    Primary hypertension    Hyperhidrosis of soles    Hyperkeratosis    Left ventricular systolic dysfunction    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)    Low testosterone    Neuropathy    PDA (patent ductus arteriosus) ([facility])    Polyp of colon    Rotator cuff syndrome of right shoulder    Sarcoidosis    Paroxysmal atrial fibrillation (Multi)    Sinus node dysfunction (Multi)    RAD (reactive airway disease) ([facility])    Sleep apnea     Sleep disturbance    Stroke (Multi)    Type 2 diabetes mellitus with hyperglycemia, without long-term current use of insulin (Multi)    Verruca plana    Xerosis of skin    Acute hepatitis C virus infection without hepatic coma    Attention deficit hyperactivity disorder, inattentive type    Congenital nystagmus    Decreased strength    Major depressive disorder, recurrent severe without psychotic features (Multi)    Insomnia    Neuropathic pain    Prediabetes    Pseudophakia    PVT (portal vein thrombosis)    Refractive error    Regular astigmatism of both eyes    Diabetes mellitus without complication (Multi)    Unsteady gait    Visual impairment    Word finding difficulty    Chronic painful diabetic neuropathy (Multi)    Acute bronchitis due to other specified organisms    Chronic cough    High risk medication use    BMI 29.0-29.9,adult    Encounter for medication review and counseling    Encounter to discuss treatment options    Partial seizure disorder (Multi)    Focal epilepsy (Multi)    Left ventricular systolic dysfunction (LVSD)    Obesity    Keratosis    Obesity with body mass index 30 or greater    Primary focal hyperhidrosis of soles    Antalgic gait    Gastroesophageal reflux disease    Smokes tobacco daily    Impaired cognition    Headache    Fatigue    Hyperglycemia    Disorder of rotator cuff    Amnesia    Cognitive deficit due to and not concurrent with cerebrovascular disease    Acute prostatitis    Attention deficit hyperactivity disorder (ADHD), predominantly inattentive type    Decreased testosterone level    Atrial fibrillation (Multi)    Hyperplastic polyp of large intestine    Hypertension    Portal vein thrombosis    Reactive airway disease ([facility])    Disturbance in sleep behavior    Disorder of refraction    Reaction to chronic stress    Depressive disorder    Type 2 diabetes mellitus (Multi)    Asteatosis cutis    Abnormal gait    Bipolar II disorder (Multi)    Mild vascular dementia  without behavioral disturbance, psychotic disturbance, mood disturbance, or anxiety (Multi)    Heart failure with recovered ejection fraction (HFrecEF) (Multi)         Treatment Plan/Recommendations:   Follow-up plan for depression was discussed with patient.  Follow up in 4-6 weeks   Can call  for treatment and scheduling concerns   Can continue self care and wellness efforts and maintain routine health screenings  Psychotropic medications :  Continue bupropion xl 300 mg , daily each morning for depression   Continue and increase buspar 10 mg 2 tablets twice a day for anxiety and adjunct treatment for depression   Continue hydoxyzine for anxiety 25 mg , two times a day as needed    Continue vraylar 6 mg at bedtime for depression     Continue , already on lamotrigine for central nerve pain per neurology , this can also help with bipolar II disorder , 200 mg twice a day       Review with patient: Treatment plan reviewed with the patient.  Medication risks/benefit reviewed with the patient     HPI:  He says his mood is depressed at times with crying , says he does not think of anything in particular   He is having 3-4 panic attacks a week   Slight improvement in anxiety , with buspirone , he agrees to increase the dose , explained this can also help with depression   He smokes about 18 cigarrettes a day , almost a pack a day   He says he does not have a car    He used to enjoy reading , mainly comedies   He feels like he has some support in his life , he has 2 older brothers   He is in aa and has friends in aa  He goes to meetings for aa every night , he gets a ride   He says he gets nervous at meetings , still pushes himself to go   He has been sober for 8 years    He says his short term memory and long term memory are poor , he says this does not pose any issues with day to day activities   He usually remembers to take his medications , he does not use pill boxes says he can forget to take them once a  week   He was seeing a psychiatrist at [facility] in [place] , he was seen there for a year or two he does not remember much about that place he says    He has been on bupropion for a year or 2 ago when he was really depressed and anxious , he says he gets very nervous and shaky inside   He has been on vraylar since nov 2023 and this has helped some   He feels the need to leave a setting suddenly when he gets suddenly anxious   He takes propranolol for hypertension , he says this helps his anxiety also, and he also takes hydroxyzine as needed for anxiety   He has been on lamotrigine for central nerve pain per neurology   He is diagnosed with bipolar 2 disorder , he had a manic episode a long time ago , he does not remember when   Has a history of psych hospitalization in 2012 for suicidal ideation   He sees a therapist for counseling at [facility]  Previous psych meds :  Depakote : does not remember the response   Remeron : too sedating   Zoloft has been taken , he says he did well on this   Prozac : does not remember the response   He sleeps about 8-9 hours a night , he naps twice a day for an hour each time   Support provided for stressors      Psych ros and medical ros as noted above          Patient Active Problem List   Diagnosis    Atrioventricular block, complete (Multi)    Biceps tendonitis    Cardiac pacemaker    Cardiomyopathy    CHD (congenital heart disease)    Central pain syndrome    Cognitive deficits as late effect of cerebrovascular disease    Chronic fatigue    Complaint of memory disorder without observed objective memory deficit    Congenital heart block    Current every day smoker    Decreased coordination    Stress reaction, chronic    Dyspnea on exertion    Erectile dysfunction    Gait, antalgic    Gastroesophageal reflux disease without esophagitis    Primary hypertension    Hyperhidrosis of soles    Hyperkeratosis    Left ventricular systolic dysfunction    Low testosterone     Neuropathy    Polyp of colon    Rotator cuff syndrome of right shoulder    Sarcoidosis    Paroxysmal atrial fibrillation (Multi)    Sinus node dysfunction (Multi)    Sleep apnea    Sleep disturbance    Stroke (Multi)    Type 2 diabetes mellitus with hyperglycemia, without long-term current use of insulin    Verruca plana    Xerosis of skin    Acute hepatitis C virus infection without hepatic coma    Attention deficit hyperactivity disorder, inattentive type    Congenital nystagmus    Decreased strength    Major depressive disorder, recurrent severe without psychotic features (Multi)    Insomnia    Neuropathic pain    Prediabetes    Pseudophakia    PVT (portal vein thrombosis)    Refractive error    Regular astigmatism of both eyes    Diabetes mellitus without complication    Unsteady gait    Visual impairment    Word finding difficulty    Acute bronchitis due to other specified organisms    Chronic cough    High risk medication use    BMI 27.0-27.9,adult    Encounter for medication review and counseling    Encounter to discuss treatment options    Left ventricular systolic dysfunction (LVSD)    Obesity    Keratosis    Obesity with body mass index 30 or greater    Primary focal hyperhidrosis of soles    Antalgic gait    Gastroesophageal reflux disease    Smokes tobacco daily    Impaired cognition    Headache    Fatigue    Hyperglycemia    Disorder of rotator cuff    Amnesia    Cognitive deficit due to and not concurrent with cerebrovascular disease    Acute prostatitis    Attention deficit hyperactivity disorder (ADHD), predominantly inattentive type    Decreased testosterone level    Atrial fibrillation (Multi)    Hyperplastic polyp of large intestine    Hypertension    Portal vein thrombosis    Reactive airway disease ([facility])    Disturbance in sleep behavior    Disorder of refraction    Reaction to chronic stress    Depressive disorder    Type 2 diabetes mellitus    Asteatosis cutis    Abnormal gait    Mild  vascular dementia without behavioral disturbance, psychotic disturbance, mood disturbance, or anxiety    Hemiplegia and hemiparesis following cerebral infarction affecting left non-dominant side (Multi)    Pacemaker at end of battery life     Current Outpatient Medications:     albuterol (Proventil HFA) 90 mcg/actuation inhaler, Inhale 2 puffs every 4 hours if needed for wheezing or shortness of breath., Disp: 18 g, Rfl: 0    atorvastatin (Lipitor) 10 mg tablet, Take 1 tablet (10 mg) by mouth once daily., Disp: 90 tablet, Rfl: 0    buPROPion XL (Wellbutrin XL) 300 mg 24 hr tablet, Take 1 tablet (300 mg) by mouth once daily in the morning. Take before meals., Disp: 90 tablet, Rfl: 0    busPIRone (Buspar) 10 mg tablet, Take 1.5 tablets (15 mg) by mouth 2 times a day. Does not need a script at this time, Disp: , Rfl:     cariprazine (Vraylar) 6 mg capsule, Take 1 capsule (6 mg) by mouth once daily., Disp: 90 capsule, Rfl: 0    Eliquis 5 mg tablet, Take 1 tablet (5 mg) by mouth 2 times a day. HOLD. Please resume on 3/2/2025., Disp: , Rfl:     empagliflozin (Jardiance) 25 mg, Take 1 tablet (25 mg) by mouth once daily., Disp: 90 tablet, Rfl: 3    furosemide (Lasix) 20 mg tablet, Take 1 tablet (20 mg) by mouth once daily., Disp: 90 tablet, Rfl: 3    gabapentin (Neurontin) 400 mg capsule, Take 1 capsule (400 mg) by mouth 2 times a day., Disp: 180 capsule, Rfl: 1    hydrOXYzine HCL (Atarax) 25 mg tablet, Take 1 tablet (25 mg) by mouth 2 times a day. prn, Disp: 180 tablet, Rfl: 0    Januvia 100 mg tablet, Take 1 tablet (100 mg) by mouth once daily., Disp: 90 tablet, Rfl: 0    lamoTRIgine (LaMICtal) 200 mg tablet, TAKE 1 TABLET BY MOUTH TWICE DAILY, Disp: 60 tablet, Rfl: 2    melatonin 5 mg capsule, Take 1 capsule (5 mg) by mouth once daily at bedtime. Takes 1-2 capsules each night at bedtime, Disp: , Rfl:     metFORMIN (Glucophage) 1,000 mg tablet, Take 1 tablet (1,000 mg) by mouth 2 times daily (morning and late afternoon).,  Disp: 180 tablet, Rfl: 3    propranolol (Inderal) 40 mg tablet, Take 1 tablet (40 mg) by mouth 3 times a day., Disp: 90 tablet, Rfl: 2    tadalafil (Cialis) 20 mg tablet, Take 1 tablet (20 mg) by mouth once daily as needed for erectile dysfunction., Disp: 10 tablet, Rfl: 2    tamsulosin (Flomax) 0.4 mg 24 hr capsule, Take 1 capsule (0.4 mg) by mouth once daily., Disp: 30 capsule, Rfl: 11    telmisartan (Micardis) 20 mg tablet, Take 1 tablet (20 mg) by mouth once daily., Disp: 30 tablet, Rfl: 11  Medical History:  Past Medical History:   Diagnosis Date    Acute bronchitis 02/08/2023    Anxiety     Atrioventricular block, complete (Multi) 07/12/2022    Atrioventricular block, complete    Cough 02/08/2023    Essential (primary) hypertension 05/24/2022    HTN (hypertension)    Foot pain, bilateral 02/08/2023    Hemiplegia, unspecified affecting left nondominant side (Multi) 08/20/2020    Left hemiparesis    Hemiplegia, unspecified affecting left nondominant side (Multi) 08/20/2020    Left hemiparesis    Hemiplegia, unspecified affecting left nondominant side (Multi) 08/20/2020    Left hemiparesis    Hemiplegia, unspecified affecting left nondominant side (Multi) 08/20/2020    Left hemiparesis    History of bipolar disorder 11/01/2019    Last Assessment & Plan: Formatting of this note might be different from the original. Assessment: Stable at this time On multiple medications Follows with Psychiatry    History of sick sinus syndrome 11/01/2019    History of stroke 01/03/2020    Obstructive and reflux uropathy, unspecified 04/07/2017    Obstructive uropathy    Onychomycosis 02/08/2023    Other conditions influencing health status 08/20/2020    Stroke syndrome    Other sleep disorders 03/28/2014    Sleep paralysis    Panic attack     Peripheral neuropathy     Personal history of (corrected) congenital malformations of heart and circulatory system 01/25/2022    History of congenital heart block    Personal history of  colonic polyps 07/20/2020    Personal history of colonic polyps    Personal history of other endocrine, nutritional and metabolic disease 09/15/2015    History of hyperlipidemia    Personal history of other specified conditions 09/03/2020    History of memory loss    Presence of cardiac pacemaker 07/12/2022    Cardiac pacemaker    Sleep difficulties     Substance abuse (Multi)     Suicide attempt (Multi)     Unspecified viral hepatitis C without hepatic coma 07/21/2016    Viral hepatitis C without hepatic coma    URI (upper respiratory infection) 02/08/2023     Surgical History:  Past Surgical History:   Procedure Laterality Date    APPENDECTOMY      APPENDECTOMY  04/27/2018    Appendectomy    CARDIAC ELECTROPHYSIOLOGY PROCEDURE Left 2/27/2025    Procedure: PPM BIV CHANGE OUT;  Surgeon: Opal Blue MD;  Location: [facility];  Service: Electrophysiology;  Laterality: Left;  HOLD ELIQUIS 2D/JARDIANCE 3D/JANUVIA 3/METFORMIN AM OF    CARDIAC PACEMAKER PLACEMENT  07/09/2018    Pacemaker Placement    MEDIASTINAL MASS EXCISION      OTHER SURGICAL HISTORY  08/24/2020    Colonoscopy complete for polypectomy    OTHER SURGICAL HISTORY  07/20/2020    Colonoscopy    OTHER SURGICAL HISTORY  04/07/2017    Patent Ductus Arteriosus Repair    OTHER SURGICAL HISTORY  04/07/2017    Mediastinoscopy With Biopsy Of Mediastinal Mass    PATENT DUCTUS ARTERIOUS LIGATION       Family History:  Family History   Problem Relation Name Age of Onset    Stroke Mother      Cancer Mother      Other (cardiac disorder) Mother      Lymphoma Mother      Lung cancer Mother      Colon cancer Mother      Liver cancer Mother      Colon cancer Father      Diabetes Father      Stroke Father      Other (Other) Father      Cancer Father      Ulcerative colitis Brother       Social History:  Social History     Socioeconomic History    Marital status:      Spouse name: Not on file    Number of children: Not on file    Years of education: Not on  file    Highest education level: Not on file   Occupational History    Not on file   Tobacco Use    Smoking status: Every Day     Current packs/day: 1.00     Types: Cigarettes     Passive exposure: Never    Smokeless tobacco: Former     Types: Chew   Vaping Use    Vaping status: Every Day   Substance and Sexual Activity    Alcohol use: Never    Drug use: Never    Sexual activity: Defer   Other Topics Concern    Not on file   Social History Narrative    Not on file     Social Drivers of Health     Financial Resource Strain: Not on file   Food Insecurity: No Food Insecurity (11/15/2023)    Received from [facility]    [facility] Brief Food     Brief social Worry about food: Not on file     Brief social Food run out: Not on file   Transportation Needs: No Transportation Needs (11/15/2023)    Received from [facility]    [facility] Brief Transportation     Brief social Transport to appt: Not on file     Brief social Transport to work: Not on file   Physical Activity: Not on file   Stress: Not on file   Social Connections: Not on file   Intimate Partner Violence: Not on file   Housing Stability: Low Risk  (11/15/2023)    Received from [facility]    [facility] Brief Housing     Brief social Pay for mortgage/rent: Not on file     Brief social: Place to sleep: Not on file     Vitals:  There were no vitals filed for this visit.    Charmaine Irving, APRN-CNS

## 2025-04-07 ENCOUNTER — APPOINTMENT (OUTPATIENT)
Dept: PRIMARY CARE | Facility: CLINIC | Age: 55
End: 2025-04-07
Payer: COMMERCIAL

## 2025-04-08 NOTE — PROGRESS NOTES
"Subjective   Reason for Visit: Brandt Hdz is an 54 y.o. y.o. male here for a Medicare Wellness visit.            HPI    Patient Care Team:  Mani Fortune MD as PCP - General  Mani Fortune MD as PCP - [facility] PCP     Review of Systems    Objective   Vitals:  /77 (BP Location: Right arm, BP Cuff Size: Large adult)   Pulse 84   Ht 1.676 m (5' 6\")   Wt 76.6 kg (168 lb 12.8 oz)   SpO2 97%   BMI 27.25 kg/m²       Physical Exam    Assessment/Plan   Problem List Items Addressed This Visit    None  Patient education provided.  Stay current with age appropriate health maintenance as instructed.  Appointment here or ER with new or worsening symptoms'  Keep appropriate follow-up visit.  Stay current with proper immunizations   "

## 2025-04-18 ENCOUNTER — APPOINTMENT (OUTPATIENT)
Dept: PRIMARY CARE | Facility: CLINIC | Age: 55
End: 2025-04-18
Payer: COMMERCIAL

## 2025-04-18 VITALS
HEART RATE: 84 BPM | DIASTOLIC BLOOD PRESSURE: 77 MMHG | WEIGHT: 168.8 LBS | OXYGEN SATURATION: 97 % | SYSTOLIC BLOOD PRESSURE: 114 MMHG | HEIGHT: 66 IN | BODY MASS INDEX: 27.13 KG/M2

## 2025-04-18 DIAGNOSIS — I48.91 ATRIAL FIBRILLATION, UNSPECIFIED TYPE (MULTI): ICD-10-CM

## 2025-04-18 DIAGNOSIS — E11.3291 TYPE 2 DIABETES MELLITUS WITH RIGHT EYE AFFECTED BY MILD NONPROLIFERATIVE RETINOPATHY WITHOUT MACULAR EDEMA, WITHOUT LONG-TERM CURRENT USE OF INSULIN: ICD-10-CM

## 2025-04-18 DIAGNOSIS — G62.9 NEUROPATHY: ICD-10-CM

## 2025-04-18 DIAGNOSIS — I10 PRIMARY HYPERTENSION: ICD-10-CM

## 2025-04-18 DIAGNOSIS — Z00.00 ROUTINE GENERAL MEDICAL EXAMINATION AT HEALTH CARE FACILITY: Primary | ICD-10-CM

## 2025-04-18 DIAGNOSIS — F41.9 ANXIETY: ICD-10-CM

## 2025-04-18 DIAGNOSIS — F33.2 MAJOR DEPRESSIVE DISORDER, RECURRENT SEVERE WITHOUT PSYCHOTIC FEATURES (MULTI): ICD-10-CM

## 2025-04-18 DIAGNOSIS — F01.A0 MILD VASCULAR DEMENTIA WITHOUT BEHAVIORAL DISTURBANCE, PSYCHOTIC DISTURBANCE, MOOD DISTURBANCE, OR ANXIETY: ICD-10-CM

## 2025-04-18 DIAGNOSIS — K21.9 GASTROESOPHAGEAL REFLUX DISEASE WITHOUT ESOPHAGITIS: ICD-10-CM

## 2025-04-18 RX ORDER — BUSPIRONE HYDROCHLORIDE 10 MG/1
20 TABLET ORAL 2 TIMES DAILY
Qty: 360 TABLET | Refills: 0 | Status: SHIPPED | OUTPATIENT
Start: 2025-04-18 | End: 2025-07-17

## 2025-04-18 ASSESSMENT — ENCOUNTER SYMPTOMS
DEPRESSION: 0
NUMBNESS: 0
BRUISES/BLEEDS EASILY: 0
NERVOUS/ANXIOUS: 0
CONSTIPATION: 0
SHORTNESS OF BREATH: 0
HEMATURIA: 0
COUGH: 0
LOSS OF SENSATION IN FEET: 0
ADENOPATHY: 0
ARTHRALGIAS: 0
ACTIVITY CHANGE: 0
DIFFICULTY URINATING: 0
DYSPHORIC MOOD: 0
CHEST TIGHTNESS: 0
NECK PAIN: 0
ABDOMINAL PAIN: 0
MYALGIAS: 0
DIARRHEA: 0
NAUSEA: 0
FATIGUE: 0
WEAKNESS: 0
HEADACHES: 0
DIZZINESS: 0
BACK PAIN: 0
SORE THROAT: 0
OCCASIONAL FEELINGS OF UNSTEADINESS: 0
BLOOD IN STOOL: 0
VOMITING: 0
EYE DISCHARGE: 0

## 2025-04-18 ASSESSMENT — ACTIVITIES OF DAILY LIVING (ADL)
BATHING: INDEPENDENT
DOING_HOUSEWORK: INDEPENDENT
TAKING_MEDICATION: INDEPENDENT
DRESSING: INDEPENDENT
MANAGING_FINANCES: INDEPENDENT
GROCERY_SHOPPING: INDEPENDENT

## 2025-04-18 NOTE — PROGRESS NOTES
Subjective   Reason for Visit: Brandt Hdz is an 54 y.o. male here for a Medicare Wellness visit.     Past Medical, Surgical, and Family History reviewed and updated in chart.    Reviewed all medications by prescribing practitioner or clinical pharmacist (such as prescriptions, OTCs, herbal therapies and supplements) and documented in the medical record.    HPI  Patient here for Medicare wellness exam    Also general checkup    Neuropathy stable  No progression or worsening  No changes in his symptoms    Anxiety stable  Depression stable  No suicidal ideation no psychotic or manic symptoms  Patient undergoing counseling    Diabetes stable  Watch diet follow blood work  Stay current with diabetic health maintenance  Recommend diabetic eye exam annually as well    Vascular dementia stable no progression or worsening    GERD stable  No red flag symptoms  No further GI workup indicated at this time    Hypertension stable no chest pain or shortness of breath  Tolerates medicine well    A-fib stable  Keep cardiac follow-up    Reviewed medications with patient  No further edema and blood pressure is excellent so we will attempt to discontinue furosemide  All of the medications were reviewed  Patient Care Team:  Mani Fortune MD as PCP - General  Opal Blue MD as Cardiologist (Electrophysiology)  Isidro Renner MD as Consulting Physician (Cardiology)  Sharonda Espinoza RN as Registered Nurse (Cardiology)     Review of Systems   Constitutional:  Negative for activity change and fatigue.   HENT:  Negative for congestion and sore throat.    Eyes:  Negative for discharge.   Respiratory:  Negative for cough, chest tightness and shortness of breath.    Cardiovascular:  Negative for chest pain and leg swelling.   Gastrointestinal:  Negative for abdominal pain, blood in stool, constipation, diarrhea, nausea and vomiting.   Endocrine: Negative for cold intolerance and heat intolerance.   Genitourinary:  Negative for  "difficulty urinating and hematuria.   Musculoskeletal:  Negative for arthralgias, back pain, gait problem, myalgias and neck pain.   Allergic/Immunologic: Negative for environmental allergies.   Neurological:  Negative for dizziness, syncope, weakness, numbness and headaches.   Hematological:  Negative for adenopathy. Does not bruise/bleed easily.   Psychiatric/Behavioral:  Negative for dysphoric mood. The patient is not nervous/anxious.    All other systems reviewed and are negative.      Objective   Vitals:  /77 (BP Location: Right arm, BP Cuff Size: Large adult)   Pulse 84   Ht 1.676 m (5' 6\")   Wt 76.6 kg (168 lb 12.8 oz)   SpO2 97%   BMI 27.25 kg/m²       Physical Exam  Vitals and nursing note reviewed.   Constitutional:       General: He is not in acute distress.     Appearance: Normal appearance.   HENT:      Head: Normocephalic and atraumatic.      Right Ear: Tympanic membrane, ear canal and external ear normal.      Left Ear: Tympanic membrane, ear canal and external ear normal.      Nose: Nose normal.      Mouth/Throat:      Mouth: Mucous membranes are moist.      Pharynx: Oropharynx is clear. No oropharyngeal exudate or posterior oropharyngeal erythema.   Eyes:      Extraocular Movements: Extraocular movements intact.      Conjunctiva/sclera: Conjunctivae normal.      Pupils: Pupils are equal, round, and reactive to light.   Cardiovascular:      Rate and Rhythm: Normal rate and regular rhythm.      Pulses: Normal pulses.      Heart sounds: Normal heart sounds. No murmur heard.  Pulmonary:      Effort: Pulmonary effort is normal. No respiratory distress.      Breath sounds: Normal breath sounds. No wheezing or rales.   Abdominal:      General: Abdomen is flat. Bowel sounds are normal. There is no distension.      Palpations: Abdomen is soft. There is no mass.      Tenderness: There is no abdominal tenderness.   Musculoskeletal:         General: No swelling or deformity. Normal range of motion. "      Cervical back: Normal range of motion and neck supple.      Right lower leg: No edema.      Left lower leg: No edema.   Lymphadenopathy:      Cervical: No cervical adenopathy.   Skin:     General: Skin is warm and dry.      Capillary Refill: Capillary refill takes less than 2 seconds.      Findings: No lesion or rash.   Neurological:      General: No focal deficit present.      Mental Status: He is alert and oriented to person, place, and time.      Cranial Nerves: No cranial nerve deficit.      Motor: No weakness.   Psychiatric:         Mood and Affect: Mood normal.         Behavior: Behavior normal.         Thought Content: Thought content normal.         Judgment: Judgment normal.         Assessment & Plan  Routine general medical examination at health care facility    Orders:    1 Year Follow Up In [facility] - PCP - Wellness Exam; Future    Neuropathy    Orders:    Follow Up In [facility] - PCP    Anxiety    Orders:    busPIRone (Buspar) 10 mg tablet; Take 2 tablets (20 mg) by mouth 2 times a day.    Type 2 diabetes mellitus with right eye affected by mild nonproliferative retinopathy without macular edema, without long-term current use of insulin         Mild vascular dementia without behavioral disturbance, psychotic disturbance, mood disturbance, or anxiety         Major depressive disorder, recurrent severe without psychotic features (Multi)         Gastroesophageal reflux disease without esophagitis         Primary hypertension         Atrial fibrillation, unspecified type (Multi)            ACP reviewed  Keep specialist follow-up  Report suicidal ideation report psychotic or manic symptoms  Follow-up visit 3 months and as needed    Patient education provided.  Stay current with age appropriate health maintenance as instructed.  Appointment here or ER with new or worsening symptoms'  Keep appropriate follow-up visit.  Stay current with proper immunizations

## 2025-05-01 ENCOUNTER — APPOINTMENT (OUTPATIENT)
Dept: BEHAVIORAL HEALTH | Facility: CLINIC | Age: 55
End: 2025-05-01
Payer: COMMERCIAL

## 2025-05-01 DIAGNOSIS — F41.9 ANXIETY: ICD-10-CM

## 2025-05-01 PROCEDURE — 99212 OFFICE O/P EST SF 10 MIN: CPT | Performed by: CLINICAL NURSE SPECIALIST

## 2025-05-01 PROCEDURE — 4010F ACE/ARB THERAPY RXD/TAKEN: CPT | Performed by: CLINICAL NURSE SPECIALIST

## 2025-05-01 RX ORDER — BUSPIRONE HYDROCHLORIDE 10 MG/1
TABLET ORAL
Qty: 450 TABLET | Refills: 0 | Status: SHIPPED | OUTPATIENT
Start: 2025-05-01

## 2025-05-01 NOTE — PROGRESS NOTES
Outpatient Psychiatry      Subjective   Brandt Hdz, a 54 y.o. male, presents for a virtual audio and video virtual appointment, he was at home for this appointment  Patient is referred by Mani Fortune MD .    Virtual or Telephone Consent     An interactive audio and video telecommunication system which permits real time communications between the patient (at the originating site) and provider (at the distant site) was utilized to provide this telehealth service.   Verbal consent was requested and obtained from Brandt Hdz on this date, 5/1/25 for a telehealth visit.       Diagnoses : recurrent major depressive disorder moderate F33.1  Generalized anxiety disorder F 41.1 moderate  Bipolar II disorder moderate F 31.81     Problem List         Patient Active Problem List   Diagnosis    Atrioventricular block, complete (Multi)    Biceps tendonitis    Cardiac pacemaker    Cardiomyopathy (Multi)    CHD (congenital heart disease) ([facility])    Central pain syndrome    Cognitive deficits as late effect of cerebrovascular disease    Chronic fatigue    Complaint of memory disorder without observed objective memory deficit    Congenital heart block ([facility])    Congenital heart failure (Multi)    Current every day smoker    Decreased coordination    Stress reaction, chronic    Depression, major, in partial remission ([facility])    Dyspnea on exertion    Erectile dysfunction    Gait, antalgic    Gastroesophageal reflux disease without esophagitis    Primary hypertension    Hyperhidrosis of soles    Hyperkeratosis    Left ventricular systolic dysfunction    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)    Low testosterone    Neuropathy    PDA (patent ductus arteriosus) ([facility])    Polyp of colon    Rotator cuff syndrome of right shoulder    Sarcoidosis    Paroxysmal atrial fibrillation (Multi)    Sinus node dysfunction (Multi)    RAD (reactive airway disease) ([facility])    Sleep apnea     Sleep disturbance    Stroke (Multi)    Type 2 diabetes mellitus with hyperglycemia, without long-term current use of insulin (Multi)    Verruca plana    Xerosis of skin    Acute hepatitis C virus infection without hepatic coma    Attention deficit hyperactivity disorder, inattentive type    Congenital nystagmus    Decreased strength    Major depressive disorder, recurrent severe without psychotic features (Multi)    Insomnia    Neuropathic pain    Prediabetes    Pseudophakia    PVT (portal vein thrombosis)    Refractive error    Regular astigmatism of both eyes    Diabetes mellitus without complication (Multi)    Unsteady gait    Visual impairment    Word finding difficulty    Chronic painful diabetic neuropathy (Multi)    Acute bronchitis due to other specified organisms    Chronic cough    High risk medication use    BMI 29.0-29.9,adult    Encounter for medication review and counseling    Encounter to discuss treatment options    Partial seizure disorder (Multi)    Focal epilepsy (Multi)    Left ventricular systolic dysfunction (LVSD)    Obesity    Keratosis    Obesity with body mass index 30 or greater    Primary focal hyperhidrosis of soles    Antalgic gait    Gastroesophageal reflux disease    Smokes tobacco daily    Impaired cognition    Headache    Fatigue    Hyperglycemia    Disorder of rotator cuff    Amnesia    Cognitive deficit due to and not concurrent with cerebrovascular disease    Acute prostatitis    Attention deficit hyperactivity disorder (ADHD), predominantly inattentive type    Decreased testosterone level    Atrial fibrillation (Multi)    Hyperplastic polyp of large intestine    Hypertension    Portal vein thrombosis    Reactive airway disease ([facility])    Disturbance in sleep behavior    Disorder of refraction    Reaction to chronic stress    Depressive disorder    Type 2 diabetes mellitus (Multi)    Asteatosis cutis    Abnormal gait    Bipolar II disorder (Multi)    Mild vascular dementia  without behavioral disturbance, psychotic disturbance, mood disturbance, or anxiety (Multi)    Heart failure with recovered ejection fraction (HFrecEF) (Multi)         Treatment Plan/Recommendations:   Follow-up plan for depression was discussed with patient.  Follow up in 4-6 weeks   Can call  for treatment and scheduling concerns   Can continue self care and wellness efforts and maintain routine health screenings  Psychotropic medications :  Continue bupropion xl 300 mg , daily each morning for depression   Continue and increase buspar 10 mg 2 tablets each morning and 2 tablets each afternoon and 1 tablet each night at bedtime  for anxiety and adjunct treatment for depression   Continue hydoxyzine for anxiety 25 mg , two times a day as needed    Continue vraylar 6 mg at bedtime for depression     Continue  (already on per another medial provider ) lamotrigine for central nerve pain per neurology , this can also help with bipolar II disorder , 200 mg twice a day       Review with patient: Treatment plan reviewed with the patient.  Medication risks/benefit reviewed with the patient     HPI:  He says his mood is still depressed at times with crying , says he does not think of anything in particular   He is having 3-4 panic attacks a week   Slight improvement in anxiety , with buspirone , he agrees to increase the dose , explained this can also help with depression   He smokes about 18 cigarrettes a day , almost a pack a day   He says he does not have a car    He used to enjoy reading , mainly comedies   He feels like he has some support in his life , he has 2 older brothers   He is in [place] and has friends in aa  He goes to meetings for aa every night , he gets a ride   He says he gets nervous at meetings , still pushes himself to go   He has been sober for 8 years    He says his short term memory and long term memory are poor , he says this does not pose any issues with day to day activities   He usually  remembers to take his medications , he does not use pill boxes says he can forget to take them once a week   He was seeing a psychiatrist at [facility] in [place] , he was seen there for a year or two he does not remember much about that place he says    He has been on bupropion for a year or 2 ago when he was really depressed and anxious , he says he gets very nervous and shaky inside   He has been on vraylar since nov 2023 and this has helped some   He feels the need to leave a setting suddenly when he gets suddenly anxious   He takes propranolol for hypertension , he says this helps his anxiety also, and he also takes hydroxyzine as needed for anxiety   He has been on lamotrigine for central nerve pain per neurology   He is diagnosed with bipolar 2 disorder , he had a manic episode a long time ago , he does not remember when   Has a history of psych hospitalization in 2012 for suicidal ideation   He sees a therapist for counseling at [facility]  Previous psych meds :  Depakote : does not remember the response   Remeron : too sedating   Zoloft has been taken , he says he did well on this   Prozac : does not remember the response   He sleeps about 8-9 hours a night , he naps twice a day for an hour each time   Support provided for stressors  He had his medicare wellness visit a few weeks ago , reviewed note in the [facility] emr       Psych ros and medical ros as noted above      Social History     Socioeconomic History    Marital status:      Spouse name: Not on file    Number of children: Not on file    Years of education: Not on file    Highest education level: Not on file   Occupational History    Not on file   Tobacco Use    Smoking status: Every Day     Current packs/day: 1.00     Types: Cigarettes     Passive exposure: Never    Smokeless tobacco: Former     Types: Chew   Vaping Use    Vaping status: Every Day   Substance and Sexual Activity    Alcohol use: Never    Drug use: Never    Sexual  activity: Defer   Other Topics Concern    Not on file   Social History Narrative    Not on file     Social Drivers of Health     Financial Resource Strain: Not on file   Food Insecurity: No Food Insecurity (11/15/2023)    Received from [facility]    [facility] Food     Brief social Worry about food: Not on file     Brief social Food run out: Not on file   Transportation Needs: No Transportation Needs (11/15/2023)    Received from [facility]    [facility] Brief Transportation     Brief social Transport to appt: Not on file     Brief social Transport to work: Not on file   Physical Activity: Not on file   Stress: Not on file   Social Connections: Not on file   Intimate Partner Violence: Not on file   Housing Stability: Low Risk  (11/15/2023)    Received from [facility]    [facility] Housing     Brief social Pay for mortgage/rent: Not on file     Brief social: Place to sleep: Not on file     Vitals:  There were no vitals filed for this visit.    Charmaine Irving, APRN-CNS

## 2025-05-04 DIAGNOSIS — I48.0 PAROXYSMAL ATRIAL FIBRILLATION (MULTI): ICD-10-CM

## 2025-05-05 RX ORDER — APIXABAN 5 MG/1
5 TABLET, FILM COATED ORAL 2 TIMES DAILY
Qty: 180 TABLET | Refills: 1 | Status: SHIPPED | OUTPATIENT
Start: 2025-05-05

## 2025-05-27 ENCOUNTER — APPOINTMENT (OUTPATIENT)
Dept: BEHAVIORAL HEALTH | Facility: CLINIC | Age: 55
End: 2025-05-27
Payer: COMMERCIAL

## 2025-05-27 DIAGNOSIS — F33.2 MAJOR DEPRESSIVE DISORDER, RECURRENT SEVERE WITHOUT PSYCHOTIC FEATURES (MULTI): ICD-10-CM

## 2025-05-27 DIAGNOSIS — F31.81 BIPOLAR II DISORDER (MULTI): ICD-10-CM

## 2025-05-27 DIAGNOSIS — F41.1 GAD (GENERALIZED ANXIETY DISORDER): ICD-10-CM

## 2025-05-27 PROCEDURE — 99213 OFFICE O/P EST LOW 20 MIN: CPT | Performed by: CLINICAL NURSE SPECIALIST

## 2025-05-27 RX ORDER — HYDROXYZINE HYDROCHLORIDE 25 MG/1
25 TABLET, FILM COATED ORAL 2 TIMES DAILY
Qty: 180 TABLET | Refills: 0 | Status: SHIPPED | OUTPATIENT
Start: 2025-05-27 | End: 2025-08-25

## 2025-05-27 RX ORDER — BUSPIRONE HYDROCHLORIDE 10 MG/1
TABLET ORAL
Qty: 450 TABLET | Refills: 0 | Status: SHIPPED | OUTPATIENT
Start: 2025-05-27

## 2025-05-27 RX ORDER — BUPROPION HYDROCHLORIDE 300 MG/1
300 TABLET ORAL
Qty: 90 TABLET | Refills: 0 | Status: SHIPPED | OUTPATIENT
Start: 2025-05-27 | End: 2025-08-25

## 2025-05-27 NOTE — PROGRESS NOTES
Outpatient Psychiatry      Subjective   Brandt Hdz, a 54 y.o. male, presents for a virtual audio and video virtual appointment, he was at home for this appointment  Patient is referred by Mani Fortune MD .    Virtual or Telephone Consent     An interactive audio and video telecommunication system which permits real time communications between the patient (at the originating site) and provider (at the distant site) was utilized to provide this telehealth service.   Verbal consent was requested and obtained from Brandt Hdz on this date, 5/27/25 for a telehealth visit.       Diagnoses : recurrent major depressive disorder moderate F33.1  Generalized anxiety disorder F 41.1 moderate secondary   Bipolar II disorder moderate F 31.81 primary      Problem List         Patient Active Problem List   Diagnosis    Atrioventricular block, complete (Multi)    Biceps tendonitis    Cardiac pacemaker    Cardiomyopathy (Multi)    CHD (congenital heart disease) ([facility])    Central pain syndrome    Cognitive deficits as late effect of cerebrovascular disease    Chronic fatigue    Complaint of memory disorder without observed objective memory deficit    Congenital heart block ([facility])    Congenital heart failure (Multi)    Current every day smoker    Decreased coordination    Stress reaction, chronic    Depression, major, in partial remission ([facility])    Dyspnea on exertion    Erectile dysfunction    Gait, antalgic    Gastroesophageal reflux disease without esophagitis    Primary hypertension    Hyperhidrosis of soles    Hyperkeratosis    Left ventricular systolic dysfunction    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)    Low testosterone    Neuropathy    PDA (patent ductus arteriosus) ([facility])    Polyp of colon    Rotator cuff syndrome of right shoulder    Sarcoidosis    Paroxysmal atrial fibrillation (Multi)    Sinus node dysfunction (Multi)    RAD (reactive airway disease)  ([facility])    Sleep apnea    Sleep disturbance    Stroke (Multi)    Type 2 diabetes mellitus with hyperglycemia, without long-term current use of insulin (Multi)    Verruca plana    Xerosis of skin    Acute hepatitis C virus infection without hepatic coma    Attention deficit hyperactivity disorder, inattentive type    Congenital nystagmus    Decreased strength    Major depressive disorder, recurrent severe without psychotic features (Multi)    Insomnia    Neuropathic pain    Prediabetes    Pseudophakia    PVT (portal vein thrombosis)    Refractive error    Regular astigmatism of both eyes    Diabetes mellitus without complication (Multi)    Unsteady gait    Visual impairment    Word finding difficulty    Chronic painful diabetic neuropathy (Multi)    Acute bronchitis due to other specified organisms    Chronic cough    High risk medication use    BMI 29.0-29.9,adult    Encounter for medication review and counseling    Encounter to discuss treatment options    Partial seizure disorder (Multi)    Focal epilepsy (Multi)    Left ventricular systolic dysfunction (LVSD)    Obesity    Keratosis    Obesity with body mass index 30 or greater    Primary focal hyperhidrosis of soles    Antalgic gait    Gastroesophageal reflux disease    Smokes tobacco daily    Impaired cognition    Headache    Fatigue    Hyperglycemia    Disorder of rotator cuff    Amnesia    Cognitive deficit due to and not concurrent with cerebrovascular disease    Acute prostatitis    Attention deficit hyperactivity disorder (ADHD), predominantly inattentive type    Decreased testosterone level    Atrial fibrillation (Multi)    Hyperplastic polyp of large intestine    Hypertension    Portal vein thrombosis    Reactive airway disease ([facility])    Disturbance in sleep behavior    Disorder of refraction    Reaction to chronic stress    Depressive disorder    Type 2 diabetes mellitus (Multi)    Asteatosis cutis    Abnormal gait    Bipolar II disorder (Multi)     Mild vascular dementia without behavioral disturbance, psychotic disturbance, mood disturbance, or anxiety (Multi)    Heart failure with recovered ejection fraction (HFrecEF) (Multi)         Treatment Plan/Recommendations:   Follow-up plan for depression was discussed with patient.  Follow up in 4-6 weeks   Can call  for treatment and scheduling concerns   Can continue self care and wellness efforts and maintain routine health screenings  Psychotropic medications :  Continue bupropion xl 300 mg , daily each morning for depression   Continue and increase buspar 10 mg 2 tablets each morning and 2 tablets each afternoon and 1 tablet each night at bedtime  for anxiety and adjunct treatment for depression   Continue hydoxyzine for anxiety 25 mg , two times a day as needed    Continue vraylar 6 mg at bedtime for depression     Continue  (already on per another medial provider ) lamotrigine for central nerve pain per neurology , this can also help with bipolar II disorder , 200 mg twice a day       Review with patient: Treatment plan reviewed with the patient.  Medication risks/benefit reviewed with the patient     HPI:  He says his mood is still depressed at times with crying , says he does not think of anything in particular   He is having 2-3 panic attacks a week   Slight improvement in anxiety , with buspirone , when this was increased in the past , he agrees to increase the dose , explained this can also help with depression   He smokes about 18 cigarrettes a day , almost a pack a day , not willing to cut back at this time   He says he does not have a car    He used to enjoy reading , mainly comedies   He feels like he has some support in his life , he has 2 older brothers   He is in aa and has friends in aa  He goes to meetings for aa every night , he gets rides to the meetings   He says he gets nervous at meetings , still pushes himself to go   He has been sober for 8 years   No substance abuse issues ,  no use of thc or marijuana or cbd products    He says his short term memory and long term memory are poor , he says this does not pose any issues with day to day activities   He usually remembers to take his medications , he does not use pill boxes says he can forget to take them once a week , and he has not been taking propranolol afternoon dose or buspar afternoon doses , suggested he set an alarm for this   He was seeing a psychiatrist at [facility] in [place] , he was seen there for a year or two he does not remember much about that place he says    He sees a psychotherapist every other week at [facility] counseling and [facility] , bk vyas , this is helpful  He has been on bupropion for a year or 2 ago when he was really depressed and anxious , he says he gets very nervous and shaky inside   He has been on vraylar since nov 2023 and this has helped some   He feels the need to leave a setting suddenly when he gets suddenly anxious   He takes propranolol for hypertension , he says this helps his anxiety also, and he also takes hydroxyzine as needed for anxiety   He has been on lamotrigine for central nerve pain per neurology   He is diagnosed with bipolar 2 disorder , he had a manic episode a long time ago , he does not remember when   Has a history of psych hospitalization in 2012 for suicidal ideation   He sees a therapist for counseling at [facility]  Previous psych meds :  Depakote : does not remember the response   Remeron : too sedating   Zoloft has been taken , he says he did well on this   Prozac : does not remember the response   He sleeps about 8-9 hours a night , he naps twice a day for an hour each time   Support provided for stressors  He had his medicare wellness visit a few weeks ago , reviewed note in the [facility] emr       Psych ros and medical ros as noted above       Social History     Socioeconomic History    Marital status:      Spouse name: Not on file    Number of  children: Not on file    Years of education: Not on file    Highest education level: Not on file   Occupational History    Not on file   Tobacco Use    Smoking status: Every Day     Current packs/day: 1.00     Types: Cigarettes     Passive exposure: Never    Smokeless tobacco: Former     Types: Chew   Vaping Use    Vaping status: Every Day   Substance and Sexual Activity    Alcohol use: Never    Drug use: Never    Sexual activity: Defer   Other Topics Concern    Not on file   Social History Narrative    Not on file     Social Drivers of Health     Financial Resource Strain: Not on file   Food Insecurity: No Food Insecurity (11/15/2023)    Received from [employer]    [facility]     Brief social Worry about food: Not on file     Brief social Food run out: Not on file   Transportation Needs: No Transportation Needs (11/15/2023)    Received from [employer]    [facility] Transportation     Brief social Transport to appt: Not on file     Brief social Transport to work: Not on file   Physical Activity: Not on file   Stress: Not on file   Social Connections: Not on file   Intimate Partner Violence: Not on file   Housing Stability: Low Risk  (11/15/2023)    Received from [employer]    [facility] Housing     Brief social Pay for mortgage/rent: Not on file     Brief social: Place to sleep: Not on file     Vitals:  There were no vitals filed for this visit.    Charmaine Irving, APRN-CNS

## 2025-06-16 DIAGNOSIS — G40.109 FOCAL EPILEPSY (MULTI): ICD-10-CM

## 2025-06-16 RX ORDER — LAMOTRIGINE 200 MG/1
TABLET ORAL 2 TIMES DAILY
Qty: 60 TABLET | Refills: 2 | Status: SHIPPED | OUTPATIENT
Start: 2025-06-16

## 2025-06-16 NOTE — TELEPHONE ENCOUNTER
Rx Refill Request     Name: Brandt Hdz  :  1970   Date of last appointment:  2025   Date of next appointment:  Visit date not found   Best number to reach patient:  [phone]

## 2025-06-26 DIAGNOSIS — I10 PRIMARY HYPERTENSION: ICD-10-CM

## 2025-06-26 DIAGNOSIS — E11.65 TYPE 2 DIABETES MELLITUS WITH HYPERGLYCEMIA, WITHOUT LONG-TERM CURRENT USE OF INSULIN: ICD-10-CM

## 2025-06-26 RX ORDER — ATORVASTATIN CALCIUM 10 MG/1
10 TABLET, FILM COATED ORAL DAILY
Qty: 90 TABLET | Refills: 0 | Status: SHIPPED | OUTPATIENT
Start: 2025-06-26

## 2025-06-26 NOTE — TELEPHONE ENCOUNTER
Rx Refill Request     Name: Brandt Hdz  :  1970   Date of last appointment:  2025   Date of next appointment:  Visit date not found   Best number to reach patient:  [phone]

## 2025-07-01 ENCOUNTER — APPOINTMENT (OUTPATIENT)
Dept: BEHAVIORAL HEALTH | Facility: CLINIC | Age: 55
End: 2025-07-01
Payer: COMMERCIAL

## 2025-07-01 DIAGNOSIS — F41.1 GAD (GENERALIZED ANXIETY DISORDER): ICD-10-CM

## 2025-07-01 DIAGNOSIS — F31.81 BIPOLAR II DISORDER (MULTI): ICD-10-CM

## 2025-07-01 DIAGNOSIS — F33.2 MAJOR DEPRESSIVE DISORDER, RECURRENT SEVERE WITHOUT PSYCHOTIC FEATURES (MULTI): ICD-10-CM

## 2025-07-01 PROCEDURE — 99212 OFFICE O/P EST SF 10 MIN: CPT | Performed by: CLINICAL NURSE SPECIALIST

## 2025-07-01 RX ORDER — BUPROPION HYDROCHLORIDE 300 MG/1
300 TABLET ORAL
Qty: 90 TABLET | Refills: 0 | Status: SHIPPED | OUTPATIENT
Start: 2025-07-01 | End: 2025-09-29

## 2025-07-01 RX ORDER — BUSPIRONE HYDROCHLORIDE 10 MG/1
20 TABLET ORAL 3 TIMES DAILY
Qty: 540 TABLET | Refills: 0 | Status: SHIPPED | OUTPATIENT
Start: 2025-07-01 | End: 2025-09-29

## 2025-07-01 RX ORDER — ACETAMINOPHEN, DIPHENHYDRAMINE HCL, PHENYLEPHRINE HCL 325; 25; 5 MG/1; MG/1; MG/1
10 TABLET ORAL NIGHTLY
COMMUNITY

## 2025-07-01 NOTE — PROGRESS NOTES
Outpatient Psychiatry      Subjective   Brandt Hdz, a 54 y.o. male, presents for a virtual audio and video virtual appointment, he was at home for this appointment  Patient is referred by Mani Fortune MD .    Virtual or Telephone Consent     An interactive audio and video telecommunication system which permits real time communications between the patient (at the originating site) and provider (at the distant site) was utilized to provide this telehealth service.   Verbal consent was requested and obtained from Brandt Hdz on this date, 7/1/25 for a telehealth visit.       Diagnoses :   major depressive disorder recurrent severe without psychotic features F33.2  Generalized anxiety disorder F 41.1 moderate secondary   Bipolar II disorder moderate F 31.81 primary      Problem List         Patient Active Problem List   Diagnosis    Atrioventricular block, complete (Multi)    Biceps tendonitis    Cardiac pacemaker    Cardiomyopathy (Multi)    CHD (congenital heart disease) ([facility])    Central pain syndrome    Cognitive deficits as late effect of cerebrovascular disease    Chronic fatigue    Complaint of memory disorder without observed objective memory deficit    Congenital heart block ([facility])    Congenital heart failure (Multi)    Current every day smoker    Decreased coordination    Stress reaction, chronic    Depression, major, in partial remission ([facility])    Dyspnea on exertion    Erectile dysfunction    Gait, antalgic    Gastroesophageal reflux disease without esophagitis    Primary hypertension    Hyperhidrosis of soles    Hyperkeratosis    Left ventricular systolic dysfunction    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)    Low testosterone    Neuropathy    PDA (patent ductus arteriosus) ([facility])    Polyp of colon    Rotator cuff syndrome of right shoulder    Sarcoidosis    Paroxysmal atrial fibrillation (Multi)    Sinus node dysfunction (Multi)    RAD  (reactive airway disease) ([facility])    Sleep apnea    Sleep disturbance    Stroke (Multi)    Type 2 diabetes mellitus with hyperglycemia, without long-term current use of insulin (Multi)    Verruca plana    Xerosis of skin    Acute hepatitis C virus infection without hepatic coma    Attention deficit hyperactivity disorder, inattentive type    Congenital nystagmus    Decreased strength    Major depressive disorder, recurrent severe without psychotic features (Multi)    Insomnia    Neuropathic pain    Prediabetes    Pseudophakia    PVT (portal vein thrombosis)    Refractive error    Regular astigmatism of both eyes    Diabetes mellitus without complication (Multi)    Unsteady gait    Visual impairment    Word finding difficulty    Chronic painful diabetic neuropathy (Multi)    Acute bronchitis due to other specified organisms    Chronic cough    High risk medication use    BMI 29.0-29.9,adult    Encounter for medication review and counseling    Encounter to discuss treatment options    Partial seizure disorder (Multi)    Focal epilepsy (Multi)    Left ventricular systolic dysfunction (LVSD)    Obesity    Keratosis    Obesity with body mass index 30 or greater    Primary focal hyperhidrosis of soles    Antalgic gait    Gastroesophageal reflux disease    Smokes tobacco daily    Impaired cognition    Headache    Fatigue    Hyperglycemia    Disorder of rotator cuff    Amnesia    Cognitive deficit due to and not concurrent with cerebrovascular disease    Acute prostatitis    Attention deficit hyperactivity disorder (ADHD), predominantly inattentive type    Decreased testosterone level    Atrial fibrillation (Multi)    Hyperplastic polyp of large intestine    Hypertension    Portal vein thrombosis    Reactive airway disease ([facility])    Disturbance in sleep behavior    Disorder of refraction    Reaction to chronic stress    Depressive disorder    Type 2 diabetes mellitus (Multi)    Asteatosis cutis    Abnormal gait     Bipolar II disorder (Multi)    Mild vascular dementia without behavioral disturbance, psychotic disturbance, mood disturbance, or anxiety (Multi)    Heart failure with recovered ejection fraction (HFrecEF) (Multi)         Treatment Plan/Recommendations:   Follow-up plan for depression was discussed with patient.  Follow up in 4-6 weeks   Can call  for treatment and scheduling concerns   Can continue self care and wellness efforts and maintain routine health screenings  Psychotropic medications :  Continue bupropion xl 300 mg , daily each morning for depression   Continue and increase buspar 10 mg 2 tablets each morning and 2 tablets each afternoon and 2 tablets each night at bedtime for anxiety and adjunct treatment for depression   Continue hydoxyzine for anxiety 25 mg , two times a day as needed    Continue vraylar 6 mg at bedtime for depression     Continue  (already on per another medial provider ) lamotrigine for central nerve pain per neurology , this can also help with bipolar II disorder , 200 mg twice a day       Review with patient: Treatment plan reviewed with the patient.  Medication risks/benefit reviewed with the patient     HPI:  He says his mood is still depressed at times with crying , says he does not think of anything in particular when that happens  He is still having 2-3 panic attacks a week   Slight improvement in anxiety , with buspirone , when this was increased in the past , he agrees to increase the dose again to 10 mg , 2 tablets three times a day , explained this can also help with depression   He smokes about 18 cigarrettes a day , almost a pack a day , not willing to cut back at this time   He says he does not have a car    He used to enjoy reading , mainly comedies   He feels like he has some support in his life , he has 2 older brothers   He is in aa and has friends in aa  He goes to meetings for aa every night , he gets rides to the meetings   He says he gets nervous at  meetings , still pushes himself to go to them   He has been sober for 8 years   No substance abuse issues , no use of thc or marijuana or cbd products    He says his short term memory and long term memory are poor , he says this does not pose any issues with day to day activities   He usually remembers to take his medications , he does not use pill boxes says he can forget to take them once a week , and he has not been taking propranolol afternoon dose or buspar afternoon doses , suggested he set an alarm for this   Prior to seeing [facility] psychiatry during his care here , he was seeing a psychiatrist at [facility] in [place] , he was seen there for a year or two he does not remember much about that place he says    He sees a psychotherapist every other week at [facility] , bk vy[facility] , this is helpful  He has been on bupropion for a year or 2 ago when he was really depressed and anxious , he says he gets very nervous and shaky inside   He has been on vraylar since nov 2023 and this has helped some   He feels the need to leave a setting suddenly when he gets suddenly anxious   He takes propranolol for hypertension , he says this helps his anxiety also, and he also takes hydroxyzine as needed for anxiety   He has been on lamotrigine for central nerve pain per neurology   He is diagnosed with bipolar 2 disorder , he had a manic episode a long time ago , he does not remember when   Has a history of psych hospitalization in 2012 for suicidal ideation   Previous psych meds :  Depakote : does not remember the response   Remeron : too sedating   Zoloft has been taken , he says he did well on this   Prozac : does not remember the response   He sleeps about 8-9 hours a night , he naps twice a day for an hour each time   Support provided for stressors    No signs of cognitive deficits      Psych ros and medical ros as noted above       Social History     Socioeconomic History    Marital  status:      Spouse name: Not on file    Number of children: Not on file    Years of education: Not on file    Highest education level: Not on file   Occupational History    Not on file   Tobacco Use    Smoking status: Every Day     Current packs/day: 1.00     Average packs/day: 1 pack/day for 40.0 years (40.0 ttl pk-yrs)     Types: Cigarettes     Passive exposure: Never    Smokeless tobacco: Current     Types: Chew   Vaping Use    Vaping status: Every Day   Substance and Sexual Activity    Alcohol use: Yes    Drug use: Not Currently     Types: Cocaine, Heroin    Sexual activity: Not Currently     Partners: Female   Other Topics Concern    Not on file   Social History Narrative    Not on file     Social Drivers of Health     Financial Resource Strain: Not on file   Food Insecurity: No Food Insecurity (11/15/2023)    Received from [employer]    [facility] Food     Brief social Worry about food: Not on file     Brief social Food run out: Not on file   Transportation Needs: No Transportation Needs (11/15/2023)    Received from [employer]    [facility] Brief Transportation     Brief social Transport to appt: Not on file     Brief social Transport to work: Not on file   Physical Activity: Not on file   Stress: Not on file   Social Connections: Not on file   Intimate Partner Violence: Not on file   Housing Stability: Low Risk  (11/15/2023)    Received from [employer]    [facility] Housing     Brief social Pay for mortgage/rent: Not on file     Brief social: Place to sleep: Not on file     Vitals:  There were no vitals filed for this visit.    Charmaine Irving, APRN-CNS

## 2025-07-16 ENCOUNTER — HOSPITAL ENCOUNTER (OUTPATIENT)
Dept: CARDIOLOGY | Facility: HOSPITAL | Age: 55
Discharge: HOME | End: 2025-07-16
Payer: COMMERCIAL

## 2025-07-16 DIAGNOSIS — Z95.0 CARDIAC PACEMAKER IN SITU: ICD-10-CM

## 2025-07-16 DIAGNOSIS — I44.2 ATRIOVENTRICULAR BLOCK, COMPLETE (MULTI): ICD-10-CM

## 2025-07-16 PROCEDURE — 93294 REM INTERROG EVL PM/LDLS PM: CPT | Performed by: INTERNAL MEDICINE

## 2025-07-16 PROCEDURE — 93296 REM INTERROG EVL PM/IDS: CPT

## 2025-07-19 DIAGNOSIS — N40.1 BENIGN PROSTATIC HYPERPLASIA WITH URINARY FREQUENCY: ICD-10-CM

## 2025-07-19 DIAGNOSIS — I10 PRIMARY HYPERTENSION: ICD-10-CM

## 2025-07-19 DIAGNOSIS — R35.0 BENIGN PROSTATIC HYPERPLASIA WITH URINARY FREQUENCY: ICD-10-CM

## 2025-07-21 RX ORDER — TAMSULOSIN HYDROCHLORIDE 0.4 MG/1
0.4 CAPSULE ORAL DAILY
Qty: 30 CAPSULE | Refills: 0 | Status: SHIPPED | OUTPATIENT
Start: 2025-07-21

## 2025-07-21 RX ORDER — TELMISARTAN 20 MG/1
20 TABLET ORAL DAILY
Qty: 30 TABLET | Refills: 0 | Status: SHIPPED | OUTPATIENT
Start: 2025-07-21

## 2025-07-21 NOTE — TELEPHONE ENCOUNTER
Rx Refill Request     Name: Brandt Hdz  :  1970   Medication Name:      tamsulosin (Flomax) 0.4 mg 24 hr capsule   telmisartan (Micardis) 20 mg tablet   Specific Pharmacy location:  [id number] - [place], [place]   Date of last appointment:  2025   Date of next appointment:  Visit date not found   Best number to reach patient:  [phone]

## 2025-08-06 ENCOUNTER — APPOINTMENT (OUTPATIENT)
Dept: BEHAVIORAL HEALTH | Facility: CLINIC | Age: 55
End: 2025-08-06
Payer: COMMERCIAL

## 2025-08-06 ENCOUNTER — TELEPHONE (OUTPATIENT)
Dept: CARDIOLOGY | Facility: CLINIC | Age: 55
End: 2025-08-06

## 2025-08-06 DIAGNOSIS — F31.81 BIPOLAR II DISORDER (MULTI): ICD-10-CM

## 2025-08-06 DIAGNOSIS — F41.1 GAD (GENERALIZED ANXIETY DISORDER): Primary | ICD-10-CM

## 2025-08-06 DIAGNOSIS — F33.1 DEPRESSION, MAJOR, RECURRENT, MODERATE: ICD-10-CM

## 2025-08-06 PROCEDURE — 4010F ACE/ARB THERAPY RXD/TAKEN: CPT | Performed by: CLINICAL NURSE SPECIALIST

## 2025-08-06 PROCEDURE — 99212 OFFICE O/P EST SF 10 MIN: CPT | Performed by: CLINICAL NURSE SPECIALIST

## 2025-08-06 RX ORDER — BUPROPION HYDROCHLORIDE 300 MG/1
300 TABLET ORAL
Qty: 90 TABLET | Refills: 0 | Status: SHIPPED | OUTPATIENT
Start: 2025-08-06 | End: 2025-11-04

## 2025-08-06 RX ORDER — BUSPIRONE HYDROCHLORIDE 10 MG/1
20 TABLET ORAL 3 TIMES DAILY
Qty: 540 TABLET | Refills: 0 | Status: SHIPPED | OUTPATIENT
Start: 2025-08-06 | End: 2025-11-04

## 2025-08-06 NOTE — TELEPHONE ENCOUNTER
Lvm for patient to call back and reschedule device check/ follow up appointment that is currently scheduled for 8/28 @ 1:20 and 2. Betty will not be in this day. Was going to offer patient 9/16 @ 2:40 for device check and 3:20 with Dr. Blue to follow up due to her being available sooner than Betty's next available.

## 2025-08-06 NOTE — PROGRESS NOTES
Outpatient Psychiatry      Subjective   Brandt Hdz, a 54 y.o. male, presents for an audio only virtual appointment due to not being able to do an audio and video virtual appointment   Virtual or Telephone Consent    Patient presents for an audio only virtual appointment .an interactive audio telecommunication system which permits real time communications between the patient (at the originating site) and provider (at the distant site) was utilized to provide this telehealth service.   Verbal consent was requested and obtained from Brandt Hdz on this date, 8/6/25 for a telehealth visit and the patient's location was confirmed at the time of the visit.     Diagnoses :   major depression recurrent moderate F33.1 secondary diagnoses   Generalized anxiety disorder F 41.1 moderate primary  diagnoses   Bipolar II disorder moderate F 31.81 third diagnoses     Problem List         Patient Active Problem List   Diagnosis    Atrioventricular block, complete (Multi)    Biceps tendonitis    Cardiac pacemaker    Cardiomyopathy (Multi)    CHD (congenital heart disease) ([facility])    Central pain syndrome    Cognitive deficits as late effect of cerebrovascular disease    Chronic fatigue    Complaint of memory disorder without observed objective memory deficit    Congenital heart block (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)    Polyp of colon    Rotator cuff syndrome of right shoulder    Sarcoidosis     Paroxysmal atrial fibrillation (Multi)    Sinus node dysfunction (Multi)    RAD (reactive airway disease) (HHS-HCC)    Sleep apnea    Sleep disturbance    Stroke (Multi)    Type 2 diabetes mellitus with hyperglycemia, without long-term current use of insulin (Multi)    Verruca plana    Xerosis of skin    Acute hepatitis C virus infection without hepatic coma    Attention deficit hyperactivity disorder, inattentive type    Congenital nystagmus    Decreased strength    Major depressive disorder, recurrent severe without psychotic features (Multi)    Insomnia    Neuropathic pain    Prediabetes    Pseudophakia    PVT (portal vein thrombosis)    Refractive error    Regular astigmatism of both eyes    Diabetes mellitus without complication (Multi)    Unsteady gait    Visual impairment    Word finding difficulty    Chronic painful diabetic neuropathy (Multi)    Acute bronchitis due to other specified organisms    Chronic cough    High risk medication use    BMI 29.0-29.9,adult    Encounter for medication review and counseling    Encounter to discuss treatment options    Partial seizure disorder (Multi)    Focal epilepsy (Multi)    Left ventricular systolic dysfunction (LVSD)    Obesity    Keratosis    Obesity with body mass index 30 or greater    Primary focal hyperhidrosis of soles    Antalgic gait    Gastroesophageal reflux disease    Smokes tobacco daily    Impaired cognition    Headache    Fatigue    Hyperglycemia    Disorder of rotator cuff    Amnesia    Cognitive deficit due to and not concurrent with cerebrovascular disease    Acute prostatitis    Attention deficit hyperactivity disorder (ADHD), predominantly inattentive type    Decreased testosterone level    Atrial fibrillation (Multi)    Hyperplastic polyp of large intestine    Hypertension    Portal vein thrombosis    Reactive airway disease (HHS-HCC)    Disturbance in sleep behavior    Disorder of refraction    Reaction to chronic stress    Depressive disorder     Type 2 diabetes mellitus (Multi)    Asteatosis cutis    Abnormal gait    Bipolar II disorder (Multi)    Mild vascular dementia without behavioral disturbance, psychotic disturbance, mood disturbance, or anxiety (Multi)    Heart failure with recovered ejection fraction (HFrecEF) (Multi)         Treatment Plan/Recommendations:   Follow-up plan for depression was discussed with patient.  Follow up in 4-6 weeks   Can call  for treatment and scheduling concerns   Can continue self care and wellness efforts and maintain routine health screenings  Psychotropic medications :  Continue bupropion xl 300 mg , daily each morning for depression   Continue buspar 10 mg 2 tablets each morning and 2 tablets each afternoon and 2 tablets each night at bedtime for anxiety and adjunct treatment for depression   Continue hydoxyzine for anxiety 25 mg , two times a day as needed    Continue vraylar 6 mg at bedtime for depression     Continue  (already on per another medial provider ) lamotrigine for central nerve pain per neurology , this can also help with bipolar II disorder , 200 mg twice a day       Review with patient: Treatment plan reviewed with the patient.  Medication risks/benefit reviewed with the patient     HPI:  He says his mood is still depressed at times with crying , says he does not think of anything in particular when that happens  He is still having 2-3 panic attacks a week   Slight improvement in anxiety , with buspirone increase at previous appointment   He smokes about 18 cigarrettes a day , almost a pack a day , not willing to cut back at this time   He says he does not have a car    He used to enjoy reading , mainly comedies   He feels like he has some support in his life , he has 2 older brothers   He is in aa and has friends in aa  He goes to meetings for aa every night , he gets rides to the meetings   He says he gets nervous at meetings , still pushes himself to go to them   He has been sober  for 8 years   No substance abuse issues , no use of thc or marijuana or cbd products    He says his short term memory and long term memory are poor , he says this does not pose any issues with day to day activities   He usually remembers to take his medications , he does not use pill boxes says he can forget to take them once a week , and he has not been taking propranolol afternoon dose ( takes for blood pressure ) , still sometimes forgets to take buspar afternoon dose , suggested he set an alarm for this   Prior to seeing [facility] psychiatry during his care here , he was seeing a psychiatrist at [facility] in [place] , he was seen there for a year or two he does not remember much about that place he says    He sees a psychotherapist every other week at [facility] , bk vyas , this is helpful  He has been on bupropion for a year or 2 ago when he was really depressed and anxious , he says he gets very nervous and shaky inside   He has been on vraylar since nov 2023 and this has helped some   He feels the need to leave a setting suddenly when he gets suddenly anxious   He has been on lamotrigine for central nerve pain per neurology   He is diagnosed with bipolar 2 disorder , he had a manic episode a long time ago , he does not remember when   Has a history of psych hospitalization in 2012 for suicidal ideation   Previous psych meds :  Depakote : does not remember the response   Remeron : too sedating   Zoloft has been taken , he says he did well on this   Prozac : does not remember the response   He sleeps about 8-9 hours a night , he naps twice a day for an hour each time   Support provided for stressors    No signs of cognitive deficits      Psych ros and medical ros as noted above       Social History     Socioeconomic History    Marital status:      Spouse name: Not on file    Number of children: Not on file    Years of education: Not on file    Highest education  level: Not on file   Occupational History    Not on file   Tobacco Use    Smoking status: Every Day     Current packs/day: 1.00     Average packs/day: 1 pack/day for 40.0 years (40.0 ttl pk-yrs)     Types: Cigarettes     Passive exposure: Never    Smokeless tobacco: Current     Types: Chew   Vaping Use    Vaping status: Every Day   Substance and Sexual Activity    Alcohol use: Yes    Drug use: Not Currently     Types: Cocaine, Heroin    Sexual activity: Not Currently     Partners: Female   Other Topics Concern    Not on file   Social History Narrative    Not on file     Social Drivers of Health     Financial Resource Strain: Not on file   Food Insecurity: No Food Insecurity (11/15/2023)    Received from [facility]    [facility] Brief Food     Brief social Worry about food: Not on file     Brief social Food run out: Not on file   Transportation Needs: No Transportation Needs (11/15/2023)    Received from [facility]    [facility] Brief Transportation     Brief social Transport to appt: Not on file     Brief social Transport to work: Not on file   Physical Activity: Not on file   Stress: Not on file   Social Connections: Not on file   Intimate Partner Violence: Not on file   Housing Stability: Low Risk  (11/15/2023)    Received from [facility]    [facility] Brief Housing     Brief social Pay for mortgage/rent: Not on file     Brief social: Place to sleep: Not on file     Vitals:  There were no vitals filed for this visit.    Charmaine Irving, HANG-CNS

## 2025-08-10 PROBLEM — F41.1 GAD (GENERALIZED ANXIETY DISORDER): Status: ACTIVE | Noted: 2025-08-10

## 2025-08-17 DIAGNOSIS — I10 PRIMARY HYPERTENSION: ICD-10-CM

## 2025-08-17 DIAGNOSIS — R35.0 BENIGN PROSTATIC HYPERPLASIA WITH URINARY FREQUENCY: ICD-10-CM

## 2025-08-17 DIAGNOSIS — N40.1 BENIGN PROSTATIC HYPERPLASIA WITH URINARY FREQUENCY: ICD-10-CM

## 2025-08-18 RX ORDER — TAMSULOSIN HYDROCHLORIDE 0.4 MG/1
0.4 CAPSULE ORAL DAILY
Qty: 30 CAPSULE | Refills: 0 | Status: SHIPPED | OUTPATIENT
Start: 2025-08-18

## 2025-08-18 RX ORDER — TELMISARTAN 20 MG/1
20 TABLET ORAL DAILY
Qty: 30 TABLET | Refills: 0 | Status: SHIPPED | OUTPATIENT
Start: 2025-08-18

## 2025-08-20 DIAGNOSIS — G40.109 FOCAL EPILEPSY (MULTI): ICD-10-CM

## 2025-08-20 DIAGNOSIS — I48.0 PAROXYSMAL ATRIAL FIBRILLATION (MULTI): ICD-10-CM

## 2025-08-21 RX ORDER — LAMOTRIGINE 200 MG/1
TABLET ORAL 2 TIMES DAILY
Qty: 60 TABLET | Refills: 2 | Status: SHIPPED | OUTPATIENT
Start: 2025-08-21

## 2025-08-21 RX ORDER — APIXABAN 5 MG/1
5 TABLET, FILM COATED ORAL 2 TIMES DAILY
Qty: 180 TABLET | Refills: 2 | Status: SHIPPED | OUTPATIENT
Start: 2025-08-21

## 2025-08-28 ENCOUNTER — APPOINTMENT (OUTPATIENT)
Dept: CARDIOLOGY | Facility: CLINIC | Age: 55
End: 2025-08-28
Payer: COMMERCIAL

## 2025-09-03 ENCOUNTER — APPOINTMENT (OUTPATIENT)
Dept: BEHAVIORAL HEALTH | Facility: CLINIC | Age: 55
End: 2025-09-03
Payer: COMMERCIAL

## 2025-09-03 PROBLEM — F33.1 DEPRESSION, MAJOR, RECURRENT, MODERATE: Status: ACTIVE | Noted: 2025-09-03

## 2025-09-05 ENCOUNTER — APPOINTMENT (OUTPATIENT)
Dept: CARDIOLOGY | Facility: CLINIC | Age: 55
End: 2025-09-05
Payer: COMMERCIAL

## 2025-09-05 ENCOUNTER — HOSPITAL ENCOUNTER (OUTPATIENT)
Dept: CARDIOLOGY | Facility: HOSPITAL | Age: 55
Discharge: HOME | End: 2025-09-05
Payer: COMMERCIAL

## 2025-09-05 DIAGNOSIS — Z95.0 CARDIAC PACEMAKER: ICD-10-CM

## 2025-09-05 PROBLEM — I42.8 NONISCHEMIC CARDIOMYOPATHY (MULTI): Status: ACTIVE | Noted: 2023-02-08

## 2025-09-05 PROBLEM — Z79.01 CHRONIC ANTICOAGULATION: Status: ACTIVE | Noted: 2025-09-05

## 2025-09-05 PROBLEM — Z87.74: Status: ACTIVE | Noted: 2025-09-05

## 2025-09-05 PROCEDURE — 93281 PM DEVICE PROGR EVAL MULTI: CPT

## 2025-10-06 ENCOUNTER — APPOINTMENT (OUTPATIENT)
Dept: NEUROLOGY | Facility: CLINIC | Age: 55
End: 2025-10-06
Payer: COMMERCIAL

## 2025-10-09 ENCOUNTER — APPOINTMENT (OUTPATIENT)
Dept: BEHAVIORAL HEALTH | Facility: CLINIC | Age: 55
End: 2025-10-09
Payer: COMMERCIAL

## 2026-03-06 ENCOUNTER — APPOINTMENT (OUTPATIENT)
Dept: CARDIOLOGY | Facility: CLINIC | Age: 56
End: 2026-03-06
Payer: COMMERCIAL

## 2026-04-20 ENCOUNTER — APPOINTMENT (OUTPATIENT)
Dept: PRIMARY CARE | Facility: CLINIC | Age: 56
End: 2026-04-20
Payer: COMMERCIAL

## (undated) DEVICE — ACCESSORY KIT, LEAD MDL 6056M

## (undated) DEVICE — KIT, WRENCH, STERILE, F/LEADS

## (undated) DEVICE — BLADE, CAUTERY, EXTENDED, PEAK PLASMA

## (undated) DEVICE — HEMOSTAT, ARISTA, ABSORBABLE, 1 GRAMS

## (undated) DEVICE — HEMOSTAT, D-STAT FLOWABLE

## (undated) DEVICE — WOUND SYSTEM, DEBRIDEMENT & CLEANING, O.R DUOPAK

## (undated) DEVICE — DRESSING, AQUACEL AG, HYDROFIBER W/SILVER, 3.5 X 6 IN